# Patient Record
Sex: MALE | Race: BLACK OR AFRICAN AMERICAN | Employment: OTHER | URBAN - METROPOLITAN AREA
[De-identification: names, ages, dates, MRNs, and addresses within clinical notes are randomized per-mention and may not be internally consistent; named-entity substitution may affect disease eponyms.]

---

## 2017-05-30 ENCOUNTER — ANESTHESIA EVENT (OUTPATIENT)
Dept: ENDOSCOPY | Age: 62
End: 2017-05-30
Payer: OTHER GOVERNMENT

## 2017-05-30 RX ORDER — PIMECROLIMUS 10 MG/G
CREAM TOPICAL
COMMUNITY
End: 2018-08-01 | Stop reason: ALTCHOICE

## 2017-05-30 RX ORDER — FENOFIBRATE 145 MG/1
145 TABLET, COATED ORAL DAILY
COMMUNITY
End: 2018-08-01 | Stop reason: ALTCHOICE

## 2017-05-30 NOTE — ANESTHESIA PREPROCEDURE EVALUATION
Anesthetic History   No history of anesthetic complications            Review of Systems / Medical History  Patient summary reviewed, nursing notes reviewed and pertinent labs reviewed    Pulmonary        Sleep apnea: CPAP           Neuro/Psych   Within defined limits           Cardiovascular    Hypertension          Hyperlipidemia    Exercise tolerance: >4 METS  Comments:  EKG: SB   GI/Hepatic/Renal               Comments: Colon polyps Endo/Other    Diabetes: type 2    Obesity     Other Findings   Comments: Gout  Prostatitis         Physical Exam    Airway  Mallampati: II  TM Distance: > 6 cm  Neck ROM: normal range of motion   Mouth opening: Normal     Cardiovascular    Rhythm: regular  Rate: normal         Dental  No notable dental hx       Pulmonary  Breath sounds clear to auscultation               Abdominal  GI exam deferred       Other Findings            Anesthetic Plan    ASA: 3  Anesthesia type: total IV anesthesia          Induction: Intravenous  Anesthetic plan and risks discussed with: Patient      Did NOT take BP meds this am, not due until 9 am

## 2017-05-31 ENCOUNTER — HOSPITAL ENCOUNTER (OUTPATIENT)
Age: 62
Setting detail: OUTPATIENT SURGERY
Discharge: HOME | End: 2017-05-31
Attending: SPECIALIST | Admitting: SPECIALIST
Payer: OTHER GOVERNMENT

## 2017-05-31 ENCOUNTER — ANESTHESIA (OUTPATIENT)
Dept: ENDOSCOPY | Age: 62
End: 2017-05-31
Payer: OTHER GOVERNMENT

## 2017-05-31 VITALS
TEMPERATURE: 97.8 F | RESPIRATION RATE: 13 BRPM | HEIGHT: 73 IN | DIASTOLIC BLOOD PRESSURE: 87 MMHG | BODY MASS INDEX: 36.72 KG/M2 | SYSTOLIC BLOOD PRESSURE: 148 MMHG | OXYGEN SATURATION: 99 % | WEIGHT: 277.06 LBS | HEART RATE: 68 BPM

## 2017-05-31 PROCEDURE — 76060000031 HC ANESTHESIA FIRST 0.5 HR: Performed by: SPECIALIST

## 2017-05-31 PROCEDURE — 74011250636 HC RX REV CODE- 250/636: Performed by: SPECIALIST

## 2017-05-31 PROCEDURE — 74011000250 HC RX REV CODE- 250

## 2017-05-31 PROCEDURE — 88305 TISSUE EXAM BY PATHOLOGIST: CPT | Performed by: SPECIALIST

## 2017-05-31 PROCEDURE — 76040000019: Performed by: SPECIALIST

## 2017-05-31 PROCEDURE — 77030013992 HC SNR POLYP ENDOSC BSC -B: Performed by: SPECIALIST

## 2017-05-31 PROCEDURE — 74011250636 HC RX REV CODE- 250/636

## 2017-05-31 RX ORDER — MIDAZOLAM HYDROCHLORIDE 1 MG/ML
1-2 INJECTION, SOLUTION INTRAMUSCULAR; INTRAVENOUS
Status: DISCONTINUED | OUTPATIENT
Start: 2017-05-31 | End: 2017-05-31 | Stop reason: HOSPADM

## 2017-05-31 RX ORDER — SODIUM CHLORIDE 0.9 % (FLUSH) 0.9 %
5-10 SYRINGE (ML) INJECTION AS NEEDED
Status: DISCONTINUED | OUTPATIENT
Start: 2017-05-31 | End: 2017-05-31 | Stop reason: HOSPADM

## 2017-05-31 RX ORDER — LIDOCAINE HYDROCHLORIDE 20 MG/ML
INJECTION, SOLUTION EPIDURAL; INFILTRATION; INTRACAUDAL; PERINEURAL AS NEEDED
Status: DISCONTINUED | OUTPATIENT
Start: 2017-05-31 | End: 2017-05-31 | Stop reason: HOSPADM

## 2017-05-31 RX ORDER — SODIUM CHLORIDE 9 MG/ML
50 INJECTION, SOLUTION INTRAVENOUS CONTINUOUS
Status: DISCONTINUED | OUTPATIENT
Start: 2017-05-31 | End: 2017-05-31 | Stop reason: HOSPADM

## 2017-05-31 RX ORDER — SODIUM CHLORIDE 0.9 % (FLUSH) 0.9 %
5-10 SYRINGE (ML) INJECTION EVERY 8 HOURS
Status: DISCONTINUED | OUTPATIENT
Start: 2017-05-31 | End: 2017-05-31 | Stop reason: HOSPADM

## 2017-05-31 RX ORDER — DEXTROMETHORPHAN/PSEUDOEPHED 2.5-7.5/.8
1.2 DROPS ORAL
Status: DISCONTINUED | OUTPATIENT
Start: 2017-05-31 | End: 2017-05-31 | Stop reason: HOSPADM

## 2017-05-31 RX ORDER — PROPOFOL 10 MG/ML
INJECTION, EMULSION INTRAVENOUS AS NEEDED
Status: DISCONTINUED | OUTPATIENT
Start: 2017-05-31 | End: 2017-05-31 | Stop reason: HOSPADM

## 2017-05-31 RX ADMIN — SODIUM CHLORIDE: 900 INJECTION, SOLUTION INTRAVENOUS at 07:05

## 2017-05-31 RX ADMIN — PROPOFOL 100 MG: 10 INJECTION, EMULSION INTRAVENOUS at 07:38

## 2017-05-31 RX ADMIN — SODIUM CHLORIDE 50 ML/HR: 900 INJECTION, SOLUTION INTRAVENOUS at 07:27

## 2017-05-31 RX ADMIN — PROPOFOL 100 MG: 10 INJECTION, EMULSION INTRAVENOUS at 07:51

## 2017-05-31 RX ADMIN — LIDOCAINE HYDROCHLORIDE 80 MG: 20 INJECTION, SOLUTION EPIDURAL; INFILTRATION; INTRACAUDAL; PERINEURAL at 07:38

## 2017-05-31 RX ADMIN — PROPOFOL 100 MG: 10 INJECTION, EMULSION INTRAVENOUS at 07:56

## 2017-05-31 RX ADMIN — PROPOFOL 100 MG: 10 INJECTION, EMULSION INTRAVENOUS at 07:45

## 2017-05-31 RX ADMIN — PROPOFOL 100 MG: 10 INJECTION, EMULSION INTRAVENOUS at 07:35

## 2017-05-31 NOTE — DISCHARGE INSTRUCTIONS
Russ Burkitt .  [id number]  1955    COLON DISCHARGE INSTRUCTIONS  Discomfort:  Redness at IV site- apply warm compress to area; if redness or soreness persist- contact your physician  There may be a slight amount of blood passed from the rectum  Gaseous discomfort- walking, belching will help relieve any discomfort  You may not operate a vehicle for 12 hours  You may not engage in an occupation involving machinery or appliances for rest of today  You may not drink alcoholic beverages for at least 12 hours  Avoid making any critical decisions for at least 24 hour  DIET:   Regular diet. - however -  remember your colon is empty and a heavy meal will produce gas. Avoid these foods:  vegetables, fried / greasy foods, carbonated drinks for today. MEDICATIONS:        Regarding Aspirin or Nonsteroidal medications, please see below. ACTIVITY:  You may resume your normal daily activities it is recommended that you spend the remainder of the day resting -  avoid any strenuous activity. CALL M.D. ANY SIGN OF:  Increasing pain, nausea, vomiting  Abdominal distension (swelling)  New increased bleeding (oral or rectal)  Fever (chills)  Tylenol as needed for pain. Follow-up Instructions:   Call Dr. Troy Joel for results of procedure / biopsy in 4-5 days at telephone #  [phone]              Repeat Colonoscopy in 3 years. [employer] Activation    Thank you for requesting access to [employer]. Please follow the instructions below to securely access and download your online medical record. [employer] allows you to send messages to your doctor, view your test results, renew your prescriptions, schedule appointments, and more. How Do I Sign Up? 1. In your internet browser, go to [email or web address]  2. Click on the First Time User? Click Here link in the Sign In box. You will be redirect to the New Member Sign Up page. 3. Enter your [employer] Access Code exactly as it appears below.  You will not need to use this code after youve completed the sign-up process. If you do not sign up before the expiration date, you must request a new code. [employer] Access Code: Activation code not generated  Current Enish Status: Active (This is the date your [employer]ish access code will )    4. Enter the last four digits of your Social Security Number (xxxx) and Date of Birth (mm/dd/yyyy) as indicated and click Submit. You will be taken to the next sign-up page. 5. Create a [employer] ID. This will be your [employer]ish login ID and cannot be changed, so think of one that is secure and easy to remember. 6. Create a Enish password. You can change your password at any time. 7. Enter your Password Reset Question and Answer. This can be used at a later time if you forget your password. 8. Enter your e-mail address. You will receive e-mail notification when new information is available in [address] 9. Click Sign Up. You can now view and download portions of your medical record. 10. Click the Download Summary menu link to download a portable copy of your medical information. Additional Information    If you have questions, please visit the Frequently Asked Questions section of the [employer] website at [email or web address] Remember, [employer] is NOT to be used for urgent needs. For medical emergencies, dial 911.

## 2017-05-31 NOTE — PROCEDURES
Colonoscopy Procedure Note    Indications:   Personal history of colon polyps (screening only)    Referring Physician: iMnoo Upton MD  Anesthesia/Sedation: MAC anesthesia Propofol  Endoscopist:  Dr. Anastacia Willis    Procedure in Detail:  Informed consent was obtained for the procedure, including sedation. Risks of perforation, hemorrhage, adverse drug reaction, and aspiration were discussed. The patient was placed in the left lateral decubitus position. Based on the pre-procedure assessment, including review of the patient's medical history, medications, allergies, and review of systems, he had been deemed to be an appropriate candidate for moderate sedation; he was therefore sedated with the medications listed above. The patient was monitored continuously with ECG tracing, pulse oximetry, blood pressure monitoring, and direct observations. A rectal examination was performed. The [id number] was inserted into the rectum and advanced under direct vision to the cecum, which was identified by the ileocecal valve and appendiceal orifice. The quality of the colonic preparation was adequate. A careful inspection was made as the colonoscope was withdrawn, including a retroflexed view of the rectum; findings and interventions are described below. Appropriate photodocumentation was obtained. Findings:     1. Scope advanced to the cecum. 2.  There were (3) sessile polyps in the transverse colon that were 5 mm in size s/p cold snare removal.  3.  Diffuse moderate severity diverticulosis in the sigmoid, descending and transverse colon. 4.  Mucosa normal throughout. Therapies:  As above    Specimen: Specimens were collected as described above and sent to pathology. Complications: None were encountered during the procedure. EBL: < 10 ml. Recommendations:     - Await pathology. - Repeat colonoscopy in 3 years.       Signed By: Yareli Garvey MD                        May 31, 2017

## 2017-05-31 NOTE — H&P
Gastroenterology Outpatient History and Physical    Patient: Mohamud Zuñiga .    Physician: Nicolas Beaulieu MD    Vital Signs: Blood pressure (!) 148/92, pulse 73, temperature 97.8 °F (36.6 °C), resp. rate 18, height 6' 1\" (1.854 m), weight 125.7 kg (277 lb 1 oz), SpO2 100 %. Allergies: No Known Allergies    Chief Complaint: H/O Polyps    History of Present Illness: 58 yo BM for h/o colon polyps. Justification for Procedure: above    History:  Past Medical History:   Diagnosis Date    Hypercholesterolemia     Hypertension     Ill-defined condition     colon polyp    Prostatism       Past Surgical History:   Procedure Laterality Date    HX COLONOSCOPY      HX OTHER SURGICAL      ingrown toenail repair      Social History     Social History    Marital status:      Spouse name: N/A    Number of children: N/A    Years of education: N/A     Social History Main Topics    Smoking status: Never Smoker    Smokeless tobacco: Never Used    Alcohol use No    Drug use: No    Sexual activity: Yes     Partners: Female     Other Topics Concern    None     Social History Narrative      Family History   Problem Relation Age of Onset    Cancer Mother     Hypertension Mother     Cancer Father        Medications:   Prior to Admission medications    Medication Sig Start Date End Date Taking? Authorizing Provider   fenofibrate nanocrystallized (TRICOR) 145 mg tablet Take 145 mg by mouth daily. Yes Historical Provider   inulin (FIBER CHOICE, INULIN,) 1.5 gram chew Take 2 Tabs by mouth daily. Yes Historical Provider   pimecrolimus (ELIDEL) 1 % topical cream Apply  to affected area two (2) times daily as needed.    Yes Historical Provider   atenolol (TENORMIN) 50 mg tablet TAKE 1 TABLET DAILY 3/28/17  Yes Lenora Vogt MD   amLODIPine (NORVASC) 5 mg tablet TAKE 1 TABLET DAILY 3/28/17  Yes Lenora Vogt MD   tamsulosin (FLOMAX) 0.4 mg capsule TAKE 1 CAPSULE DAILY 7/19/16  Yes Lenora Vogt MD cholecalciferol, vitamin D3, (VITAMIN D3) 2,000 unit tab Take  by mouth. Yes Harvinder Blanchard MD   ascorbic acid (VITAMIN C) 500 mg tablet Take 1,000 mg by mouth daily. Yes Harvinder Blanchard MD   fluocinoLONE (SYNALAR) 0.025 % topical cream Apply  to affected area two (2) times a day. 4/26/15  Yes Kwame Hernandez MD   finasteride (PROSCAR) 5 mg tablet TAKE 1 TABLET BY MOUTH EVERY DAY 2/27/15  Yes Kwame Hernandez MD       Physical Exam:   General: alert, no distress   HEENT: Head: Normocephalic, no lesions, without obvious abnormality.    Heart: regular rate and rhythm, S1, S2 normal, no murmur, click, rub or gallop   Lungs: chest clear, no wheezing, rales, normal symmetric air entry   Abdominal: soft, NT/ND + BS   Neurological: Grossly normal   Extremities: extremities normal, atraumatic, no cyanosis or edema     Findings/Diagnosis: h/o polyps    Plan of Care/Planned Procedure: Colonoscopy with MAC    Signed By: Shae Phan MD     May 31, 2017

## 2017-05-31 NOTE — ANESTHESIA POSTPROCEDURE EVALUATION
Post-Anesthesia Evaluation and Assessment    Patient: Nichole Grover Sr. MRN: [id number]  SSN: [id number]    YOB: 1955  Age: 59 y.o. Sex: male       Cardiovascular Function/Vital Signs  Visit Vitals    /87    Pulse 68    Temp 36.6 °C (97.8 °F)    Resp 13    Ht 6' 1\" (1.854 m)    Wt 125.7 kg (277 lb 1 oz)    SpO2 99%    BMI 36.55 kg/m2       Patient is status post total IV anesthesia, MAC anesthesia for Procedure(s):  COLONOSCOPY  ENDOSCOPIC POLYPECTOMY. Nausea/Vomiting: None    Postoperative hydration reviewed and adequate. Pain:  Pain Scale 1: Numeric (0 - 10) (05/31/17 0834)  Pain Intensity 1: 0 (05/31/17 0089)   Managed    Neurological Status: At baseline    Mental Status and Level of Consciousness: Arousable    Pulmonary Status:   O2 Device: Room air (05/31/17 1334)   Adequate oxygenation and airway patent    Complications related to anesthesia: None    Post-anesthesia assessment completed.  No concerns    Signed By: Mark Sebastian DO     May 31, 2017

## 2017-05-31 NOTE — IP AVS SNAPSHOT
[address] [facility]. 
[phone] Patient: Mariel Kulkarni Sr. MRN: [id number] BRIAN:[birth year] You are allergic to the following No active allergies Recent Documentation Height Weight BMI Smoking Status 1.854 m 125.7 kg 36.55 kg/m2 Never Smoker Emergency Contacts Name Discharge Info Relation Home Work Mobile Marilou Enrique  Spouse [3] [phone] About your hospitalization You were admitted on:  May 31, 2017 You last received care in the:  [facility] ENDOSCOPY You were discharged on:  May 31, 2017 Unit phone number:  [phone] Why you were hospitalized Your primary diagnosis was:  Not on File Providers Seen During Your Hospitalizations Provider Role Specialty Primary office phone Akash Rockwell MD Attending Provider Gastroenterology [phone] Your Primary Care Physician (PCP) Primary Care Physician Office Phone Office Fax Chana Francis [phone] Follow-up Information None Current Discharge Medication List  
  
CONTINUE these medications which have NOT CHANGED Dose & Instructions Dispensing Information Comments Morning Noon Evening Bedtime  
 amLODIPine 5 mg tablet Commonly known as:  Reggie Frida Your last dose was: Your next dose is: TAKE 1 TABLET DAILY Quantity:  90 Tab Refills:  3  
     
   
   
   
  
 atenolol 50 mg tablet Commonly known as:  TENORMIN Your last dose was: Your next dose is: TAKE 1 TABLET DAILY Quantity:  90 Tab Refills:  3 ELIDEL 1 % topical cream  
Generic drug:  pimecrolimus Your last dose was: Your next dose is:    
   
   
 Apply  to affected area two (2) times daily as needed. Refills:  0 fenofibrate nanocrystallized 145 mg tablet Commonly known as:  [employer] Your last dose was: Your next dose is:    
   
   
 Dose:  145 mg Take 145 mg by mouth daily. Refills:  0 FIBER CHOICE (INULIN) 1.5 gram Carlton Ellie Generic drug:  inulin Your last dose was: Your next dose is:    
   
   
 Dose:  2 Tab Take 2 Tabs by mouth daily. Refills:  0  
     
   
   
   
  
 finasteride 5 mg tablet Commonly known as:  PROSCAR Your last dose was: Your next dose is: TAKE 1 TABLET BY MOUTH EVERY DAY Quantity:  90 Tab Refills:  4  
     
   
   
   
  
 fluocinoLONE 0.025 % topical cream  
Commonly known as:  SYNALAR Your last dose was: Your next dose is:    
   
   
 Apply  to affected area two (2) times a day. Quantity:  180 g Refills:  3  
     
   
   
   
  
 tamsulosin 0.4 mg capsule Commonly known as:  FLOMAX Your last dose was: Your next dose is: TAKE 1 CAPSULE DAILY Quantity:  90 Cap Refills:  3 VITAMIN C 500 mg tablet Generic drug:  ascorbic acid (vitamin C) Your last dose was: Your next dose is:    
   
   
 Dose:  1000 mg Take 1,000 mg by mouth daily. Refills:  0  
     
   
   
   
  
 VITAMIN D3 2,000 unit Tab Generic drug:  cholecalciferol (vitamin D3) Your last dose was: Your next dose is: Take  by mouth. Refills:  0 Discharge Instructions Sydnee Sneed Sr. 
[id number] 
1955 COLON DISCHARGE INSTRUCTIONS Discomfort: 
Redness at IV site- apply warm compress to area; if redness or soreness persist- contact your physician There may be a slight amount of blood passed from the rectum Gaseous discomfort- walking, belching will help relieve any discomfort You may not operate a vehicle for 12 hours You may not engage in an occupation involving machinery or appliances for rest of today You may not drink alcoholic beverages for at least 12 hours Avoid making any critical decisions for at least 24 hour DIET: 
 Regular diet.  however -  remember your colon is empty and a heavy meal will produce gas. Avoid these foods:  vegetables, fried / greasy foods, carbonated drinks for today. MEDICATIONS: 
  
 
 Regarding Aspirin or Nonsteroidal medications, please see below. ACTIVITY: 
You may resume your normal daily activities it is recommended that you spend the remainder of the day resting -  avoid any strenuous activity. CALL M.D. ANY SIGN OF: Increasing pain, nausea, vomiting Abdominal distension (swelling) New increased bleeding (oral or rectal) Fever (chills) Tylenol as needed for pain. Follow-up Instructions: 
 Call Dr. Sona Abbasi for results of procedure / biopsy in 4-5 days at telephone #  [phone] Repeat Colonoscopy in 3 years. [employer] Activation Thank you for requesting access to [employer]. Please follow the instructions below to securely access and download your online medical record. [employer] allows you to send messages to your doctor, view your test results, renew your prescriptions, schedule appointments, and more. How Do I Sign Up? 1. In your internet browser, go to [email or web address] 
2. Click on the First Time User? Click Here link in the Sign In box. You will be redirect to the New Member Sign Up page. 3. Enter your [employer] Access Code exactly as it appears below. You will not need to use this code after youve completed the sign-up process. If you do not sign up before the expiration date, you must request a new code. [employer] Access Code: Activation code not generated Current [employer] Status: Active (This is the date your [employer] access code will ) 4.  Enter the last four digits of your Social Security Number (xxxx) and Date of Birth (mm/dd/yyyy) as indicated and click Submit. You will be taken to the next sign-up page. 5. Create a [employer] ID. This will be your [employer] login ID and cannot be changed, so think of one that is secure and easy to remember. 6. Create a [employer] password. You can change your password at any time. 7. Enter your Password Reset Question and Answer. This can be used at a later time if you forget your password. 8. Enter your e-mail address. You will receive e-mail notification when new information is available in [address] 9. Click Sign Up. You can now view and download portions of your medical record. 10. Click the Download Summary menu link to download a portable copy of your medical information. Additional Information If you have questions, please visit the Frequently Asked Questions section of the [employer] website at [email or web address] Remember, [employer] is NOT to be used for urgent needs. For medical emergencies, dial 911. Discharge Orders None [facility]! Dear Albin Mckeon: Thank you for requesting a [employer] account. Our records indicate that you already have an active [employer] account. You can access your account anytime at [email or web address] Did you know that you can access your hospital and ER discharge instructions at any time in [employer]? You can also review all of your test results from your hospital stay or ER visit. Additional Information If you have questions, please visit the Frequently Asked Questions section of the [employer] website at [email or web address] Remember, [employer] is NOT to be used for urgent needs. For medical emergencies, dial 911. Now available from your iPhone and Android! General Information Please provide this summary of care documentation to your next provider.  
  
  
    
    
 Patient Signature: ____________________________________________________________ Date:  ____________________________________________________________  
  
Mimi Iba Provider Signature:  ____________________________________________________________ Date:  ____________________________________________________________

## 2017-05-31 NOTE — PERIOP NOTES
Anesthesia reports 500mg Propofol, 80mg Lidocaine and 350mL NS given during procedure. Received report from anesthesia staff on vital signs and status of patient.

## 2017-07-14 RX ORDER — TAMSULOSIN HYDROCHLORIDE 0.4 MG/1
CAPSULE ORAL
Qty: 90 CAP | Refills: 2 | Status: SHIPPED | OUTPATIENT
Start: 2017-07-14 | End: 2018-08-01 | Stop reason: SDUPTHER

## 2018-03-11 ENCOUNTER — HOSPITAL ENCOUNTER (EMERGENCY)
Age: 63
Discharge: HOME | End: 2018-03-11
Attending: EMERGENCY MEDICINE
Payer: OTHER GOVERNMENT

## 2018-03-11 ENCOUNTER — APPOINTMENT (OUTPATIENT)
Dept: GENERAL RADIOLOGY | Age: 63
End: 2018-03-11
Payer: OTHER GOVERNMENT

## 2018-03-11 VITALS
TEMPERATURE: 98.1 F | HEART RATE: 59 BPM | RESPIRATION RATE: 16 BRPM | BODY MASS INDEX: 36.82 KG/M2 | WEIGHT: 277.78 LBS | HEIGHT: 73 IN | OXYGEN SATURATION: 99 % | DIASTOLIC BLOOD PRESSURE: 91 MMHG | SYSTOLIC BLOOD PRESSURE: 165 MMHG

## 2018-03-11 DIAGNOSIS — S39.012A STRAIN OF LUMBAR REGION, INITIAL ENCOUNTER: Primary | ICD-10-CM

## 2018-03-11 DIAGNOSIS — M54.50 ACUTE BILATERAL LOW BACK PAIN WITHOUT SCIATICA: ICD-10-CM

## 2018-03-11 PROCEDURE — 72100 X-RAY EXAM L-S SPINE 2/3 VWS: CPT

## 2018-03-11 PROCEDURE — 99282 EMERGENCY DEPT VISIT SF MDM: CPT

## 2018-03-11 RX ORDER — NAPROXEN 375 MG/1
375 TABLET ORAL 2 TIMES DAILY WITH MEALS
Qty: 20 TAB | Refills: 0 | Status: SHIPPED | OUTPATIENT
Start: 2018-03-11 | End: 2018-08-01 | Stop reason: ALTCHOICE

## 2018-03-11 RX ORDER — METHOCARBAMOL 500 MG/1
500 TABLET, FILM COATED ORAL 4 TIMES DAILY
Qty: 12 TAB | Refills: 0 | Status: SHIPPED | OUTPATIENT
Start: 2018-03-11 | End: 2018-08-01 | Stop reason: ALTCHOICE

## 2018-03-11 RX ORDER — COLCHICINE 0.6 MG/1
0.6 TABLET ORAL DAILY
COMMUNITY
End: 2018-08-01 | Stop reason: SDUPTHER

## 2018-03-11 NOTE — ED PROVIDER NOTES
EMERGENCY DEPARTMENT HISTORY AND PHYSICAL EXAM      Date: 3/11/2018  Patient Name: Julia Howell.    History of Presenting Illness     Chief Complaint   Patient presents with    Back Pain     pt reports to ed complaining of lower left back and hip pain onset one week ago- pt reports lifting a heavy pot of soup    Hip Pain       History Provided By: Patient    HPI: Julia Humphrey, 57 y.o. male with PMHx significant for hypercholesterolemia, HTN, prostatism, colon polyp, BPH and gout, presents ambulatory to the ED with cc of constant lower back pain which started 4 days ago after lifting a heavy pot of soup. Pt notes his pain was gradual in onset. He describes the pain as a soreness. Pt states his pain radiates into his right hip more than his left. He has had similar pain before. Pt states his pain is worse with bending, lying flat, and standing up. His pain improves with stretching and movement throughout the day. He has tried Tylenol with no relief. Pt denies fall or injury. He is otherwise without complaint. PCP: Tom Corrales MD    There are no other complaints, changes, or physical findings at this time. Current Outpatient Prescriptions   Medication Sig Dispense Refill    methocarbamol (ROBAXIN) 500 mg tablet Take 1 Tab by mouth four (4) times daily. 12 Tab 0    naproxen (NAPROSYN) 375 mg tablet Take 1 Tab by mouth two (2) times daily (with meals). 20 Tab 0    tamsulosin (FLOMAX) 0.4 mg capsule TAKE 1 CAPSULE DAILY 90 Cap 2    fenofibrate nanocrystallized (TRICOR) 145 mg tablet Take 145 mg by mouth daily.  inulin (FIBER CHOICE, INULIN,) 1.5 gram chew Take 2 Tabs by mouth daily.  atenolol (TENORMIN) 50 mg tablet TAKE 1 TABLET DAILY 90 Tab 3    amLODIPine (NORVASC) 5 mg tablet TAKE 1 TABLET DAILY 90 Tab 3    cholecalciferol, vitamin D3, (VITAMIN D3) 2,000 unit tab Take  by mouth.  ascorbic acid (VITAMIN C) 500 mg tablet Take 1,000 mg by mouth daily.       finasteride (PROSCAR) 5 mg tablet TAKE 1 TABLET BY MOUTH EVERY DAY 90 Tab 4    colchicine 0.6 mg tablet Take 0.6 mg by mouth daily.  pimecrolimus (ELIDEL) 1 % topical cream Apply  to affected area two (2) times daily as needed.  fluocinoLONE (SYNALAR) 0.025 % topical cream Apply  to affected area two (2) times a day. 180 g 3       Past History     Past Medical History:  Past Medical History:   Diagnosis Date    BPH (benign prostatic hyperplasia)     Gout     Hypercholesterolemia     Hypertension     Ill-defined condition     colon polyp    Prostatism        Past Surgical History:  Past Surgical History:   Procedure Laterality Date    COLONOSCOPY N/A 5/31/2017    COLONOSCOPY [facility]is [facility]ado [facility] at [facility]    HX COLONOSCOPY      HX OTHER SURGICAL      ingrown toenail repair       Family History:  Family History   Problem Relation Age of Onset    Cancer Mother     Hypertension Mother     Cancer Father        Social History:  Social History   Substance Use Topics    Smoking status: Never Smoker    Smokeless tobacco: Never Used    Alcohol use No       Allergies:  No Known Allergies      Review of Systems   Review of Systems   Constitutional: Negative. Negative for appetite change, chills, fatigue and fever. HENT: Negative. Negative for congestion and sore throat. Eyes: Negative. Negative for visual disturbance. Respiratory: Negative. Negative for cough and shortness of breath. Cardiovascular: Negative. Negative for chest pain, palpitations and leg swelling. Gastrointestinal: Negative. Negative for abdominal pain, constipation, diarrhea, nausea and vomiting. Genitourinary: Negative. Negative for difficulty urinating, dysuria, flank pain and hematuria. Musculoskeletal: Positive for back pain (lower radiating into right hip). Negative for neck pain. Skin: Negative. Negative for rash. Neurological: Negative.   Negative for dizziness, syncope, weakness, numbness and headaches. - loss of bowel and bladder control   Hematological: Negative. Psychiatric/Behavioral: Negative. All other systems reviewed and are negative. Physical Exam   Physical Exam   Constitutional: He is oriented to person, place, and time. He appears well-developed and well-nourished. No distress. HENT:   Head: Normocephalic and atraumatic. Neck: Normal range of motion. Cardiovascular: Normal rate, normal heart sounds, intact distal pulses and normal pulses. Exam reveals no gallop and no friction rub. No murmur heard. Pulmonary/Chest: Effort normal and breath sounds normal. No respiratory distress. He has no wheezes. He has no rales. Abdominal: Soft. Bowel sounds are normal. He exhibits no distension. There is no tenderness. Musculoskeletal: Normal range of motion. He exhibits no edema or tenderness. No reproducible tenderness to bilateral lower back and buttocks. No sciatic notch tenderness. No midline cervical, thoracic, or lumbar spinal tenderness to palpation. FROM spine and all joints/extremities in ER without difficulty. Ambulatory in ER without difficulty. Neurological: He is alert and oriented to person, place, and time. He has normal strength. No sensory deficit. No focal neurologic deficit. Strength 5/5 and equal bilateral upper and lower extremities. NVI all extremities, brisk cap refill all extremities. DP pulse 2+ bilaterally. Radial pulse 2+ bilaterally. Skin: Skin is warm and dry. No rash noted. He is not diaphoretic. No erythema. No pallor. Psychiatric: He has a normal mood and affect. His behavior is normal.   Nursing note and vitals reviewed. Diagnostic Study Results     Radiologic Studies -   XR SPINE LUMB 2 OR 3 V   Final Result   CLINICAL HISTORY: Low back pain     INDICATION: Low back pain  FINDINGS:     Three views of the lumbar spine are obtained. Spondylolisthesis at L4-5. Loss of disc height at L5-S1.   Visualized osseous structures are without evidence of fracture-dislocation. There is no significant soft tissue abnormality identified.     IMPRESSION  IMPRESSION:     No fracture. Medical Decision Making   I am the first provider for this patient. I reviewed the vital signs, available nursing notes, past medical history, past surgical history, family history and social history. Vital Signs-Reviewed the patient's vital signs. Patient Vitals for the past 12 hrs:   Temp Pulse Resp BP SpO2   03/11/18 1458 98.1 °F (36.7 °C) (!) 59 16 (!) 165/91 99 %     Records Reviewed: Nursing Notes and Old Medical Records    Provider Notes (Medical Decision Making):   DDx: sciatica, strain, spasm, radiculopathy     ED Course:   Initial assessment performed. The patients presenting problems have been discussed, and they are in agreement with the care plan formulated and outlined with them. I have encouraged them to ask questions as they arise throughout their visit. 3:43 PM  Patient would not like first dose of the prescribed medications in the ED. Disposition:  DISCHARGE NOTE  3:48 PM  The patient has been re-evaluated and is ready for discharge. Reviewed available results with patient. Counseled patient on diagnosis and care plan. Patient has expressed understanding, and all questions have been answered. Patient agrees with plan and agrees to follow up as recommended, or return to the ED if their symptoms worsen. Discharge instructions have been provided and explained to the patient, along with reasons to return to the ED. PLAN:  1. Current Discharge Medication List      START taking these medications    Details   methocarbamol (ROBAXIN) 500 mg tablet Take 1 Tab by mouth four (4) times daily. Qty: 12 Tab, Refills: 0      naproxen (NAPROSYN) 375 mg tablet Take 1 Tab by mouth two (2) times daily (with meals).   Qty: 20 Tab, Refills: 0         CONTINUE these medications which have NOT CHANGED    Details   tamsulosin (FLOMAX) 0.4 mg capsule TAKE 1 CAPSULE DAILY  Qty: 90 Cap, Refills: 2      fenofibrate nanocrystallized (TRICOR) 145 mg tablet Take 145 mg by mouth daily. inulin (FIBER CHOICE, INULIN,) 1.5 gram chew Take 2 Tabs by mouth daily. atenolol (TENORMIN) 50 mg tablet TAKE 1 TABLET DAILY  Qty: 90 Tab, Refills: 3      amLODIPine (NORVASC) 5 mg tablet TAKE 1 TABLET DAILY  Qty: 90 Tab, Refills: 3      cholecalciferol, vitamin D3, (VITAMIN D3) 2,000 unit tab Take  by mouth. ascorbic acid (VITAMIN C) 500 mg tablet Take 1,000 mg by mouth daily. finasteride (PROSCAR) 5 mg tablet TAKE 1 TABLET BY MOUTH EVERY DAY  Qty: 90 Tab, Refills: 4      colchicine 0.6 mg tablet Take 0.6 mg by mouth daily. pimecrolimus (ELIDEL) 1 % topical cream Apply  to affected area two (2) times daily as needed. fluocinoLONE (SYNALAR) 0.025 % topical cream Apply  to affected area two (2) times a day. Qty: 180 g, Refills: 3    Associated Diagnoses: Dermatitis           2. Follow-up Information     Follow up With Details Comments [address] [place] [address] Joel Terry MD Schedule an appointment as soon as possible for a visit in 3 days  [address]  [phone]      [facility] EMERGENCY DEPT  If symptoms worsen [address]  [address] [facility]  [phone]        Return to ED if worse     Diagnosis     Clinical Impression:   1. Strain of lumbar region, initial encounter    2. Acute bilateral low back pain without sciatica        Attestations:    Attestation Note:  This note is prepared by [facility], acting as Scribe for Wilber Kuhn: The scribe's documentation has been prepared under my direction and personally reviewed by me in its entirety. I confirm that the note above accurately reflects all work, treatment, procedures, and medical decision making performed by me.

## 2018-03-11 NOTE — DISCHARGE INSTRUCTIONS
Back Care and Preventing Injuries: Care Instructions  Your Care Instructions    You can hurt your back doing many everyday activities: lifting a heavy box, bending down to garden, exercising at the gym, and even getting out of bed. But you can keep your back strong and healthy by doing some exercises. You also can follow a few tips for sitting, sleeping, and lifting to avoid hurting your back again. Talk to your doctor before you start an exercise program. Ask for help if you want to learn more about keeping your back healthy. Follow-up care is a key part of your treatment and safety. Be sure to make and go to all appointments, and call your doctor if you are having problems. It's also a good idea to know your test results and keep a list of the medicines you take. How can you care for yourself at home? · Stay at a healthy weight to avoid strain on your lower back. · Do not smoke. Smoking increases the risk of osteoporosis, which weakens the spine. If you need help quitting, talk to your doctor about stop-smoking programs and medicines. These can increase your chances of quitting for good. · Make sure you sleep in a position that maintains your back's normal curves and on a mattress that feels comfortable. Sleep on your side with a pillow between your knees, or sleep on your back with a pillow under your knees. These positions can reduce strain on your back. · When you get out of bed, lie on your side and bend both knees. Drop your feet over the edge of the bed as you push up with both arms. Scoot to the edge of the bed. Make sure your feet are in line with your rear end (buttocks), and then stand up. · If you must stand for a long time, put one foot on a stool, ledge, or box. Exercise to strengthen your back and other muscles  · Get at least 30 minutes of exercise on most days of the week. Walking is a good choice.  You also may want to do other activities, such as running, swimming, cycling, or playing tennis or team sports. · Stretch your back muscles. Here are few exercises to try:  Gailen Kidney on your back with your knees bent and your feet flat on the floor. Gently pull one bent knee to your chest. Put that foot back on the floor, and then pull the other knee to your chest. Hold for 15 to 30 seconds. Repeat 2 to 4 times. ¨ Do pelvic tilts. Lie on your back with your knees bent. Tighten your stomach muscles. Pull your belly button (navel) in and up toward your ribs. You should feel like your back is pressing to the floor and your hips and pelvis are slightly lifting off the floor. Hold for 6 seconds while breathing smoothly. · Keep your core muscles strong. The muscles of your back, belly (abdomen), and buttocks support your spine. ¨ Pull in your belly, and imagine pulling your navel toward your spine. Hold this for 6 seconds, then relax. Remember to keep breathing normally as you tense your muscles. ¨ Do curl-ups. Always do them with your knees bent. Keep your low back on the floor, and curl your shoulders toward your knees using a smooth, slow motion. Keep your arms folded across your chest. If this bothers your neck, try putting your hands behind your neck (not your head), with your elbows spread apart. ¨ Lie on your back with your knees bent and your feet flat on the floor. Tighten your belly muscles, and then push with your feet and raise your buttocks up a few inches. Hold this position 6 seconds as you continue to breathe normally, then lower yourself slowly to the floor. Repeat 8 to 12 times. ¨ If you like group exercise, try Pilates or yoga. These classes have poses that strengthen the core muscles. Protect your back when you sit  · Place a small pillow, a rolled-up towel, or a lumbar roll in the curve of your back if you need extra support. · Sit in a chair that is low enough to let you place both feet flat on the floor with both knees nearly level with your hips.  If your chair or desk is too high, use a foot rest to raise your knees. · When driving, keep your knees nearly level with your hips. Sit straight, and drive with both hands on the steering wheel. Your arms should be in a slightly bent position. · Try a kneeling chair, which helps tilt your hips forward. This takes pressure off your lower back. · Try sitting on an exercise ball. It can rock from side to side, which helps keep your back loose. Lift properly  · Squat down, bending at the hips and knees only. If you need to, put one knee to the floor and extend your other knee in front of you, bent at a right angle (half kneeling). · Press your chest straight forward. This helps keep your upper back straight while keeping a slight arch in your low back. · Hold the load as close to your body as possible, at the level of your navel. · Use your feet to change direction, taking small steps. · Lead with your hips as you change direction. Keep your shoulders in line with your hips as you move. Do not twist your body. · Set down your load carefully, squatting with your knees and hips only. When should you call for help? Watch closely for changes in your health, and be sure to contact your doctor if you have any problems. Where can you learn more? Go to http://stephan-genna.info/. Enter S810 in the search box to learn more about \"Back Care and Preventing Injuries: Care Instructions. \"  Current as of: March 21, 2017  Content Version: 11.4  © 1871-7147 [employer]. Care instructions adapted under license by [employer] (which disclaims liability or warranty for this information). If you have questions about a medical condition or this instruction, always ask your healthcare professional. [place] [address] any warranty or liability for your use of this information.          Low Back Pain: Exercises  Your Care Instructions  Here are some examples of typical rehabilitation exercises for your condition. Start each exercise slowly. Ease off the exercise if you start to have pain. Your doctor or physical therapist will tell you when you can start these exercises and which ones will work best for you. How to do the exercises  Press-up    1. Lie on your stomach, supporting your body with your forearms. 2. Press your elbows down into the floor to raise your upper back. As you do this, relax your stomach muscles and allow your back to arch without using your back muscles. As your press up, do not let your hips or pelvis come off the floor. 3. Hold for 15 to 30 seconds, then relax. 4. Repeat 2 to 4 times. Alternate arm and leg (bird dog) exercise    Do this exercise slowly. Try to keep your body straight at all times, and do not let one hip drop lower than the other. 1. Start on the floor, on your hands and knees. 2. Tighten your belly muscles. 3. Raise one leg off the floor, and hold it straight out behind you. Be careful not to let your hip drop down, because that will twist your trunk. 4. Hold for about 6 seconds, then lower your leg and switch to the other leg. 5. Repeat 8 to 12 times on each leg. 6. Over time, work up to holding for 10 to 30 seconds each time. 7. If you feel stable and secure with your leg raised, try raising the opposite arm straight out in front of you at the same time. Knee-to-chest exercise    1. Lie on your back with your knees bent and your feet flat on the floor. 2. Bring one knee to your chest, keeping the other foot flat on the floor (or keeping the other leg straight, whichever feels better on your lower back). 3. Keep your lower back pressed to the floor. Hold for at least 15 to 30 seconds. 4. Relax, and lower the knee to the starting position. 5. Repeat with the other leg. Repeat 2 to 4 times with each leg. 6. To get more stretch, put your other leg flat on the floor while pulling your knee to your chest.  Curl-ups    1.  Lie on the floor on your back with your knees bent at a 90-degree angle. Your feet should be flat on the floor, about 12 inches from your buttocks. 2. Cross your arms over your chest. If this bothers your neck, try putting your hands behind your neck (not your head), with your elbows spread apart. 3. Slowly tighten your belly muscles and raise your shoulder blades off the floor. 4. Keep your head in line with your body, and do not press your chin to your chest.  5. Hold this position for 1 or 2 seconds, then slowly lower yourself back down to the floor. 6. Repeat 8 to 12 times. Pelvic tilt exercise    1. Lie on your back with your knees bent. 2. \"Brace\" your stomach. This means to tighten your muscles by pulling in and imagining your belly button moving toward your spine. You should feel like your back is pressing to the floor and your hips and pelvis are rocking back. 3. Hold for about 6 seconds while you breathe smoothly. 4. Repeat 8 to 12 times. Heel dig bridging    1. Lie on your back with both knees bent and your ankles bent so that only your heels are digging into the floor. Your knees should be bent about 90 degrees. 2. Then push your heels into the floor, squeeze your buttocks, and lift your hips off the floor until your shoulders, hips, and knees are all in a straight line. 3. Hold for about 6 seconds as you continue to breathe normally, and then slowly lower your hips back down to the floor and rest for up to 10 seconds. 4. Do 8 to 12 repetitions. Hamstring stretch in doorway    1. Lie on your back in a doorway, with one leg through the open door. 2. Slide your leg up the wall to straighten your knee. You should feel a gentle stretch down the back of your leg. 3. Hold the stretch for at least 15 to 30 seconds. Do not arch your back, point your toes, or bend either knee. Keep one heel touching the floor and the other heel touching the wall. 4. Repeat with your other leg. 5. Do 2 to 4 times for each leg.   Hip flexor stretch    1. Kneel on the floor with one knee bent and one leg behind you. Place your forward knee over your foot. Keep your other knee touching the floor. 2. Slowly push your hips forward until you feel a stretch in the upper thigh of your rear leg. 3. Hold the stretch for at least 15 to 30 seconds. Repeat with your other leg. 4. Do 2 to 4 times on each side. Wall sit    1. Stand with your back 10 to 12 inches away from a wall. 2. Lean into the wall until your back is flat against it. 3. Slowly slide down until your knees are slightly bent, pressing your lower back into the wall. 4. Hold for about 6 seconds, then slide back up the wall. 5. Repeat 8 to 12 times. Follow-up care is a key part of your treatment and safety. Be sure to make and go to all appointments, and call your doctor if you are having problems. It's also a good idea to know your test results and keep a list of the medicines you take. Where can you learn more? Go to http://stephan-genna.info/. Enter X180 in the search box to learn more about \"Low Back Pain: Exercises. \"  Current as of: March 21, 2017  Content Version: 11.4  © 3175-1346 [employer]. Care instructions adapted under license by [employer] (which disclaims liability or warranty for this information). If you have questions about a medical condition or this instruction, always ask your healthcare professional. [place] [address] any warranty or liability for your use of this information. Back Pain, Emergency or Urgent Symptoms: Care Instructions  Your Care Instructions    Many people have back pain at one time or another. In most cases, pain gets better with self-care that includes over-the-counter pain medicine, ice, heat, and exercises. Unless you have symptoms of a severe injury or heart attack, you may be able to give yourself a few days before you call a doctor. But some back problems are very serious.  Do not ignore symptoms that need to be checked right away. Follow-up care is a key part of your treatment and safety. Be sure to make and go to all appointments, and call your doctor if you are having problems. It's also a good idea to know your test results and keep a list of the medicines you take. How can you care for yourself at home? · Sit or lie in positions that are most comfortable and that reduce your pain. Try one of these positions when you lie down:  ¨ Lie on your back with your knees bent and supported by large pillows. ¨ Lie on the floor with your legs on the seat of a sofa or chair. Swapnil Harries on your side with your knees and hips bent and a pillow between your legs. ¨ Lie on your stomach if it does not make pain worse. · Do not sit up in bed, and avoid soft couches and twisted positions. Bed rest can help relieve pain at first, but it delays healing. Avoid bed rest after the first day. · Change positions every 30 minutes. If you must sit for long periods of time, take breaks from sitting. Get up and walk around, or lie flat. · Try using a heating pad on a low or medium setting, for 15 to 20 minutes every 2 or 3 hours. Try a warm shower in place of one session with the heating pad. You can also buy single-use heat wraps that last up to 8 hours. You can also try ice or cold packs on your back for 10 to 20 minutes at a time, several times a day. (Put a thin cloth between the ice pack and your skin.) This reduces pain and makes it easier to be active and exercise. · Take pain medicines exactly as directed. ¨ If the doctor gave you a prescription medicine for pain, take it as prescribed. ¨ If you are not taking a prescription pain medicine, ask your doctor if you can take an over-the-counter medicine. When should you call for help? Call 911 anytime you think you may need emergency care. For example, call if:  ? · You are unable to move a leg at all. ? · You have back pain with severe belly pain. ? · You have symptoms of a heart attack. These may include:  ¨ Chest pain or pressure, or a strange feeling in the chest.  ¨ Sweating. ¨ Shortness of breath. ¨ Nausea or vomiting. ¨ Pain, pressure, or a strange feeling in the back, neck, jaw, or upper belly or in one or both shoulders or arms. ¨ Lightheadedness or sudden weakness. ¨ A fast or irregular heartbeat. After you call 911, the  may tell you to chew 1 adult-strength or 2 to 4 low-dose aspirin. Wait for an ambulance. Do not try to drive yourself. ?Call your doctor now or seek immediate medical care if:  ? · You have new or worse symptoms in your arms, legs, chest, belly, or buttocks. Symptoms may include:  ¨ Numbness or tingling. ¨ Weakness. ¨ Pain. ? · You lose bladder or bowel control. ? · You have back pain and:  ¨ You have injured your back while lifting or doing some other activity. Call if the pain is severe, has not gone away after 1 or 2 days, and you cannot do your normal daily activities. ¨ You have had a back injury before that needed treatment. ¨ Your pain has lasted longer than 4 weeks. ¨ You have had weight loss you cannot explain. ¨ You are age 48 or older. ¨ You have cancer now or have had it before. ? Watch closely for changes in your health, and be sure to contact your doctor if you are not getting better as expected. Where can you learn more? Go to http://stephan-genna.info/. Enter Z131 in the search box to learn more about \"Back Pain, Emergency or Urgent Symptoms: Care Instructions. \"  Current as of: March 20, 2017  Content Version: 11.4  © 5815-7898 [employer]. Care instructions adapted under license by [employer] (which disclaims liability or warranty for this information).  If you have questions about a medical condition or this instruction, always ask your healthcare professional. [address] any warranty or liability for your use of this information.

## 2018-03-11 NOTE — ED NOTES
Scott PITTS has reviewed discharge instructions with the patient. The patient verbalized understanding. Pt discharged with written instructions. No further concerns at this time.

## 2018-03-11 NOTE — ED NOTES
Patient presents to ED for right hip that radiates down leg after lifting a heavy pot of soap four days ago. Patient reports bending this morning to put on socks he reports that it aggravated the pain. Patient reports laying flat makes the pain worse. Patient reports HX of back pain. Patient alert and oriented x 4. Pt denies injury or fall.

## 2018-04-11 RX ORDER — ATENOLOL 50 MG/1
TABLET ORAL
Qty: 90 TAB | Refills: 3 | Status: SHIPPED | OUTPATIENT
Start: 2018-04-11 | End: 2018-08-01 | Stop reason: SDUPTHER

## 2018-04-11 RX ORDER — AMLODIPINE BESYLATE 5 MG/1
TABLET ORAL
Qty: 90 TAB | Refills: 3 | Status: SHIPPED | OUTPATIENT
Start: 2018-04-11 | End: 2018-08-01 | Stop reason: SDUPTHER

## 2018-08-01 ENCOUNTER — OFFICE VISIT (OUTPATIENT)
Dept: INTERNAL MEDICINE CLINIC | Age: 63
End: 2018-08-01

## 2018-08-01 VITALS
OXYGEN SATURATION: 96 % | HEART RATE: 64 BPM | SYSTOLIC BLOOD PRESSURE: 134 MMHG | TEMPERATURE: 97.5 F | BODY MASS INDEX: 35.7 KG/M2 | HEIGHT: 73 IN | WEIGHT: 269.4 LBS | RESPIRATION RATE: 16 BRPM | DIASTOLIC BLOOD PRESSURE: 82 MMHG

## 2018-08-01 DIAGNOSIS — G47.33 OBSTRUCTIVE SLEEP APNEA: ICD-10-CM

## 2018-08-01 DIAGNOSIS — R79.9 ABNORMAL FINDING OF BLOOD CHEMISTRY: ICD-10-CM

## 2018-08-01 DIAGNOSIS — I10 ESSENTIAL HYPERTENSION: Primary | ICD-10-CM

## 2018-08-01 DIAGNOSIS — E66.01 SEVERE OBESITY (BMI 35.0-39.9): ICD-10-CM

## 2018-08-01 DIAGNOSIS — R35.1 NOCTURIA: ICD-10-CM

## 2018-08-01 DIAGNOSIS — M10.9 GOUT, UNSPECIFIED CAUSE, UNSPECIFIED CHRONICITY, UNSPECIFIED SITE: ICD-10-CM

## 2018-08-01 DIAGNOSIS — Z71.89 ACP (ADVANCE CARE PLANNING): ICD-10-CM

## 2018-08-01 RX ORDER — TAMSULOSIN HYDROCHLORIDE 0.4 MG/1
0.4 CAPSULE ORAL DAILY
Qty: 90 CAP | Refills: 3 | Status: SHIPPED | OUTPATIENT
Start: 2018-08-01 | End: 2018-11-07 | Stop reason: SDUPTHER

## 2018-08-01 RX ORDER — FINASTERIDE 5 MG/1
TABLET, FILM COATED ORAL
Qty: 90 TAB | Refills: 4 | Status: SHIPPED | OUTPATIENT
Start: 2018-08-01 | End: 2018-11-07 | Stop reason: SDUPTHER

## 2018-08-01 RX ORDER — ASCORBIC ACID 500 MG
1000 TABLET ORAL DAILY
Qty: 90 TAB | Refills: 11 | Status: SHIPPED | OUTPATIENT
Start: 2018-08-01 | End: 2021-01-13

## 2018-08-01 RX ORDER — EMOLLIENT BASE
CREAM (GRAM) TOPICAL AS NEEDED
Qty: 453 G | Refills: 11 | Status: SHIPPED | OUTPATIENT
Start: 2018-08-01 | End: 2021-01-13 | Stop reason: SDUPTHER

## 2018-08-01 RX ORDER — CHOLECALCIFEROL (VITAMIN D3) 125 MCG
1 CAPSULE ORAL DAILY
Qty: 90 TAB | Refills: 11 | Status: SHIPPED | OUTPATIENT
Start: 2018-08-01 | End: 2021-01-13

## 2018-08-01 RX ORDER — ATENOLOL 50 MG/1
50 TABLET ORAL DAILY
Qty: 90 TAB | Refills: 3 | Status: SHIPPED | OUTPATIENT
Start: 2018-08-01 | End: 2018-11-07 | Stop reason: SDUPTHER

## 2018-08-01 RX ORDER — AMLODIPINE BESYLATE 5 MG/1
TABLET ORAL
Qty: 90 TAB | Refills: 3 | Status: SHIPPED | OUTPATIENT
Start: 2018-08-01 | End: 2018-11-07 | Stop reason: SDUPTHER

## 2018-08-01 RX ORDER — COLCHICINE 0.6 MG/1
0.6 TABLET ORAL DAILY
Qty: 90 TAB | Refills: 3 | Status: SHIPPED | OUTPATIENT
Start: 2018-08-01 | End: 2020-08-26

## 2018-08-01 RX ORDER — FENOFIBRATE 145 MG/1
145 TABLET, COATED ORAL DAILY
Status: CANCELLED | OUTPATIENT
Start: 2018-08-01

## 2018-08-01 NOTE — PROGRESS NOTES
[facility] Timoteo Castillo MD, [address] [place] [address] Phone:  [phone]  Fax: [phone] Chief Complaint Patient presents with [address] [place] [address] SUBJECTIVE: 
 
Mariza Molina is a 70 y.o. male  Dictation on: 08/01/2018  4:34 PM by: Reina Pritchard [[id number]] Current Outpatient Prescriptions Medication Sig Dispense Refill  amLODIPine (NORVASC) 5 mg tablet TAKE 1 TABLET DAILY 90 Tab 3  
 atenolol (TENORMIN) 50 mg tablet Take 1 Tab by mouth daily. 90 Tab 3  
 colchicine 0.6 mg tablet Take 1 Tab by mouth daily. 90 Tab 3  
 tamsulosin (FLOMAX) 0.4 mg capsule Take 1 Cap by mouth daily. 90 Cap 3  
 inulin (FIBER CHOICE, INULIN,) 1.5 gram chew Take 2 Tabs by mouth daily. 180 Tab 11  
 cholecalciferol, vitamin D3, (VITAMIN D3) 2,000 unit tab Take 1 Tab by mouth daily. 90 Tab 11  
 ascorbic acid, vitamin C, (VITAMIN C) 500 mg tablet Take 2 Tabs by mouth daily. 90 Tab 11  
 finasteride (PROSCAR) 5 mg tablet TAKE 1 TABLET BY MOUTH EVERY DAY 90 Tab 4  
 emollient topical cream Apply  to affected area as needed. 453 g 11 Past Medical History:  
Diagnosis Date  BPH (benign prostatic hyperplasia)  Gout  Hypercholesterolemia  Hypertension  Prostatism  S/P colonoscopic polypectomy 05/31/2017  
 colon polyp Miguel Vitale MD  
 
Past Surgical History:  
Procedure Laterality Date  COLONOSCOPY N/A 5/31/2017 COLONOSCOPY performed by Miguel Vitale MD at [facility] ENDOSCOPY  
 HX COLONOSCOPY    
 HX OTHER SURGICAL    
 ingrown toenail repair No Known Allergies REVIEW OF SYSTEMS: 
General: negative for - chills or fever ENT: negative for - headaches, nasal congestion or tinnitus Respiratory: negative for - cough, hemoptysis, shortness of breath or wheezing Cardiovascular : negative for - chest pain, edema, palpitations or shortness of breath Gastrointestinal: negative for - abdominal pain, blood in stools, heartburn or nausea/vomiting Genito-Urinary: no dysuria, trouble voiding, or hematuria Musculoskeletal: negative for - gait disturbance, joint pain, joint stiffness or joint swelling Neurological: no TIA or stroke symptoms Hematologic: no bruises, no bleeding, no swollen glands Integument: no lumps, mole changes, nail changes or rash Endocrine:no malaise/lethargy or unexpected weight changes Social History Social History  Marital status:  Spouse name: N/A  
 Number of children: N/A  
 Years of education: N/A Social History Main Topics  Smoking status: Never Smoker  Smokeless tobacco: Never Used  Alcohol use No  
 Drug use: No  
 Sexual activity: Yes  
  Partners: Female Other Topics Concern  None Social History Narrative Family History Problem Relation Age of Onset  Cancer Mother  Hypertension Mother  Cancer Father OBJECTIVE:  
 
Visit Vitals  /82  Pulse 64  Temp 97.5 °F (36.4 °C) (Oral)  Resp 16  
 Ht 6' 1\" (1.854 m)  Wt 269 lb 6.4 oz (122.2 kg)  SpO2 96%  BMI 35.54 kg/m2 CONSTITUTIONAL: well , well nourished, appears age appropriate EYES: perrla, eom intact ENMT:moist mucous membranes, pharynx clear NECK: supple. Thyroid normal 
RESPIRATORY: Chest: clear bilaterally CARDIOVASCULAR: Heart: regular rate and rhythm GASTROINTESTINAL: Abdomen: soft, bowel sounds active HEMATOLOGIC: no pathological lymph nodes palpated MUSCULOSKELETAL: Extremities: no edema, pulse 1+ INTEGUMENT: No unusual rashes or suspicious skin lesions noted. Nails appear normal. 
NEUROLOGIC: non-focal exam  
MENTAL STATUS: alert and oriented, appropriate affect No visits with results within 3 Month(s) from this visit. Latest known visit with results is: 
 
Office Visit on 04/24/2015 Component Date Value Ref Range Status  Glucose 04/24/2015 101* 65 - 99 mg/dL Final  
 BUN 04/24/2015 8  8 - 27 mg/dL Final  
 Creatinine 04/24/2015 1.17  0.76 - 1.27 mg/dL Final  
 GFR est non-AA 04/24/2015 67  >59 mL/min/1.73 Final  
 GFR est AA 04/24/2015 77  >59 mL/min/1.73 Final  
 BUN/Creatinine ratio 04/24/2015 7* 10 - 22 Final  
 Sodium 04/24/2015 141  134 - 144 mmol/L Final  
 Potassium 04/24/2015 4.2  3.5 - 5.2 mmol/L Final  
 Chloride 04/24/2015 102  97 - 108 mmol/L Final  
 CO2 04/24/2015 24  18 - 29 mmol/L Final  
 Calcium 04/24/2015 9.2  8.6 - 10.2 mg/dL Final  
 Apolipoprotein B 04/24/2015 83* 0 - 79 mg/dL Final  
 Cholesterol, total 04/24/2015 152  100 - 199 mg/dL Final  
 Triglyceride 04/24/2015 332* 0 - 149 mg/dL Final  
 HDL Cholesterol 04/24/2015 35* >39 mg/dL Final  
 Comment: According to ATP-III Guidelines, HDL-C >59 mg/dL is considered a 
negative risk factor for CHD.  VLDL, calculated 04/24/2015 66* 5 - 40 mg/dL Final  
 LDL, calculated 04/24/2015 51  0 - 99 mg/dL Final  
 Hemoglobin A1c 04/24/2015 6.6* 4.8 - 5.6 % Final  
 Comment:          Increased risk for diabetes: 5.7 - 6.4 Diabetes: >6.4 Glycemic control for adults with diabetes: <7.0 ASSESSMENT:  
1. Essential hypertension 2. Severe obesity (BMI 35.0-39.9) ([facility] [address] 3. Obstructive sleep apnea 4. Gout, unspecified cause, unspecified chronicity, unspecified site 5. Abnormal finding of blood chemistry 6. Nocturia 7. ACP (advance care planning) Dictation on: 08/01/2018  4:50 PM by: Jacob Cowan [[id number]] I have discussed the diagnosis with the patient and the intended plan as seen in the 
orders above. The patient understands and agees with the plan. The patient has  
received an after visit summary and questions were answered concerning 
future plans Patient labs and/or xrays were reviewed Past records were reviewed. PLAN: 
. Orders Placed This Encounter  HEPATITIS C AB  
 MICROALBUMIN, UR, RAND W/ MICROALB/CREAT RATIO  HEMOGLOBIN A1C WITH EAG  
 URINALYSIS W/ RFLX MICROSCOPIC  CBC WITH AUTOMATED DIFF  
 METABOLIC PANEL, COMPREHENSIVE  LIPID PANEL  
 PROSTATE SPECIFIC AG  
 REFERRAL TO OPHTHALMOLOGY  AMB POC EKG ROUTINE W/ 12 LEADS, INTER & REP  
 amLODIPine (NORVASC) 5 mg tablet  atenolol (TENORMIN) 50 mg tablet  colchicine 0.6 mg tablet  tamsulosin (FLOMAX) 0.4 mg capsule  inulin (FIBER CHOICE, INULIN,) 1.5 gram chew  cholecalciferol, vitamin D3, (VITAMIN D3) 2,000 unit tab  ascorbic acid, vitamin C, (VITAMIN C) 500 mg tablet  finasteride (PROSCAR) 5 mg tablet  emollient topical cream  
 
 
Follow-up Disposition: 
Return in about 3 months (around 11/1/2018). ATTENTION:  
This medical record was transcribed using an electronic medical records system. Although proofread, it may and can contain electronic and spelling errors. Other human spelling and other errors may be present. Corrections may be executed at a later time. Please feel free to contact us for any clarifications as needed. Discussed the patient's BMI with him. The BMI follow up plan is as follows:  
 
dietary management education, guidance, and counseling 
encourage exercise 
monitor weight 
prescribed dietary intake An After Visit Summary was printed and given to the patient.

## 2018-08-01 NOTE — PATIENT INSTRUCTIONS
Body Mass Index: Care Instructions  Your Care Instructions    Body mass index (BMI) can help you see if your weight is raising your risk for health problems. It uses a formula to compare how much you weigh with how tall you are. · A BMI lower than 18.5 is considered underweight. · A BMI between 18.5 and 24.9 is considered healthy. · A BMI between 25 and 29.9 is considered overweight. A BMI of 30 or higher is considered obese. If your BMI is in the normal range, it means that you have a lower risk for weight-related health problems. If your BMI is in the overweight or obese range, you may be at increased risk for weight-related health problems, such as high blood pressure, heart disease, stroke, arthritis or joint pain, and diabetes. If your BMI is in the underweight range, you may be at increased risk for health problems such as fatigue, lower protection (immunity) against illness, muscle loss, bone loss, hair loss, and hormone problems. BMI is just one measure of your risk for weight-related health problems. You may be at higher risk for health problems if you are not active, you eat an unhealthy diet, or you drink too much alcohol or use tobacco products. Follow-up care is a key part of your treatment and safety. Be sure to make and go to all appointments, and call your doctor if you are having problems. It's also a good idea to know your test results and keep a list of the medicines you take. How can you care for yourself at home? · Practice healthy eating habits. This includes eating plenty of fruits, vegetables, whole grains, lean protein, and low-fat dairy. · If your doctor recommends it, get more exercise. Walking is a good choice. Bit by bit, increase the amount you walk every day. Try for at least 30 minutes on most days of the week. · Do not smoke. Smoking can increase your risk for health problems. If you need help quitting, talk to your doctor about stop-smoking programs and medicines. These can increase your chances of quitting for good. · Limit alcohol to 2 drinks a day for men and 1 drink a day for women. Too much alcohol can cause health problems. If you have a BMI higher than 25  · Your doctor may do other tests to check your risk for weight-related health problems. This may include measuring the distance around your waist. A waist measurement of more than 40 inches in men or 35 inches in women can increase the risk of weight-related health problems. · Talk with your doctor about steps you can take to stay healthy or improve your health. You may need to make lifestyle changes to lose weight and stay healthy, such as changing your diet and getting regular exercise. If you have a BMI lower than 18.5  · Your doctor may do other tests to check your risk for health problems. · Talk with your doctor about steps you can take to stay healthy or improve your health. You may need to make lifestyle changes to gain or maintain weight and stay healthy, such as getting more healthy foods in your diet and doing exercises to build muscle. Where can you learn more? Go to http://stephan-genna.info/. Enter S176 in the search box to learn more about \"Body Mass Index: Care Instructions. \"  Current as of: October 13, 2016  Content Version: 11.4  © 5167-2838 Healthwise, Incorporated. Care instructions adapted under license by [employer] (which disclaims liability or warranty for this information). If you have questions about a medical condition or this instruction, always ask your healthcare professional. [address] any warranty or liability for your use of this information.

## 2018-08-01 NOTE — MR AVS SNAPSHOT
[address] [facility] 
 
 
 [address] [address] 
[phone] Patient: Moon Antonio Sr. MRN: [id number] ZYA:[birth year] Visit Information Date & Time Provider Department Dept. Phone Encounter #  
 8/1/2018  3:00 PM MD Miranda French [facility] [phone] [id number] Follow-up Instructions Return in about 3 months (around 11/1/2018). Follow-up and Disposition History Your Appointments 11/14/2018  2:45 PM  
Any with Naif Bernard MD  
[address] [place] [address] and Primary Care [address] [place] [address]) Appt Note: 3 Month Follow up  
 [address] [facility]  
  
   
 [address] [id number] Upcoming Health Maintenance Date Due Hepatitis C Screening 1955 FOOT EXAM Q1 6/21/1963 MICROALBUMIN Q1 6/21/1963 EYE EXAM RETINAL OR DILATED Q1 6/21/1963 HEMOGLOBIN A1C Q6M 10/24/2015 LIPID PANEL Q1 4/24/2016 GLAUCOMA SCREENING Q2Y 6/21/2018 Influenza Age 5 to Adult 8/1/2018 Pneumococcal 65+ Low/Medium Risk (1 of 2 - PCV13) 8/1/2019* DTaP/Tdap/Td series (1 - Tdap) 8/1/2019* COLONOSCOPY 5/31/2020 *Topic was postponed. The date shown is not the original due date. Allergies as of 8/1/2018  Review Complete On: 8/1/2018 By: Naif Bernard MD  
 No Known Allergies Current Immunizations  Never Reviewed No immunizations on file. Not reviewed this visit You Were Diagnosed With   
  
 Codes Comments Essential hypertension    -  Primary ICD-10-CM: I10 
ICD-9-CM: 401.9 Severe obesity (BMI 35.0-39.9) (HCC)     ICD-10-CM: E66.01 
ICD-9-CM: 278.01 Obstructive sleep apnea     ICD-10-CM: G47.33 
ICD-9-CM: 327.23 Gout, unspecified cause, unspecified chronicity, unspecified site     ICD-10-CM: M10.9 ICD-9-CM: 274.9 Abnormal finding of blood chemistry     ICD-10-CM: R79.9 ICD-9-CM: 790.6 Nocturia     ICD-10-CM: R35.1 ICD-9-CM: 788.43   
 ACP (advance care planning)     ICD-10-CM: Z71.89 ICD-9-CM: V65.49 Vitals BP Pulse Temp Resp Height(growth percentile) Weight(growth percentile) 134/82 64 97.5 °F (36.4 °C) (Oral) 16 6' 1\" (1.854 m) 269 lb 6.4 oz (122.2 kg) SpO2 BMI Smoking Status 96% 35.54 kg/m2 Never Smoker Vitals History BMI and BSA Data Body Mass Index Body Surface Area 35.54 kg/m 2 2.51 m 2 Preferred Pharmacy Pharmacy Name Phone [facility] [phone] Your Updated Medication List  
  
   
This list is accurate as of 8/1/18  5:14 PM.  Always use your most recent med list. amLODIPine 5 mg tablet Commonly known as:  [place] Alfredo TAKE 1 TABLET DAILY  
  
 ascorbic acid (vitamin C) 500 mg tablet Commonly known as:  VITAMIN C Take 2 Tabs by mouth daily. atenolol 50 mg tablet Commonly known as:  TENORMIN Take 1 Tab by mouth daily. cholecalciferol (vitamin D3) 2,000 unit Tab Commonly known as:  VITAMIN D3 Take 1 Tab by mouth daily. colchicine 0.6 mg tablet Take 1 Tab by mouth daily. emollient topical cream  
Apply  to affected area as needed. finasteride 5 mg tablet Commonly known as:  PROSCAR  
TAKE 1 TABLET BY MOUTH EVERY DAY  
  
 inulin 1.5 gram Chew Commonly known as:  FIBER CHOICE (INULIN) Take 2 Tabs by mouth daily. tamsulosin 0.4 mg capsule Commonly known as:  FLOMAX Take 1 Cap by mouth daily. Prescriptions Printed Refills  
 amLODIPine (NORVASC) 5 mg tablet 3 Sig: TAKE 1 TABLET DAILY Class: Print  
 atenolol (TENORMIN) 50 mg tablet 3 Sig: Take 1 Tab by mouth daily. Class: Print Route: Oral  
 colchicine 0.6 mg tablet 3 Sig: Take 1 Tab by mouth daily. Class: Print Route: Oral  
 tamsulosin (FLOMAX) 0.4 mg capsule 3 Sig: Take 1 Cap by mouth daily. Class: Print Route: Oral  
 inulin (FIBER CHOICE, INULIN,) 1.5 gram chew 11 Sig: Take 2 Tabs by mouth daily. Class: Print Route: Oral  
 cholecalciferol, vitamin D3, (VITAMIN D3) 2,000 unit tab 11 Sig: Take 1 Tab by mouth daily. Class: Print Route: Oral  
 ascorbic acid, vitamin C, (VITAMIN C) 500 mg tablet 11 Sig: Take 2 Tabs by mouth daily. Class: Print Route: Oral  
 finasteride (PROSCAR) 5 mg tablet 4 Sig: TAKE 1 TABLET BY MOUTH EVERY DAY Class: Print  
 emollient topical cream 11 Sig: Apply  to affected area as needed. Class: Print Route: Topical  
  
We Performed the Following AMB POC EKG ROUTINE W/ 12 LEADS, INTER & REP [26876 CPT(R)] CBC WITH AUTOMATED DIFF [06441 CPT(R)] COLLECTION VENOUS BLOOD,VENIPUNCTURE [id number] CPT(R)] FULL CODE [COD2 Custom] HEMOGLOBIN A1C WITH EAG [70744 CPT(R)] HEPATITIS C AB [16827 CPT(R)] LIPID PANEL [85116 CPT(R)] METABOLIC PANEL, COMPREHENSIVE [39099 CPT(R)] MICROALBUMIN, UR, RAND W/ MICROALB/CREAT RATIO [id number] CPT(R)] PSA, DIAGNOSTIC (PROSTATE SPECIFIC AG) [id number] CPT(R)] REFERRAL TO OPHTHALMOLOGY [REF57 Custom] URINALYSIS W/ RFLX MICROSCOPIC [69550 CPT(R)] Follow-up Instructions Return in about 3 months (around 11/1/2018). Referral Information Referral ID Referred By Referred To  
  
 [id number] Lino MORA Not Available Visits Status Start Date End Date 1 New Request 8/1/18 8/1/19 If your referral has a status of pending review or denied, additional information will be sent to support the outcome of this decision. Patient Instructions Body Mass Index: Care Instructions Your Care Instructions Body mass index (BMI) can help you see if your weight is raising your risk for health problems. It uses a formula to compare how much you weigh with how tall you are. · A BMI lower than 18.5 is considered underweight. · A BMI between 18.5 and 24.9 is considered healthy. · A BMI between 25 and 29.9 is considered overweight. A BMI of 30 or higher is considered obese. If your BMI is in the normal range, it means that you have a lower risk for weight-related health problems. If your BMI is in the overweight or obese range, you may be at increased risk for weight-related health problems, such as high blood pressure, heart disease, stroke, arthritis or joint pain, and diabetes. If your BMI is in the underweight range, you may be at increased risk for health problems such as fatigue, lower protection (immunity) against illness, muscle loss, bone loss, hair loss, and hormone problems. BMI is just one measure of your risk for weight-related health problems. You may be at higher risk for health problems if you are not active, you eat an unhealthy diet, or you drink too much alcohol or use tobacco products. Follow-up care is a key part of your treatment and safety. Be sure to make and go to all appointments, and call your doctor if you are having problems. It's also a good idea to know your test results and keep a list of the medicines you take. How can you care for yourself at home? · Practice healthy eating habits. This includes eating plenty of fruits, vegetables, whole grains, lean protein, and low-fat dairy. · If your doctor recommends it, get more exercise. Walking is a good choice. Bit by bit, increase the amount you walk every day. Try for at least 30 minutes on most days of the week. · Do not smoke. Smoking can increase your risk for health problems. If you need help quitting, talk to your doctor about stop-smoking programs and medicines. These can increase your chances of quitting for good. · Limit alcohol to 2 drinks a day for men and 1 drink a day for women. Too much alcohol can cause health problems. If you have a BMI higher than 25 · Your doctor may do other tests to check your risk for weight-related health problems. This may include measuring the distance around your waist. A waist measurement of more than 40 inches in men or 35 inches in women can increase the risk of weight-related health problems. · Talk with your doctor about steps you can take to stay healthy or improve your health. You may need to make lifestyle changes to lose weight and stay healthy, such as changing your diet and getting regular exercise. If you have a BMI lower than 18.5 · Your doctor may do other tests to check your risk for health problems. · Talk with your doctor about steps you can take to stay healthy or improve your health. You may need to make lifestyle changes to gain or maintain weight and stay healthy, such as getting more healthy foods in your diet and doing exercises to build muscle. Where can you learn more? Go to http://stephan-genna.info/. Enter S176 in the search box to learn more about \"Body Mass Index: Care Instructions. \" Current as of: October 13, 2016 Content Version: 11.4 © 6820-8557 [employer] Inc.. Care instructions adapted under license by [employer] (which disclaims liability or warranty for this information). If you have questions about a medical condition or this instruction, always ask your healthcare professional. [address] any warranty or liability for your use of this information. Introducing [facility]! Dear Matt Alejandre: Thank you for requesting a Toodalu account. Our records indicate that you already have an active Toodalu account. You can access your account anytime at [email or web address] Did you know that you can access your hospital and ER discharge instructions at any time in Toodalu? You can also review all of your test results from your hospital stay or ER visit. Additional Information If you have questions, please visit the Frequently Asked Questions section of the [facility] website at [email or web address] Remember, [facility] is NOT to be used for urgent needs. For medical emergencies, dial 911. Now available from your iPhone and Android! Please provide this summary of care documentation to your next provider. Your primary care clinician is listed as Keith Carroll. If you have any questions after today's visit, please call [phone].

## 2018-08-01 NOTE — ACP (ADVANCE CARE PLANNING)
Advance Care Planning (ACP) Provider Conversation Snapshot    Date of ACP Conversation: 08/01/18  Persons included in Conversation:  patient  Length of ACP Conversation in minutes:  <16 minutes (Non-Billable)    Authorized Decision Maker (if patient is incapable of making informed decisions):    This person is:   Healthcare Agent/Medical Power of  under Advance Directive  wife        For Patients with Decision Making Capacity:   Values/Goals: Exploration of values, goals, and preferences if recovery is not expected, even with continued medical treatment in the event of:  Imminent death    Conversation Outcomes / Follow-Up Plan:   Recommended completion of Advance Directive form after review of ACP materials and conversation with prospective healthcare agent

## 2018-08-02 LAB
ALBUMIN SERPL-MCNC: 4.2 G/DL (ref 3.6–4.8)
ALBUMIN/CREAT UR: 3.2 MG/G CREAT (ref 0–30)
ALBUMIN/GLOB SERPL: 1.4 {RATIO} (ref 1.2–2.2)
ALP SERPL-CCNC: 75 IU/L (ref 39–117)
ALT SERPL-CCNC: 21 IU/L (ref 0–44)
APPEARANCE UR: CLEAR
AST SERPL-CCNC: 26 IU/L (ref 0–40)
BACTERIA #/AREA URNS HPF: ABNORMAL /[HPF]
BASOPHILS # BLD AUTO: 0.1 X10E3/UL (ref 0–0.2)
BASOPHILS NFR BLD AUTO: 1 %
BILIRUB SERPL-MCNC: 0.4 MG/DL (ref 0–1.2)
BILIRUB UR QL STRIP: NEGATIVE
BUN SERPL-MCNC: 10 MG/DL (ref 8–27)
BUN/CREAT SERPL: 7 (ref 10–24)
CALCIUM SERPL-MCNC: 9.3 MG/DL (ref 8.6–10.2)
CASTS URNS QL MICRO: ABNORMAL /LPF
CHLORIDE SERPL-SCNC: 104 MMOL/L (ref 96–106)
CHOLEST SERPL-MCNC: 163 MG/DL (ref 100–199)
CO2 SERPL-SCNC: 21 MMOL/L (ref 20–29)
COLOR UR: YELLOW
CREAT SERPL-MCNC: 1.39 MG/DL (ref 0.76–1.27)
CREAT UR-MCNC: 127 MG/DL
CRYSTALS URNS MICRO: ABNORMAL
EOSINOPHIL # BLD AUTO: 0.2 X10E3/UL (ref 0–0.4)
EOSINOPHIL NFR BLD AUTO: 4 %
EPI CELLS #/AREA URNS HPF: ABNORMAL /HPF
ERYTHROCYTE [DISTWIDTH] IN BLOOD BY AUTOMATED COUNT: 14.2 % (ref 12.3–15.4)
EST. AVERAGE GLUCOSE BLD GHB EST-MCNC: 131 MG/DL
GLOBULIN SER CALC-MCNC: 2.9 G/DL (ref 1.5–4.5)
GLUCOSE SERPL-MCNC: 64 MG/DL (ref 65–99)
GLUCOSE UR QL: NEGATIVE
HBA1C MFR BLD: 6.2 % (ref 4.8–5.6)
HCT VFR BLD AUTO: 42.2 % (ref 37.5–51)
HCV AB S/CO SERPL IA: <0.1 S/CO RATIO (ref 0–0.9)
HDLC SERPL-MCNC: 38 MG/DL
HGB BLD-MCNC: 13.6 G/DL (ref 13–17.7)
HGB UR QL STRIP: ABNORMAL
IMM GRANULOCYTES # BLD: 0 X10E3/UL (ref 0–0.1)
IMM GRANULOCYTES NFR BLD: 0 %
KETONES UR QL STRIP: NEGATIVE
LDLC SERPL CALC-MCNC: 80 MG/DL (ref 0–99)
LEUKOCYTE ESTERASE UR QL STRIP: NEGATIVE
LYMPHOCYTES # BLD AUTO: 2.4 X10E3/UL (ref 0.7–3.1)
LYMPHOCYTES NFR BLD AUTO: 45 %
MCH RBC QN AUTO: 27.8 PG (ref 26.6–33)
MCHC RBC AUTO-ENTMCNC: 32.2 G/DL (ref 31.5–35.7)
MCV RBC AUTO: 86 FL (ref 79–97)
MICRO URNS: ABNORMAL
MICROALBUMIN UR-MCNC: 4.1 UG/ML
MONOCYTES # BLD AUTO: 0.5 X10E3/UL (ref 0.1–0.9)
MONOCYTES NFR BLD AUTO: 10 %
MUCOUS THREADS URNS QL MICRO: PRESENT
NEUTROPHILS # BLD AUTO: 2.1 X10E3/UL (ref 1.4–7)
NEUTROPHILS NFR BLD AUTO: 40 %
NITRITE UR QL STRIP: NEGATIVE
PH UR STRIP: 8 [PH] (ref 5–7.5)
PLATELET # BLD AUTO: 227 X10E3/UL (ref 150–379)
POTASSIUM SERPL-SCNC: 4.1 MMOL/L (ref 3.5–5.2)
PROT SERPL-MCNC: 7.1 G/DL (ref 6–8.5)
PROT UR QL STRIP: NEGATIVE
PSA SERPL-MCNC: 0.2 NG/ML (ref 0–4)
RBC # BLD AUTO: 4.9 X10E6/UL (ref 4.14–5.8)
RBC #/AREA URNS HPF: ABNORMAL /HPF
SODIUM SERPL-SCNC: 144 MMOL/L (ref 134–144)
SP GR UR: 1.02 (ref 1–1.03)
TRIGL SERPL-MCNC: 227 MG/DL (ref 0–149)
UNIDENT CRYS URNS QL MICRO: PRESENT
UROBILINOGEN UR STRIP-MCNC: 1 MG/DL (ref 0.2–1)
VLDLC SERPL CALC-MCNC: 45 MG/DL (ref 5–40)
WBC # BLD AUTO: 5.3 X10E3/UL (ref 3.4–10.8)
WBC #/AREA URNS HPF: ABNORMAL /HPF

## 2018-08-03 NOTE — PROGRESS NOTES
The patient comes in today for evaluation for ongoing care. He 
was seeing Dr. Karli Santamaria and subsequently was at [facility] and now that he has turned 70, they have 
released him except that he can get labs and medications as 
formerly. The patient denies specific complaints and comes in 
primarily for a physical and to reestablish a relationship with 
the practice. He denies specific complaints.

## 2018-08-07 NOTE — PROGRESS NOTES
very poor audio quality - low volume - static - please read  carefully for any potential errors and/or omissions    The patient is requesting renewal of all of her medications which  were written for him which has been accomplished and attached. We did not renew the Tricor until we get the lab results. He  confirms  triglyceride medication will we consider; however,  we suggest he come back on . His BMI reflects increased physical activity exercising 30  minutes 5 days and a heart healthy weight-reducing diet. Blood pressure control is approaching goal.    He will return to the office in 3 to 4 months, sooner if he needs  to, and he would like to see Dr. Galo Dietrich. He should make an  appointment with for his next visit.

## 2018-11-07 RX ORDER — ATENOLOL 50 MG/1
50 TABLET ORAL DAILY
Qty: 90 TAB | Refills: 3 | Status: SHIPPED | OUTPATIENT
Start: 2018-11-07 | End: 2020-01-15 | Stop reason: SDUPTHER

## 2018-11-07 RX ORDER — TAMSULOSIN HYDROCHLORIDE 0.4 MG/1
0.4 CAPSULE ORAL DAILY
Qty: 90 CAP | Refills: 3 | Status: SHIPPED | OUTPATIENT
Start: 2018-11-07 | End: 2020-01-15 | Stop reason: SDUPTHER

## 2018-11-07 RX ORDER — FINASTERIDE 5 MG/1
TABLET, FILM COATED ORAL
Qty: 90 TAB | Refills: 4 | Status: SHIPPED | OUTPATIENT
Start: 2018-11-07 | End: 2020-01-15 | Stop reason: SDUPTHER

## 2018-11-07 RX ORDER — AMLODIPINE BESYLATE 5 MG/1
TABLET ORAL
Qty: 90 TAB | Refills: 3 | Status: SHIPPED | OUTPATIENT
Start: 2018-11-07 | End: 2020-01-15 | Stop reason: SDUPTHER

## 2018-11-28 ENCOUNTER — OFFICE VISIT (OUTPATIENT)
Dept: INTERNAL MEDICINE CLINIC | Age: 63
End: 2018-11-28

## 2018-11-28 VITALS
SYSTOLIC BLOOD PRESSURE: 142 MMHG | WEIGHT: 270 LBS | HEIGHT: 73 IN | BODY MASS INDEX: 35.78 KG/M2 | HEART RATE: 73 BPM | DIASTOLIC BLOOD PRESSURE: 82 MMHG | OXYGEN SATURATION: 97 % | TEMPERATURE: 97.8 F | RESPIRATION RATE: 18 BRPM

## 2018-11-28 DIAGNOSIS — M10.9 GOUT, UNSPECIFIED CAUSE, UNSPECIFIED CHRONICITY, UNSPECIFIED SITE: ICD-10-CM

## 2018-11-28 DIAGNOSIS — I10 ESSENTIAL HYPERTENSION: Primary | ICD-10-CM

## 2018-11-28 DIAGNOSIS — E11.21 TYPE 2 DIABETES WITH NEPHROPATHY (HCC): ICD-10-CM

## 2018-11-28 DIAGNOSIS — G47.33 OBSTRUCTIVE SLEEP APNEA: ICD-10-CM

## 2018-11-28 DIAGNOSIS — E66.01 SEVERE OBESITY WITH BODY MASS INDEX (BMI) OF 35.0 TO 39.9 WITH SERIOUS COMORBIDITY (HCC): ICD-10-CM

## 2018-11-28 NOTE — PROGRESS NOTES
SPORTS MEDICINE AND PRIMARY CARE  Cortney Gordon MD, [address] [address]  Phone:  [phone]  Fax: [phone]       Chief Complaint   Patient presents with    Annual Wellness Visit   .    +++  SUBJECTIVE:    Lucia Fofana is a 70 y.o. male Patient returns today with known history of primary hypertension, hypertriglyceridemia, obstructive sleep apnea, obesity, gout, type 2 diabetes, diabetic nephropathy, BPH with intermittent symptoms and is seen for evaluation. He has no new complaints except that of his knee where he gets tight episodically. Current Outpatient Medications   Medication Sig Dispense Refill    tamsulosin (FLOMAX) 0.4 mg capsule Take 1 Cap by mouth daily. 90 Cap 3    finasteride (PROSCAR) 5 mg tablet TAKE 1 TABLET BY MOUTH EVERY DAY 90 Tab 4    atenolol (TENORMIN) 50 mg tablet Take 1 Tab by mouth daily. 90 Tab 3    amLODIPine (NORVASC) 5 mg tablet TAKE 1 TABLET DAILY 90 Tab 3    colchicine 0.6 mg tablet Take 1 Tab by mouth daily. 90 Tab 3    inulin (FIBER CHOICE, INULIN,) 1.5 gram chew Take 2 Tabs by mouth daily. 180 Tab 11    cholecalciferol, vitamin D3, (VITAMIN D3) 2,000 unit tab Take 1 Tab by mouth daily. 90 Tab 11    ascorbic acid, vitamin C, (VITAMIN C) 500 mg tablet Take 2 Tabs by mouth daily. 90 Tab 11    emollient topical cream Apply  to affected area as needed.  453 g 11     Past Medical History:   Diagnosis Date    BPH (benign prostatic hyperplasia)     Gout     Hypercholesterolemia     Hypertension     Prostatism     S/P colonoscopic polypectomy 05/31/2017    colon polyp Ian Mehta MD     Past Surgical History:   Procedure Laterality Date    HX COLONOSCOPY      HX OTHER SURGICAL      ingrown toenail repair     No Known Allergies      REVIEW OF SYSTEMS:  General: negative for - chills or fever  ENT: negative for - headaches, nasal congestion or tinnitus  Respiratory: negative for - cough, hemoptysis, shortness of breath or wheezing  Cardiovascular : negative for - chest pain, edema, palpitations or shortness of breath  Gastrointestinal: negative for - abdominal pain, blood in stools, heartburn or nausea/vomiting  Genito-Urinary: no dysuria, trouble voiding, or hematuria  Musculoskeletal: negative for - gait disturbance, joint pain, joint stiffness or joint swelling  Neurological: no TIA or stroke symptoms  Hematologic: no bruises, no bleeding, no swollen glands  Integument: no lumps, mole changes, nail changes or rash  Endocrine: no malaise/lethargy or unexpected weight changes      Social History     Socioeconomic History    Marital status:      Spouse name: Not on file    Number of children: Not on file    Years of education: Not on file    Highest education level: Not on file   Tobacco Use    Smoking status: Never Smoker    Smokeless tobacco: Never Used   Substance and Sexual Activity    Alcohol use: No    Drug use: No    Sexual activity: Yes     Partners: Female     Family History   Problem Relation Age of Onset    Cancer Mother     Hypertension Mother     Cancer Father    Habits:  He is a lifetime nonsmoker, non drinker, non drug abuser. Social History:  The patient is , lives with his wife. 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 No grandchildren yet. He is a member of [employer]. Family History:  Father  76 of lung cancer. He was a cigarette smoker. Mother  68 of cancer. He has ten siblings. One brother , which he thinks he was murdered, but the official cause was he jumped off a bridge and they think he was also doing drugs.         OBJECTIVE:    Visit Vitals  /82   Pulse 73   Temp 97.8 °F (36.6 °C) (Oral)   Resp 18   Ht 6' 1\" (1.854 m)   Wt 270 lb (122.5 kg)   SpO2 97%   BMI 35.62 kg/m²     CONSTITUTIONAL: well , well nourished, appears age appropriate  EYES: perrla, eom intact  ENMT:moist mucous membranes, pharynx clear  NECK: supple. Thyroid normal  RESPIRATORY: Chest: clear bilaterally   CARDIOVASCULAR: Heart: regular rate and rhythm  GASTROINTESTINAL: Abdomen: soft, bowel sounds active  HEMATOLOGIC: no pathological lymph nodes palpated  MUSCULOSKELETAL: Extremities: no edema, pulse 1+   INTEGUMENT: No unusual rashes or suspicious skin lesions noted. Nails appear normal.  NEUROLOGIC: non-focal exam   MENTAL STATUS: alert and oriented, appropriate affect           ASSESSMENT:  1. Essential hypertension    2. Obstructive sleep apnea    3. Gout, unspecified cause, unspecified chronicity, unspecified site    4. Severe obesity with body mass index (BMI) of 35.0 to 39.9 with serious comorbidity ([facility] [address]    5. Type 2 diabetes with nephropathy [facility])      Patient has signs of prostatism, not quite symptomatic enough to be referred. His blood pressure is at the upper limits of normal, but acceptable, and no adjustments will be made. I suspect at home his blood pressure is in the normotensive range. He wondered about his colonoscopy. When we review the colonoscopy we note the last two visits he had polyps removed. Dr. Medley Friend has requested a repeat colonoscopy three years from the last one. He complains of tightness in the knee. We suggest Aleve whenever it is tight and when not tight suggest no medication. We further suggest he avoid steps and stop jogging for exercise. Walking, a stationary bicycle or even water aerobics would be better. We note his BMI. Encouraged physical activity and a heart healthy, diabetic, weight reducing diet beginning in January. He will be back to see me in three to four months, sooner if he has any problems. I have discussed the diagnosis with the patient and the intended plan as seen in the  orders above. The patient understands and agees with the plan.   The patient has   received an after visit summary and questions were answered concerning  future plans  Patient labs and/or xrays were reviewed  Past records were reviewed. PLAN:  .  Orders Placed This Encounter    METABOLIC PANEL, BASIC    HEMOGLOBIN A1C WITH EAG       Follow-up Disposition:  Return in about 3 months (around 2/28/2019). ATTENTION:   This medical record was transcribed using an electronic medical records system. Although proofread, it may and can contain electronic and spelling errors. Other human spelling and other errors may be present. Corrections may be executed at a later time. Please feel free to contact us for any clarifications as needed.

## 2018-11-29 LAB
BUN SERPL-MCNC: 12 MG/DL (ref 8–27)
BUN/CREAT SERPL: 12 (ref 10–24)
CALCIUM SERPL-MCNC: 9.3 MG/DL (ref 8.6–10.2)
CHLORIDE SERPL-SCNC: 102 MMOL/L (ref 96–106)
CO2 SERPL-SCNC: 24 MMOL/L (ref 20–29)
CREAT SERPL-MCNC: 1.01 MG/DL (ref 0.76–1.27)
EST. AVERAGE GLUCOSE BLD GHB EST-MCNC: 134 MG/DL
GLUCOSE SERPL-MCNC: 104 MG/DL (ref 65–99)
HBA1C MFR BLD: 6.3 % (ref 4.8–5.6)
POTASSIUM SERPL-SCNC: 4.8 MMOL/L (ref 3.5–5.2)
SODIUM SERPL-SCNC: 141 MMOL/L (ref 134–144)

## 2019-03-06 ENCOUNTER — OFFICE VISIT (OUTPATIENT)
Dept: INTERNAL MEDICINE CLINIC | Age: 64
End: 2019-03-06

## 2019-03-06 VITALS
DIASTOLIC BLOOD PRESSURE: 73 MMHG | SYSTOLIC BLOOD PRESSURE: 131 MMHG | WEIGHT: 268 LBS | HEIGHT: 73 IN | TEMPERATURE: 97.8 F | BODY MASS INDEX: 35.52 KG/M2 | HEART RATE: 67 BPM | RESPIRATION RATE: 16 BRPM | OXYGEN SATURATION: 98 %

## 2019-03-06 DIAGNOSIS — E88.81 INSULIN RESISTANCE: ICD-10-CM

## 2019-03-06 DIAGNOSIS — E11.21 TYPE 2 DIABETES WITH NEPHROPATHY (HCC): ICD-10-CM

## 2019-03-06 DIAGNOSIS — M10.9 GOUT, UNSPECIFIED CAUSE, UNSPECIFIED CHRONICITY, UNSPECIFIED SITE: ICD-10-CM

## 2019-03-06 DIAGNOSIS — G47.33 OBSTRUCTIVE SLEEP APNEA: ICD-10-CM

## 2019-03-06 DIAGNOSIS — E66.9 OBESITY (BMI 30-39.9): ICD-10-CM

## 2019-03-06 DIAGNOSIS — E78.1 HYPERTRIGLYCERIDEMIA: ICD-10-CM

## 2019-03-06 DIAGNOSIS — I10 ESSENTIAL HYPERTENSION: Primary | ICD-10-CM

## 2019-03-06 DIAGNOSIS — E66.01 SEVERE OBESITY WITH BODY MASS INDEX (BMI) OF 35.0 TO 39.9 WITH SERIOUS COMORBIDITY (HCC): ICD-10-CM

## 2019-03-06 PROBLEM — S76.319A HAMSTRING MUSCLE STRAIN: Status: ACTIVE | Noted: 2019-03-06

## 2019-03-06 RX ORDER — TRIAMCINOLONE ACETONIDE 1 MG/G
CREAM TOPICAL 2 TIMES DAILY
Qty: 45 G | Refills: 11 | Status: SHIPPED | OUTPATIENT
Start: 2019-03-06 | End: 2021-01-13

## 2019-03-06 NOTE — PATIENT INSTRUCTIONS
Hamstring Strain: Care Instructions  Your Care Instructions    A hamstring strain happens when you overstretch, or pull, the muscles that run down the back of your thigh. It can happen when you exercise or lift something or if you're injured in an accident. You may feel pain and tenderness that's worse when you move your injured leg. The back of your thigh may be swollen and bruised. If you have a bad strain, you may not be able to move your leg normally. While a minor strain often heals well with rest and other treatment, a severe strain may require medical treatment. If a severe strain isn't treated, you may have long-lasting problems. Follow-up care is a key part of your treatment and safety. Be sure to make and go to all appointments, and call your doctor if you are having problems. It's also a good idea to know your test results and keep a list of the medicines you take. How can you care for yourself at home? · Rest your injured leg. Don't put weight on it for a day or two. If your doctor advises you to, use crutches to rest the leg. · Put ice or a cold pack on the back of your thigh for 10 to 20 minutes at a time to stop swelling. Put a thin cloth between the ice and your skin. · Wrapping your thigh with an elastic bandage (such as an Ace wrap), will help decrease swelling. Don't wrap it too tightly, since this can cause more swelling below the affected area. · Elevate your thigh on pillows while applying ice and anytime you are sitting or lying down. · Ask your doctor if you can take an over-the-counter pain medicine, such as acetaminophen (Tylenol), ibuprofen (Advil, Motrin), or naproxen (Aleve). Be safe with medicines. Read and follow all instructions on the label. · Don't do anything that makes the pain worse. Return to your usual level of activity slowly. When should you call for help?   Call your doctor now or seek immediate medical care if:    · You have severe or increasing pain.     · You have tingling, weakness, or numbness in your injured leg.     · You cannot move your injured leg.    Watch closely for changes in your health, and be sure to contact your doctor if:    · You do not get better as expected. Where can you learn more? Go to http://stephan-genna.info/. Enter N652 in the search box to learn more about \"Hamstring Strain: Care Instructions. \"  Current as of: September 20, 2018  Content Version: 11.9  © 7554-5078 [employer]. Care instructions adapted under license by [employer] (which disclaims liability or warranty for this information). If you have questions about a medical condition or this instruction, always ask your healthcare professional. [address] any warranty or liability for your use of this information.

## 2019-03-06 NOTE — PROGRESS NOTES
1. Have you been to the ER, urgent care clinic since your last visit? Hospitalized since your last visit? No    2. Have you seen or consulted any other health care providers outside of the [address] [place] [address] since your last visit? Include any pap smears or colon screening.  No     Wants to discuss knees, bump in the back of his head,stiffness when walking  Puffiness under eyes

## 2019-03-08 LAB
EST. AVERAGE GLUCOSE BLD GHB EST-MCNC: 131 MG/DL
HBA1C MFR BLD: 6.2 % (ref 4.8–5.6)

## 2019-07-17 ENCOUNTER — OFFICE VISIT (OUTPATIENT)
Dept: INTERNAL MEDICINE CLINIC | Age: 64
End: 2019-07-17

## 2019-07-17 VITALS
BODY MASS INDEX: 36.27 KG/M2 | OXYGEN SATURATION: 94 % | DIASTOLIC BLOOD PRESSURE: 80 MMHG | HEART RATE: 66 BPM | WEIGHT: 273.7 LBS | RESPIRATION RATE: 16 BRPM | HEIGHT: 73 IN | TEMPERATURE: 98 F | SYSTOLIC BLOOD PRESSURE: 139 MMHG

## 2019-07-17 DIAGNOSIS — S76.319D HAMSTRING MUSCLE STRAIN, UNSPECIFIED LATERALITY, SUBSEQUENT ENCOUNTER: ICD-10-CM

## 2019-07-17 DIAGNOSIS — G47.33 OBSTRUCTIVE SLEEP APNEA: ICD-10-CM

## 2019-07-17 DIAGNOSIS — E78.1 HYPERTRIGLYCERIDEMIA: ICD-10-CM

## 2019-07-17 DIAGNOSIS — E11.21 TYPE 2 DIABETES WITH NEPHROPATHY (HCC): ICD-10-CM

## 2019-07-17 DIAGNOSIS — I10 ESSENTIAL HYPERTENSION: Primary | ICD-10-CM

## 2019-07-17 DIAGNOSIS — E66.9 OBESITY (BMI 30-39.9): ICD-10-CM

## 2019-07-17 PROBLEM — M65.311 TRIGGER THUMB OF RIGHT HAND: Status: ACTIVE | Noted: 2019-07-17

## 2019-07-17 NOTE — PROGRESS NOTES
SPORTS MEDICINE AND PRIMARY CARE  Booker Waddell MD, Issac Marshall[address] [address]  Phone:  [phone]  Fax: [phone]       Chief Complaint   Patient presents with    Hypertension   . SUBJECTIVE:    Alhaji Hoskins Sr. is a 75 y.o. male Patient returns today with known history of primary hypertension, DM with nephropathy, obstructive sleep apnea, obesity, hypertriglyceridemia, hamstring muscle strain, and is seen for evaluation. We note that his hemoglobin A1c on his last visit in March was 6.2, indicating excellent control with diet alone. Patient comes in complaining of right thumb stiffness in the morning when he first gets up. He complains that his left leg at times when he first gets up feels like it is going to give way, but has not actually done that. Patient is seen for evaluation. Current Outpatient Medications   Medication Sig Dispense Refill    triamcinolone acetonide (KENALOG) 0.1 % topical cream Apply  to affected area two (2) times a day. 45 g 11    tamsulosin (FLOMAX) 0.4 mg capsule Take 1 Cap by mouth daily. 90 Cap 3    finasteride (PROSCAR) 5 mg tablet TAKE 1 TABLET BY MOUTH EVERY DAY 90 Tab 4    atenolol (TENORMIN) 50 mg tablet Take 1 Tab by mouth daily. 90 Tab 3    amLODIPine (NORVASC) 5 mg tablet TAKE 1 TABLET DAILY 90 Tab 3    colchicine 0.6 mg tablet Take 1 Tab by mouth daily. 90 Tab 3    inulin (FIBER CHOICE, INULIN,) 1.5 gram chew Take 2 Tabs by mouth daily. 180 Tab 11    cholecalciferol, vitamin D3, (VITAMIN D3) 2,000 unit tab Take 1 Tab by mouth daily. 90 Tab 11    ascorbic acid, vitamin C, (VITAMIN C) 500 mg tablet Take 2 Tabs by mouth daily. 90 Tab 11    emollient topical cream Apply  to affected area as needed.  453 g 11     Past Medical History:   Diagnosis Date    BPH (benign prostatic hyperplasia)     Gout     Hamstring muscle strain 03/06/2019    Hypercholesterolemia     Hypertension     IGT (impaired glucose tolerance) 08/01/2018    Prostatism     S/P colonoscopic polypectomy 05/31/2017    colon polyp Lily Coe MD    Trigger thumb of right hand 07/17/2019     Past Surgical History:   Procedure Laterality Date    COLONOSCOPY N/A 5/31/2017    [facility]    HX COLONOSCOPY      HX OTHER SURGICAL      ingrown toenail repair     No Known Allergies      REVIEW OF SYSTEMS:  General: negative for - chills or fever  ENT: negative for - headaches, nasal congestion or tinnitus  Respiratory: negative for - cough, hemoptysis, shortness of breath or wheezing  Cardiovascular : negative for - chest pain, edema, palpitations or shortness of breath  Gastrointestinal: negative for - abdominal pain, blood in stools, heartburn or nausea/vomiting  Genito-Urinary: no dysuria, trouble voiding, or hematuria  Musculoskeletal: negative for - gait disturbance, joint pain, joint stiffness or joint swelling  Neurological: no TIA or stroke symptoms  Hematologic: no bruises, no bleeding, no swollen glands  Integument: no lumps, mole changes, nail changes or rash  Endocrine: no malaise/lethargy or unexpected weight changes      Social History     Socioeconomic History    Marital status:      Spouse name: Not on file    Number of children: Not on file    Years of education: Not on file    Highest education level: Not on file   Tobacco Use    Smoking status: Never Smoker    Smokeless tobacco: Never Used   Substance and Sexual Activity    Alcohol use: No    Drug use: No    Sexual activity: Yes     Partners: Female   Social History Narrative    Habits:  He is a lifetime nonsmoker, non drinker, non drug abuser.         Social History:  The patient is , lives with his wife. 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 No grandchildren yet.   He is a member of [employer].         Family History:  Father  76 of lung cancer. He was a cigarette smoker. Mother  68 of cancer. He has ten siblings. One brother , which he thinks he was murdered, but the official cause was he jumped off a bridge and they think he was also doing drugs     Family History   Problem Relation Age of Onset    Cancer Mother     Hypertension Mother     Cancer Father        OBJECTIVE:    Visit Vitals  /80   Pulse 66   Temp 98 °F (36.7 °C) (Oral)   Resp 16   Ht 6' 1\" (1.854 m)   Wt 273 lb 11.2 oz (124.1 kg)   SpO2 94%   BMI 36.11 kg/m²     CONSTITUTIONAL: well , well nourished, appears age appropriate  EYES: perrla, eom intact  ENMT:moist mucous membranes, pharynx clear  NECK: supple. Thyroid normal  RESPIRATORY: Chest: clear bilaterally   CARDIOVASCULAR: Heart: regular rate and rhythm  GASTROINTESTINAL: Abdomen: soft, bowel sounds active  HEMATOLOGIC: no pathological lymph nodes palpated  MUSCULOSKELETAL: Extremities: no edema, pulse 1+   INTEGUMENT: No unusual rashes or suspicious skin lesions noted. Nails appear normal.  NEUROLOGIC: non-focal exam   MENTAL STATUS: alert and oriented, appropriate affect           ASSESSMENT:  1. Essential hypertension    2. Type 2 diabetes with nephropathy ([address]    3. Obstructive sleep apnea    4. Obesity (BMI 30-39.9)    5. Hypertriglyceridemia    6. Hamstring muscle strain, unspecified laterality, subsequent encounter      Patient is concerned for the left leg giving way, it is founded, but we suggest exercises and strengthening exercises with quads and hamstrings as we discussed on our last visit. The triggering of the thumb I am not sure there is an exercise he can do to prevent that. Will give him information on triggering. His BP control is excellent. As discussed above, the blood sugar control is excellent on diet alone.     His BMI continues to reflect obesity, but also reflects a 5 pound weight gain since we last saw him and we cautioned him about his diet and exercise program.  He will be back to see us in four to six months, sooner if he has any problems. Will check a hemoglobin A1c today. I have discussed the diagnosis with the patient and the intended plan as seen in the  orders above. The patient understands and agees with the plan. The patient has   received an after visit summary and questions were answered concerning  future plans  Patient labs and/or xrays were reviewed  Past records were reviewed. PLAN:  .  Orders Placed This Encounter    HEMOGLOBIN A1C WITH EAG       Follow-up and Dispositions    · Return in about 4 months (around 11/17/2019). ATTENTION:   This medical record was transcribed using an electronic medical records system. Although proofread, it may and can contain electronic and spelling errors. Other human spelling and other errors may be present. Corrections may be executed at a later time. Please feel free to contact us for any clarifications as needed.

## 2019-07-17 NOTE — PROGRESS NOTES
Chief Complaint   Patient presents with    Hypertension     1. Have you been to the ER, urgent care clinic since your last visit? Hospitalized since your last visit? No    2. Have you seen or consulted any other health care providers outside of the [address] [place] [address] since your last visit? Include any pap smears or colon screening.  No

## 2019-07-18 LAB
EST. AVERAGE GLUCOSE BLD GHB EST-MCNC: 131 MG/DL
HBA1C MFR BLD: 6.2 % (ref 4.8–5.6)

## 2019-08-13 NOTE — PROGRESS NOTES
Progress Note      Patient: Kaylie Kaba               Sex: female          DOA: 8/9/2019       YOB: 1944      Age:  66 y.o.        LOS:  LOS: 4 days             CHIEF COMPLAINT:  Weakness and encephalopathy improving    Subjective:     Patient denies complaints  She declined her potassium  She wants to go home     Objective:      Visit Vitals  /46   Pulse 73   Temp 97.9 °F (36.6 °C)   Resp 18   Ht 4' 10\" (1.473 m)   Wt 34.7 kg (76 lb 9.6 oz)   SpO2 91%   BMI 16.01 kg/m²       Physical Exam:  Gen:  No distress, no complaint  Lungs:  Clear bilaterally, no wheeze or rhonchi  Heart:  Regular rate and rhythm, no murmurs or gallops  Abdomen:  Soft, non-tender, normal bowel sounds  Neuro: alert, oriented to name, not to date       Lab/Data Reviewed:  BMP:   Lab Results   Component Value Date/Time     08/13/2019 04:45 AM    K 3.0 (L) 08/13/2019 04:45 AM     (H) 08/13/2019 04:45 AM    CO2 24 08/13/2019 04:45 AM    AGAP 8 08/13/2019 04:45 AM     (H) 08/13/2019 04:45 AM    BUN 11 08/13/2019 04:45 AM    CREA 0.83 08/13/2019 04:45 AM    GFRAA >60 08/13/2019 04:45 AM    GFRNA >60 08/13/2019 04:45 AM         Assessment/Plan     Principal Problem:    CVA (cerebral vascular accident) ([address] (8/9/2019)    Active Problems:    Underweight (8/12/2019)      UTI (urinary tract infection) (8/13/2019)    UTI    Plan:  MRI neg for stroke, neuro recommending to d/c on plavix and statin. Finished atbx for UTI  Continue with PT  The patient does not ambulate almost at all at baseline. PT/OT rec SNF. Auth underway.    DVT ppx SPORTS MEDICINE AND [facility]  Renetta Miller MD, [address] [address]  Phone:  [phone]  Fax: [phone]       Chief Complaint   Patient presents with    Hypertension   . SUBJECTIVE:    Telma Kathleen Sr. is a 70 y.o. male Patient returns today with known history of BPH, primary hypertension, gout, dyslipidemia, and is seen for evaluation. Patient wants to start walking, but has some discomfort in his left leg posteriorly in the popliteal area, hamstring area. Patient also noted a knot in his right ear and pinna. He also notes tightness in his hip area, particularly with ambulation, and is seen for evaluation. Current Outpatient Medications   Medication Sig Dispense Refill    triamcinolone acetonide (KENALOG) 0.1 % topical cream Apply  to affected area two (2) times a day. 45 g 11    tamsulosin (FLOMAX) 0.4 mg capsule Take 1 Cap by mouth daily. 90 Cap 3    finasteride (PROSCAR) 5 mg tablet TAKE 1 TABLET BY MOUTH EVERY DAY 90 Tab 4    atenolol (TENORMIN) 50 mg tablet Take 1 Tab by mouth daily. 90 Tab 3    amLODIPine (NORVASC) 5 mg tablet TAKE 1 TABLET DAILY 90 Tab 3    colchicine 0.6 mg tablet Take 1 Tab by mouth daily. 90 Tab 3    inulin (FIBER CHOICE, INULIN,) 1.5 gram chew Take 2 Tabs by mouth daily. 180 Tab 11    cholecalciferol, vitamin D3, (VITAMIN D3) 2,000 unit tab Take 1 Tab by mouth daily. 90 Tab 11    ascorbic acid, vitamin C, (VITAMIN C) 500 mg tablet Take 2 Tabs by mouth daily. 90 Tab 11    emollient topical cream Apply  to affected area as needed.  453 g 11     Past Medical History:   Diagnosis Date    BPH (benign prostatic hyperplasia)     Gout     Hamstring muscle strain 03/06/2019    Hypercholesterolemia     Hypertension     IGT (impaired glucose tolerance) 08/01/2018    Prostatism     S/P colonoscopic polypectomy 05/31/2017    colon polyp Tosha Gibbs MD     Past Surgical History:   Procedure Laterality Date    COLONOSCOPY N/A 2017    [facility] ENDOSCOPY    HX COLONOSCOPY      HX OTHER SURGICAL      ingrown toenail repair     No Known Allergies      REVIEW OF SYSTEMS:  General: negative for - chills or fever  ENT: negative for - headaches, nasal congestion or tinnitus  Respiratory: negative for - cough, hemoptysis, shortness of breath or wheezing  Cardiovascular : negative for - chest pain, edema, palpitations or shortness of breath  Gastrointestinal: negative for - abdominal pain, blood in stools, heartburn or nausea/vomiting  Genito-Urinary: no dysuria, trouble voiding, or hematuria  Musculoskeletal: negative for - gait disturbance, joint pain, joint stiffness or joint swelling  Neurological: no TIA or stroke symptoms  Hematologic: no bruises, no bleeding, no swollen glands  Integument: no lumps, mole changes, nail changes or rash  Endocrine: no malaise/lethargy or unexpected weight changes      Social History     Socioeconomic History    Marital status:      Spouse name: Not on file    Number of children: Not on file    Years of education: Not on file    Highest education level: Not on file   Tobacco Use    Smoking status: Never Smoker    Smokeless tobacco: Never Used   Substance and Sexual Activity    Alcohol use: No    Drug use: No    Sexual activity: Yes     Partners: Female   Social History Narrative    Habits:  He is a lifetime nonsmoker, non drinker, non drug abuser.         Social History:  The patient is , lives with his wife. 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 No grandchildren yet. He is a member of [employer].         Family History:  Father  76 of lung cancer. He was a cigarette smoker. Mother  68 of cancer. He has ten siblings.  One brother , which he thinks he was murdered, but the official cause was he jumped off a bridge and they think he was also doing drugs     Family History   Problem Relation Age of Onset   Kiana Cancer Mother     Hypertension Mother     Cancer Father        OBJECTIVE:    Visit Vitals  /73   Pulse 67   Temp 97.8 °F (36.6 °C) (Oral)   Resp 16   Ht 6' 1\" (1.854 m)   Wt 268 lb (121.6 kg)   SpO2 98%   BMI 35.36 kg/m²     CONSTITUTIONAL: well , well nourished, appears age appropriate  EYES: perrla, eom intact  ENMT:moist mucous membranes, pharynx clear  NECK: supple. Thyroid normal  RESPIRATORY: Chest: clear bilaterally   CARDIOVASCULAR: Heart: regular rate and rhythm  GASTROINTESTINAL: Abdomen: soft, bowel sounds active  HEMATOLOGIC: no pathological lymph nodes palpated  MUSCULOSKELETAL: Extremities: no edema, pulse 1+   INTEGUMENT: No unusual rashes or suspicious skin lesions noted. Nails appear normal.  NEUROLOGIC: non-focal exam   MENTAL STATUS: alert and oriented, appropriate affect           ASSESSMENT:  1. Essential hypertension    2. Hypertriglyceridemia    3. Obstructive sleep apnea    4. Obesity (BMI 30-39.9)    5. Gout, unspecified cause, unspecified chronicity, unspecified site    6. Insulin resistance    7. Severe obesity with body mass index (BMI) of 35.0 to 39.9 with serious comorbidity ([address]    8. Type 2 diabetes with nephropathy (HCC)      Blood pressure control is adequate. His BMI is reflective of a 2 lb weight loss and we encouraged him to continue a heart healthy, weight reducing diet. He has tightness of his hamstrings bilaterally, more so on the left. I do not feel a Baker's cyst in the popliteal space where he also notes some tightness. This could be a strain of the muscle there. Will give him some stretches to do for his hamstrings and over the course of the next several months we expect some improvement. He is going to start with the bicycle and graduate to walking as his hamstrings become less tight.     His blood sugar control has been excellent with a Hgb A1c in November of 6.3. We congratulate him as he is doing a good job in that area with diabetic diet alone. Will check his metabolic status, specifically Hgb A1c. He will be back to see us in about four months, sooner if he has any problems. I have discussed the diagnosis with the patient and the intended plan as seen in the  orders above. The patient understands and agees with the plan. The patient has   received an after visit summary and questions were answered concerning  future plans  Patient labs and/or xrays were reviewed  Past records were reviewed. PLAN:  .  Orders Placed This Encounter    HEMOGLOBIN A1C WITH EAG    triamcinolone acetonide (KENALOG) 0.1 % topical cream       Follow-up Disposition:  Return in about 4 months (around 7/6/2019). ATTENTION:   This medical record was transcribed using an electronic medical records system. Although proofread, it may and can contain electronic and spelling errors. Other human spelling and other errors may be present. Corrections may be executed at a later time. Please feel free to contact us for any clarifications as needed.

## 2019-08-24 ENCOUNTER — APPOINTMENT (OUTPATIENT)
Dept: GENERAL RADIOLOGY | Age: 64
End: 2019-08-24
Attending: EMERGENCY MEDICINE
Payer: MEDICARE

## 2019-08-24 ENCOUNTER — HOSPITAL ENCOUNTER (EMERGENCY)
Age: 64
Discharge: HOME | End: 2019-08-25
Attending: EMERGENCY MEDICINE
Payer: MEDICARE

## 2019-08-24 DIAGNOSIS — R10.13 DYSPEPSIA: ICD-10-CM

## 2019-08-24 DIAGNOSIS — I45.10 RIGHT BUNDLE BRANCH BLOCK: ICD-10-CM

## 2019-08-24 DIAGNOSIS — R07.9 CHEST PAIN, UNSPECIFIED TYPE: Primary | ICD-10-CM

## 2019-08-24 PROCEDURE — 80053 COMPREHEN METABOLIC PANEL: CPT

## 2019-08-24 PROCEDURE — 84484 ASSAY OF TROPONIN QUANT: CPT

## 2019-08-24 PROCEDURE — 71046 X-RAY EXAM CHEST 2 VIEWS: CPT

## 2019-08-24 PROCEDURE — 36415 COLL VENOUS BLD VENIPUNCTURE: CPT

## 2019-08-24 PROCEDURE — 93005 ELECTROCARDIOGRAM TRACING: CPT

## 2019-08-24 PROCEDURE — 99284 EMERGENCY DEPT VISIT MOD MDM: CPT

## 2019-08-24 PROCEDURE — 85025 COMPLETE CBC W/AUTO DIFF WBC: CPT

## 2019-08-25 VITALS
HEART RATE: 71 BPM | TEMPERATURE: 97.1 F | RESPIRATION RATE: 18 BRPM | OXYGEN SATURATION: 95 % | DIASTOLIC BLOOD PRESSURE: 95 MMHG | WEIGHT: 270.28 LBS | SYSTOLIC BLOOD PRESSURE: 144 MMHG | HEIGHT: 73 IN | BODY MASS INDEX: 35.82 KG/M2

## 2019-08-25 LAB
ALBUMIN SERPL-MCNC: 4.1 G/DL (ref 3.5–5)
ALBUMIN/GLOB SERPL: 1 {RATIO} (ref 1.1–2.2)
ALP SERPL-CCNC: 98 U/L (ref 45–117)
ALT SERPL-CCNC: 36 U/L (ref 12–78)
ANION GAP SERPL CALC-SCNC: 10 MMOL/L (ref 5–15)
AST SERPL-CCNC: 23 U/L (ref 15–37)
ATRIAL RATE: 96 BPM
BASOPHILS # BLD: 0.1 K/UL (ref 0–0.1)
BASOPHILS NFR BLD: 1 % (ref 0–1)
BILIRUB SERPL-MCNC: 0.7 MG/DL (ref 0.2–1)
BUN SERPL-MCNC: 9 MG/DL (ref 6–20)
BUN/CREAT SERPL: 7 (ref 12–20)
CALCIUM SERPL-MCNC: 9.1 MG/DL (ref 8.5–10.1)
CALCULATED P AXIS, ECG09: 54 DEGREES
CALCULATED R AXIS, ECG10: -51 DEGREES
CALCULATED T AXIS, ECG11: 11 DEGREES
CHLORIDE SERPL-SCNC: 105 MMOL/L (ref 97–108)
CO2 SERPL-SCNC: 25 MMOL/L (ref 21–32)
CREAT SERPL-MCNC: 1.33 MG/DL (ref 0.7–1.3)
DIAGNOSIS, 93000: NORMAL
DIFFERENTIAL METHOD BLD: NORMAL
EOSINOPHIL # BLD: 0.2 K/UL (ref 0–0.4)
EOSINOPHIL NFR BLD: 3 % (ref 0–7)
ERYTHROCYTE [DISTWIDTH] IN BLOOD BY AUTOMATED COUNT: 14.4 % (ref 11.5–14.5)
GLOBULIN SER CALC-MCNC: 4.2 G/DL (ref 2–4)
GLUCOSE SERPL-MCNC: 144 MG/DL (ref 65–100)
HCT VFR BLD AUTO: 47.2 % (ref 36.6–50.3)
HGB BLD-MCNC: 15.7 G/DL (ref 12.1–17)
IMM GRANULOCYTES # BLD AUTO: 0 K/UL (ref 0–0.04)
IMM GRANULOCYTES NFR BLD AUTO: 0 % (ref 0–0.5)
LYMPHOCYTES # BLD: 1.9 K/UL (ref 0.8–3.5)
LYMPHOCYTES NFR BLD: 29 % (ref 12–49)
MCH RBC QN AUTO: 28.9 PG (ref 26–34)
MCHC RBC AUTO-ENTMCNC: 33.3 G/DL (ref 30–36.5)
MCV RBC AUTO: 86.8 FL (ref 80–99)
MONOCYTES # BLD: 0.8 K/UL (ref 0–1)
MONOCYTES NFR BLD: 12 % (ref 5–13)
NEUTS SEG # BLD: 3.7 K/UL (ref 1.8–8)
NEUTS SEG NFR BLD: 55 % (ref 32–75)
NRBC # BLD: 0 K/UL (ref 0–0.01)
NRBC BLD-RTO: 0 PER 100 WBC
P-R INTERVAL, ECG05: 204 MS
PLATELET # BLD AUTO: 173 K/UL (ref 150–400)
PMV BLD AUTO: 10.6 FL (ref 8.9–12.9)
POTASSIUM SERPL-SCNC: 3.5 MMOL/L (ref 3.5–5.1)
PROT SERPL-MCNC: 8.3 G/DL (ref 6.4–8.2)
Q-T INTERVAL, ECG07: 426 MS
QRS DURATION, ECG06: 162 MS
QTC CALCULATION (BEZET), ECG08: 538 MS
RBC # BLD AUTO: 5.44 M/UL (ref 4.1–5.7)
SODIUM SERPL-SCNC: 140 MMOL/L (ref 136–145)
TROPONIN I SERPL-MCNC: <0.05 NG/ML
TROPONIN I SERPL-MCNC: <0.05 NG/ML
VENTRICULAR RATE, ECG03: 96 BPM
WBC # BLD AUTO: 6.7 K/UL (ref 4.1–11.1)

## 2019-08-25 PROCEDURE — 36415 COLL VENOUS BLD VENIPUNCTURE: CPT

## 2019-08-25 PROCEDURE — 84484 ASSAY OF TROPONIN QUANT: CPT

## 2019-08-25 PROCEDURE — 96374 THER/PROPH/DIAG INJ IV PUSH: CPT

## 2019-08-25 PROCEDURE — 74011250636 HC RX REV CODE- 250/636: Performed by: EMERGENCY MEDICINE

## 2019-08-25 RX ORDER — FAMOTIDINE 10 MG/ML
INJECTION INTRAVENOUS
Status: DISCONTINUED
Start: 2019-08-25 | End: 2019-08-25 | Stop reason: HOSPADM

## 2019-08-25 RX ORDER — FAMOTIDINE 10 MG/ML
20 INJECTION INTRAVENOUS
Status: COMPLETED | OUTPATIENT
Start: 2019-08-25 | End: 2019-08-25

## 2019-08-25 RX ORDER — SODIUM CHLORIDE 9 MG/ML
INJECTION INTRAMUSCULAR; INTRAVENOUS; SUBCUTANEOUS
Status: DISCONTINUED
Start: 2019-08-25 | End: 2019-08-25 | Stop reason: HOSPADM

## 2019-08-25 RX ADMIN — FAMOTIDINE 20 MG: 10 INJECTION, SOLUTION INTRAVENOUS at 01:10

## 2019-08-25 NOTE — DISCHARGE INSTRUCTIONS

## 2019-08-25 NOTE — ED PROVIDER NOTES
EMERGENCY DEPARTMENT HISTORY AND PHYSICAL EXAM      Date: 8/24/2019  Patient Name: Cristobal Melendez.    History of Presenting Illness     Chief Complaint   Patient presents with    Chest Pain     \"indigestion pain\" since this AM. Pain is tightness, pain also in shoulder       History Provided By: Patient    HPI: Dulce Granados Sr., 75 y.o. male with PMHx significant for hypertension, hyperlipidemia, prediabetes, BPH, who presents with a chief complaint of chest pain. Patient says that around 10:30 AM he developed some slight chest and left shoulder tightness. This occurred right after he had eaten. He states that the pain has been intermittent but does seem to get worse when he eats any food. Notes that he ate a [employer] chicken sandwich around 2 PM and his pain worsened. He has taken Pepto twice with only mild relief of his symptoms. He denies any associated shortness of breath, nausea, vomiting, diarrhea, urinary symptoms. Of note, at the time of my evaluation patient is pain-free. Denies any history of DVT or PE, recent travel, surgery, hospital stay. PCP: Jimmy Bush MD    There are no other complaints, changes, or physical findings at this time. Current Outpatient Medications   Medication Sig Dispense Refill    triamcinolone acetonide (KENALOG) 0.1 % topical cream Apply  to affected area two (2) times a day. 45 g 11    tamsulosin (FLOMAX) 0.4 mg capsule Take 1 Cap by mouth daily. 90 Cap 3    finasteride (PROSCAR) 5 mg tablet TAKE 1 TABLET BY MOUTH EVERY DAY 90 Tab 4    atenolol (TENORMIN) 50 mg tablet Take 1 Tab by mouth daily. 90 Tab 3    amLODIPine (NORVASC) 5 mg tablet TAKE 1 TABLET DAILY 90 Tab 3    colchicine 0.6 mg tablet Take 1 Tab by mouth daily. 90 Tab 3    inulin (FIBER CHOICE, INULIN,) 1.5 gram chew Take 2 Tabs by mouth daily. 180 Tab 11    cholecalciferol, vitamin D3, (VITAMIN D3) 2,000 unit tab Take 1 Tab by mouth daily.  90 Tab 11    ascorbic acid, vitamin C, (VITAMIN C) 500 mg tablet Take 2 Tabs by mouth daily. 90 Tab 11    emollient topical cream Apply  to affected area as needed. 453 g 11     Past History     Past Medical History:  Past Medical History:   Diagnosis Date    BPH (benign prostatic hyperplasia)     Gout     Hamstring muscle strain 03/06/2019    Hypercholesterolemia     Hypertension     IGT (impaired glucose tolerance) 08/01/2018    Prostatism     S/P colonoscopic polypectomy 05/31/2017    colon polyp Goldie Koyanagi, MD    Trigger thumb of right hand 07/17/2019     Past Surgical History:  Past Surgical History:   Procedure Laterality Date    COLONOSCOPY N/A 5/31/2017    COL[facility]OSCOPY [facility]ie [facility]agi[facility] END[facility]COPY    HX COLONOSCOPY      HX OTHER SURGICAL      ingrown toenail repair     Family History:  Family History   Problem Relation Age of Onset    Cancer Mother     Hypertension Mother     Cancer Father      Social History:  Social History     Tobacco Use    Smoking status: Never Smoker    Smokeless tobacco: Never Used   Substance Use Topics    Alcohol use: No    Drug use: No     Allergies:  No Known Allergies  Review of Systems   Review of Systems   Constitutional: Negative for chills and fever. HENT: Negative for congestion, rhinorrhea and sore throat. Respiratory: Negative for cough and shortness of breath. Cardiovascular: Positive for chest pain. Gastrointestinal: Negative for abdominal pain, nausea and vomiting. Genitourinary: Negative for dysuria and urgency. Skin: Negative for rash. Neurological: Negative for dizziness, light-headedness and headaches. All other systems reviewed and are negative. Physical Exam   Physical Exam   Constitutional: He is oriented to person, place, and time. He appears well-developed and well-nourished. No distress. HENT:   Head: Normocephalic and atraumatic. Eyes: Pupils are equal, round, and reactive to light.  Conjunctivae and EOM are normal. Neck: Normal range of motion. Cardiovascular: Normal rate, regular rhythm and intact distal pulses. Pulmonary/Chest: Effort normal and breath sounds normal. No stridor. No respiratory distress. Abdominal: Soft. He exhibits no distension. There is no tenderness. Musculoskeletal: Normal range of motion. Neurological: He is alert and oriented to person, place, and time. Skin: Skin is warm and dry. Psychiatric: He has a normal mood and affect. Nursing note and vitals reviewed. Diagnostic Study Results   Labs -     Recent Results (from the past 12 hour(s))   EKG, 12 LEAD, INITIAL    Collection Time: 08/24/19 10:38 PM   Result Value Ref Range    Ventricular Rate 96 BPM    Atrial Rate 96 BPM    P-R Interval 204 ms    QRS Duration 162 ms    Q-T Interval 426 ms    QTC Calculation (Bezet) 538 ms    Calculated P Axis 54 degrees    Calculated R Axis -51 degrees    Calculated T Axis 11 degrees    Diagnosis       Normal sinus rhythm  Right bundle branch block  Left anterior fascicular block  ** Bifascicular block **  Cannot rule out Inferior infarct (masked by fascicular block?) , age   undetermined  When compared with ECG of 14-DEC-2011 10:34,  Vent. rate has increased BY  39 BPM  (RBBB and left anterior fascicular block) is now present  Minimal criteria for Inferior infarct are now present     CBC WITH AUTOMATED DIFF    Collection Time: 08/24/19 11:53 PM   Result Value Ref Range    WBC 6.7 4.1 - 11.1 K/uL    RBC 5.44 4.10 - 5.70 M/uL    HGB 15.7 12.1 - 17.0 g/dL    HCT 47.2 36.6 - 50.3 %    MCV 86.8 80.0 - 99.0 FL    MCH 28.9 26.0 - 34.0 PG    MCHC 33.3 30.0 - 36.5 g/dL    RDW 14.4 11.5 - 14.5 %    PLATELET 733 786 - 616 K/uL    MPV 10.6 8.9 - 12.9 FL    NRBC 0.0 0  WBC    ABSOLUTE NRBC 0.00 0.00 - 0.01 K/uL    NEUTROPHILS 55 32 - 75 %    LYMPHOCYTES 29 12 - 49 %    MONOCYTES 12 5 - 13 %    EOSINOPHILS 3 0 - 7 %    BASOPHILS 1 0 - 1 %    IMMATURE GRANULOCYTES 0 0.0 - 0.5 %    ABS.  NEUTROPHILS 3.7 1.8 - 8.0 K/UL    ABS. LYMPHOCYTES 1.9 0.8 - 3.5 K/UL    ABS. MONOCYTES 0.8 0.0 - 1.0 K/UL    ABS. EOSINOPHILS 0.2 0.0 - 0.4 K/UL    ABS. BASOPHILS 0.1 0.0 - 0.1 K/UL    ABS. IMM. GRANS. 0.0 0.00 - 0.04 K/UL    DF AUTOMATED     METABOLIC PANEL, COMPREHENSIVE    Collection Time: 08/24/19 11:53 PM   Result Value Ref Range    Sodium 140 136 - 145 mmol/L    Potassium 3.5 3.5 - 5.1 mmol/L    Chloride 105 97 - 108 mmol/L    CO2 25 21 - 32 mmol/L    Anion gap 10 5 - 15 mmol/L    Glucose 144 (H) 65 - 100 mg/dL    BUN 9 6 - 20 MG/DL    Creatinine 1.33 (H) 0.70 - 1.30 MG/DL    BUN/Creatinine ratio 7 (L) 12 - 20      GFR est AA >60 >60 ml/min/1.73m2    GFR est non-AA 54 (L) >60 ml/min/1.73m2    Calcium 9.1 8.5 - 10.1 MG/DL    Bilirubin, total 0.7 0.2 - 1.0 MG/DL    ALT (SGPT) 36 12 - 78 U/L    AST (SGOT) 23 15 - 37 U/L    Alk. phosphatase 98 45 - 117 U/L    Protein, total 8.3 (H) 6.4 - 8.2 g/dL    Albumin 4.1 3.5 - 5.0 g/dL    Globulin 4.2 (H) 2.0 - 4.0 g/dL    A-G Ratio 1.0 (L) 1.1 - 2.2     TROPONIN I    Collection Time: 08/24/19 11:53 PM   Result Value Ref Range    Troponin-I, Qt. <0.05 <0.05 ng/mL   TROPONIN I    Collection Time: 08/25/19  3:40 AM   Result Value Ref Range    Troponin-I, Qt. <0.05 <0.05 ng/mL       Radiologic Studies -   XR CHEST PA LAT   Final Result   IMPRESSION: No acute findings. Xr Chest Pa Lat    Result Date: 8/24/2019  IMPRESSION: No acute findings. Medical Decision Making   I am the first provider for this patient. I reviewed the vital signs, available nursing notes, past medical history, past surgical history, family history and social history. Vital Signs-Reviewed the patient's vital signs.   Patient Vitals for the past 12 hrs:   Temp Pulse Resp BP SpO2   08/25/19 0400  71 18 (!) 144/95 95 %   08/25/19 0300  70 25 (!) 141/91 97 %   08/25/19 0100  81 21 (!) 152/91 95 %   08/25/19 0030  85 20 147/90 98 %   08/24/19 2230 97.1 °F (36.2 °C) 94 18 177/89 100 %       Pulse Oximetry Analysis - 95% on ra    Cardiac Monitor:   Rate: 71bpm  Rhythm: Normal Sinus Rhythm      ED EKG interpretation:  Rhythm: normal sinus rhythm and RBBB; and regular . Rate (approx.): 96; Axis: normal; P wave: normal; QRS interval: prolonged; ST/T wave: normal; Other findings: abnormal ekg. This EKG was interpreted by DRISS Guerra MD,ED Provider. Records Reviewed: Nursing Notes, Old Medical Records and Previous electrocardiograms    Provider Notes (Medical Decision Making):   Ddx: GERD, peptic ulcer disease, ACS, musculoskeletal pain    Less likely pneumonia as patient has no fever or infectious symptoms. Given that the pain worsens with eating, suspect more likely a GI cause of his symptoms, however will rule out ACS with basic labs, troponin x2, chest x-ray. Of note, EKG was performed and shows a bifascicular block which is a change from prior EKG in our system which was performed about a year ago. If patient remains symptom-free and has negative enzymes, will recommend follow-up with cardiology. ED Course:   Initial assessment performed. The patients presenting problems have been discussed, and they are in agreement with the care plan formulated and outlined with them. I have encouraged them to ask questions as they arise throughout their visit. Troponin negative x2. Patient is remained symptom-free. Advised outpatient follow-up with cardiology. Strict return precautions discussed. Critical Care:  none    Disposition:  Discharge Note:  4:52 AM  The patient has been re-evaluated and is ready for discharge. Reviewed available results with patient. Counseled patient on diagnosis and care plan. Patient has expressed understanding, and all questions have been answered. Patient agrees with plan and agrees to follow up as recommended, or to return to the ED if their symptoms worsen. Discharge instructions have been provided and explained to the patient, along with reasons to return to the ED. PLAN:  1. Discharge Medication List as of 8/25/2019  4:52 AM        2. Follow-up Information     Follow up With Specialties Details Why Contact Info    Hernan Brothers MD Internal Medicine Schedule an appointment as soon as possible for a visit  [facility] [address]  [facility] 7 [phone]      [facility] EMERGENCY DEPT Emergency Medicine  As needed, If symptoms worsen [facility] 31    Shelbie Chris MD Cardiology, [address] [facility] Vascular Surgery, Internal Medicine   [address] [place] [address]  [phone]          Return to ED if worse     Diagnosis     Clinical Impression:   1. Chest pain, unspecified type    2. Dyspepsia    3. Right bundle branch block        This note will not be viewable in MyChart. Please note that this dictation was completed with [employer], the computer voice recognition software. Quite often unanticipated grammatical, syntax, homophones, and other interpretive errors are inadvertently transcribed by the computer software. Please disregard these errors.   Please excuse any errors that have escaped final proofreading

## 2019-08-25 NOTE — ED NOTES
Pt from home with 5/10 chest pain. Pt staes he has had pain since eating the \"now famous chicken sandwich from [email or web address]\" He states the pain has worsened over the course of the day.

## 2019-08-25 NOTE — ROUTINE PROCESS
MD has  reviewed discharge instructions with the patient and spouse. The patient and spouse verbalized understanding.]\  Pt ambulatory on discharge. RN answered questions regarding referral to cardio. Pt and wife verb understanding.

## 2020-01-15 ENCOUNTER — OFFICE VISIT (OUTPATIENT)
Dept: INTERNAL MEDICINE CLINIC | Age: 65
End: 2020-01-15

## 2020-01-15 VITALS
HEIGHT: 73 IN | WEIGHT: 276 LBS | HEART RATE: 68 BPM | SYSTOLIC BLOOD PRESSURE: 134 MMHG | TEMPERATURE: 97.7 F | DIASTOLIC BLOOD PRESSURE: 77 MMHG | BODY MASS INDEX: 36.58 KG/M2 | OXYGEN SATURATION: 96 % | RESPIRATION RATE: 18 BRPM

## 2020-01-15 DIAGNOSIS — I10 ESSENTIAL HYPERTENSION: ICD-10-CM

## 2020-01-15 DIAGNOSIS — E88.81 INSULIN RESISTANCE: ICD-10-CM

## 2020-01-15 DIAGNOSIS — Z13.39 SCREENING FOR ALCOHOLISM: ICD-10-CM

## 2020-01-15 DIAGNOSIS — N40.0 PROSTATISM: ICD-10-CM

## 2020-01-15 DIAGNOSIS — E78.1 HYPERTRIGLYCERIDEMIA: ICD-10-CM

## 2020-01-15 DIAGNOSIS — Z13.31 SCREENING FOR DEPRESSION: ICD-10-CM

## 2020-01-15 DIAGNOSIS — E66.01 SEVERE OBESITY WITH BODY MASS INDEX (BMI) OF 35.0 TO 39.9 WITH SERIOUS COMORBIDITY (HCC): ICD-10-CM

## 2020-01-15 DIAGNOSIS — E66.9 OBESITY (BMI 30-39.9): ICD-10-CM

## 2020-01-15 DIAGNOSIS — Z00.00 MEDICARE ANNUAL WELLNESS VISIT, SUBSEQUENT: Primary | ICD-10-CM

## 2020-01-15 DIAGNOSIS — E11.21 TYPE 2 DIABETES WITH NEPHROPATHY (HCC): ICD-10-CM

## 2020-01-15 RX ORDER — AMLODIPINE BESYLATE 5 MG/1
TABLET ORAL
Qty: 90 TAB | Refills: 3 | Status: SHIPPED | OUTPATIENT
Start: 2020-01-15 | End: 2021-01-13 | Stop reason: SDUPTHER

## 2020-01-15 RX ORDER — FINASTERIDE 5 MG/1
TABLET, FILM COATED ORAL
Qty: 90 TAB | Refills: 4 | Status: SHIPPED | OUTPATIENT
Start: 2020-01-15 | End: 2021-01-13 | Stop reason: SDUPTHER

## 2020-01-15 RX ORDER — EMOLLIENT BASE
CREAM (GRAM) TOPICAL AS NEEDED
Qty: 120 G | Refills: 11 | Status: SHIPPED | OUTPATIENT
Start: 2020-01-15 | End: 2021-01-13

## 2020-01-15 RX ORDER — TAMSULOSIN HYDROCHLORIDE 0.4 MG/1
0.4 CAPSULE ORAL DAILY
Qty: 90 CAP | Refills: 3 | Status: SHIPPED | OUTPATIENT
Start: 2020-01-15 | End: 2021-01-13 | Stop reason: SDUPTHER

## 2020-01-15 RX ORDER — ATENOLOL 50 MG/1
50 TABLET ORAL
Qty: 90 TAB | Refills: 3 | Status: SHIPPED | OUTPATIENT
Start: 2020-01-15 | End: 2021-01-13 | Stop reason: SDUPTHER

## 2020-01-15 NOTE — PATIENT INSTRUCTIONS
Medicare Wellness Visit, Male    The best way to live healthy is to have a lifestyle where you eat a well-balanced diet, exercise regularly, limit alcohol use, and quit all forms of tobacco/nicotine, if applicable. Regular preventive services are another way to keep healthy. Preventive services (vaccines, screening tests, monitoring & exams) can help personalize your care plan, which helps you manage your own care. Screening tests can find health problems at the earliest stages, when they are easiest to treat. [facility] follows the current, evidence-based guidelines published by the [facility] ([facility]) when recommending preventive services for our patients. Because we follow these guidelines, sometimes recommendations change over time as research supports it. (For example, a prostate screening blood test is no longer routinely recommended for men with no symptoms). Of course, you and your doctor may decide to screen more often for some diseases, based on your risk and co-morbidities (chronic disease you are already diagnosed with). Preventive services for you include:  - Medicare offers their members a free annual wellness visit, which is time for you and your primary care provider to discuss and plan for your preventive service needs. Take advantage of this benefit every year!  -All adults over age 72 should receive the recommended pneumonia vaccines. Current USPSTF guidelines recommend a series of two vaccines for the best pneumonia protection.   -All adults should have a flu vaccine yearly and tetanus vaccine every 10 years.  -All adults age 48 and older should receive the shingles vaccines (series of two vaccines).        -All adults age 38-68 who are overweight should have a diabetes screening test once every three years.   -Other screening tests & preventive services for persons with diabetes include: an eye exam to screen for diabetic retinopathy, a kidney function test, a foot exam, and stricter control over your cholesterol.   -Cardiovascular screening for adults with routine risk involves an electrocardiogram (ECG) at intervals determined by the provider.   -Colorectal cancer screening should be done for adults age 54-65 with no increased risk factors for colorectal cancer. There are a number of acceptable methods of screening for this type of cancer. Each test has its own benefits and drawbacks. Discuss with your provider what is most appropriate for you during your annual wellness visit. The different tests include: colonoscopy (considered the best screening method), a fecal occult blood test, a fecal DNA test, and sigmoidoscopy.  -All adults born between [facility] should be screened once for Hepatitis C.  -An Abdominal Aortic Aneurysm (AAA) Screening is recommended for men age 73-68 who has ever smoked in their lifetime.      Here is a list of your current Health Maintenance items (your personalized list of preventive services) with a due date:  Health Maintenance Due   Topic Date Due    Diabetic Foot Care  06/21/1965    Eye Exam  06[birth year]21/1965    Glaucoma Screening   06/21/2018    Annual Well Visit  08/01/2018    Albumin Urine Test  08/01/2019    Cholesterol Test   08/01/2019    Hemoglobin A1C    01/17/2020    Colonoscopy  05/31/2020

## 2020-01-15 NOTE — PROGRESS NOTES
1. Have you been to the ER, urgent care clinic since your last visit? Hospitalized since your last visit? No    2. Have you seen or consulted any other health care providers outside of the [address] [place] [address] since your last visit? Include any pap smears or colon screening. No     Wants to discuss night sweats  This is the Subsequent Medicare Annual Wellness Exam, performed 12 months or more after the Initial AWV or the last Subsequent AWV    I have reviewed the patient's medical history in detail and updated the computerized patient record. History     Patient Active Problem List   Diagnosis Code    HTN (hypertension) I10    Hypertriglyceridemia E78.1    Obstructive sleep apnea G47.33    Obesity (BMI 30-39. 9) E66.9    Prostatism N40.0    Gout M10.9    Insulin resistance E88.81    Severe obesity with body mass index (BMI) of 35.0 to 39.9 with serious comorbidity (HCC) E66.01    Type 2 diabetes with nephropathy (HCC) E11.21    Hamstring muscle strain S76.319A    Trigger thumb of right hand M65.311     Past Medical History:   Diagnosis Date    BPH (benign prostatic hyperplasia)     Gout     Hamstring muscle strain 03/06/2019    Hypercholesterolemia     Hypertension     IGT (impaired glucose tolerance) 08/01/2018    Prostatism     S/P colonoscopic polypectomy 05/31/2017    colon polyp Corie Garcia MD    Trigger thumb of right hand 07/17/2019      Past Surgical History:   Procedure Laterality Date    COLONOSCOPY N/A 5/31/2017    COLONOSCOPY performed [facility]    HX COLONOSCOPY      HX OTHER SURGICAL      ingrown toenail repair     Current Outpatient Medications   Medication Sig Dispense Refill    triamcinolone acetonide (KENALOG) 0.1 % topical cream Apply  to affected area two (2) times a day. 45 g 11    tamsulosin (FLOMAX) 0.4 mg capsule Take 1 Cap by mouth daily.  90 Cap 3    finasteride (PROSCAR) 5 mg tablet TAKE 1 TABLET BY MOUTH EVERY DAY 90 Tab 4    atenolol (TENORMIN) 50 mg tablet Take 1 Tab by mouth daily. 90 Tab 3    amLODIPine (NORVASC) 5 mg tablet TAKE 1 TABLET DAILY 90 Tab 3    colchicine 0.6 mg tablet Take 1 Tab by mouth daily. 90 Tab 3    inulin (FIBER CHOICE, INULIN,) 1.5 gram chew Take 2 Tabs by mouth daily. 180 Tab 11    cholecalciferol, vitamin D3, (VITAMIN D3) 2,000 unit tab Take 1 Tab by mouth daily. 90 Tab 11    ascorbic acid, vitamin C, (VITAMIN C) 500 mg tablet Take 2 Tabs by mouth daily. 90 Tab 11    emollient topical cream Apply  to affected area as needed. 453 g 11     No Known Allergies    Family History   Problem Relation Age of Onset   [address] [facility] Cancer Mother     Hypertension Mother     Cancer Father      Social History     Tobacco Use    Smoking status: Never Smoker    Smokeless tobacco: Never Used   Substance Use Topics    Alcohol use: No       Depression Risk Factor Screening:     3 most recent PHQ Screens 1/15/2020   Little interest or pleasure in doing things Not at all   Feeling down, depressed, irritable, or hopeless Not at all   Total Score PHQ 2 0       Alcohol Risk Factor Screening (MALE > 65): Do you average more 1 drink per night or more than 7 drinks a week: No    In the past three months have you have had more than 4 drinks containing alcohol on one occasion: No      Functional Ability and Level of Safety:   Hearing: Hearing is good. Activities of Daily Living: The home contains: no safety equipment. Patient does total self care    Ambulation: with no difficulty    Fall Risk:  Fall Risk Assessment, last 12 mths 1/15/2020   Able to walk? Yes   Fall in past 12 months?  No       Abuse Screen:  Patient is not abused    Cognitive Screening   Has your family/caregiver stated any concerns about your memory: no  Cognitive Screening: Normal - MMSE (Mini Mental Status Exam)    Patient Care Team   Patient Care Team:  Casey Del Rosario MD as PCP - General (Internal Medicine)  Casey Del Rosario MD as PCP - [facility] Empaneled Provider    Assessment/Plan   Education and counseling provided:  Are appropriate based on today's review and evaluation        Health Maintenance Due   Topic Date Due    FOOT EXAM Q1  06[birth year]21/1965    EYE EXAM RETINAL OR DILATED  06/21/1963    GLAUCOMA SCREENING Q2Y  06/21/2018    MEDICARE YEARLY EXAM  08/01/2018    MICROALBUMIN Q1  08/01/2019    LIPID PANEL Q1  08/01/2019    HEMOGLOBIN A1C Q6M  01/17/2020    COLONOSCOPY  05/31/2020

## 2020-01-15 NOTE — PROGRESS NOTES
SPORTS MEDICINE AND PRIMARY CARE  Marge Rios MD, [address] [address]  Phone:  [phone]  Fax: [phone]      Chief Complaint   Patient presents with    Annual Wellness Visit         SUBECTIVE:    Edwin Avelar is a 75 y.o. male Patient returns today after a visit to the ER on 08/24/19 for indigestion, chest discomfort. He has a known history of type 2 diabetes with nephropathy, obesity, prostatism, insulin resistance, primary hypertension, hypertriglyceridemia, and is seen for evaluation. Current Outpatient Medications   Medication Sig Dispense Refill    emollient topical cream Apply  to affected area as needed for Dry Skin. 120 g 11    tamsulosin (FLOMAX) 0.4 mg capsule Take 1 Cap by mouth daily. 90 Cap 3    finasteride (PROSCAR) 5 mg tablet TAKE 1 TABLET BY MOUTH EVERY DAY 90 Tab 4    atenoloL (TENORMIN) 50 mg tablet Take 1 Tab by mouth nightly. 90 Tab 3    amLODIPine (NORVASC) 5 mg tablet TAKE 1 TABLET DAILY 90 Tab 3    triamcinolone acetonide (KENALOG) 0.1 % topical cream Apply  to affected area two (2) times a day. 45 g 11    colchicine 0.6 mg tablet Take 1 Tab by mouth daily. 90 Tab 3    inulin (FIBER CHOICE, INULIN,) 1.5 gram chew Take 2 Tabs by mouth daily. 180 Tab 11    cholecalciferol, vitamin D3, (VITAMIN D3) 2,000 unit tab Take 1 Tab by mouth daily. 90 Tab 11    ascorbic acid, vitamin C, (VITAMIN C) 500 mg tablet Take 2 Tabs by mouth daily. 90 Tab 11    emollient topical cream Apply  to affected area as needed.  453 g 11     Past Medical History:   Diagnosis Date    BPH (benign prostatic hyperplasia)     Gout     Hamstring muscle strain 03/06/2019    Hypercholesterolemia     Hypertension     IGT (impaired glucose tolerance) 08/01/2018    Prostatism     S/P colonoscopic polypectomy 05/31/2017    colon polyp Mario MD [place]    Trigger thumb of right hand 07/17/2019     Past Surgical History:   Procedure Laterality Date    COLONOSCOPY N/A 2017    COLONOSCOPY performed by Florie Lanes, [facility] at [facility]    HX COLONOSCOPY      HX OTHER SURGICAL      ingrown toenail repair     No Known Allergies    REVIEW OF SYSTEMS:   No chest pain, no shortness of breath. Social History     Socioeconomic History    Marital status:      Spouse name: Not on file    Number of children: Not on file    Years of education: Not on file    Highest education level: Not on file   Tobacco Use    Smoking status: Never Smoker    Smokeless tobacco: Never Used   Substance and Sexual Activity    Alcohol use: No    Drug use: No    Sexual activity: Yes     Partners: Female   Social History Narrative    Habits:  He is a lifetime nonsmoker, non drinker, non drug abuser.         Social History:  The patient is , lives with his wife. 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 No grandchildren yet. He is a member of [employer].         Family History:  Father  76 of lung cancer. He was a cigarette smoker. Mother  68 of cancer. He has ten siblings. One brother , which he thinks he was murdered, but the official cause was he jumped off a bridge and they think he was also doing drugs   r  Family History   Problem Relation Age of Onset    Cancer Mother     Hypertension Mother     Cancer Father        OBJECTIVE:  Visit Vitals  /77   Pulse 68   Temp 97.7 °F (36.5 °C) (Oral)   Resp 18   Ht 6' 1\" (1.854 m)   Wt 276 lb (125.2 kg)   SpO2 96%   BMI 36.41 kg/m²     ENT: perrla,  eom intact  NECK: supple. Thyroid normal  CHEST: clear to ascultation and percussion   HEART: regular rate and rhythm  ABD: soft, bowel sounds active  EXTREMITIES: no edema, pulse 1+     No visits with results within 3 Month(s) from this visit.    Latest known visit with results is:   Admission on 2019, Discharged on 2019   Component Date Value Ref Range Status    Ventricular Rate 08/24/2019 96  BPM Final    Atrial Rate 08/24/2019 96  BPM Final    P-R Interval 08/24/2019 204  ms Final    QRS Duration 08/24/2019 162  ms Final    Q-T Interval 08/24/2019 426  ms Final    QTC Calculation (Bezet) 08/24/2019 538  ms Final    Calculated P Axis 08/24/2019 54  degrees Final    Calculated R Axis 08/24/2019 -51  degrees Final    Calculated T Axis 08/24/2019 11  degrees Final    Diagnosis 08/24/2019    Final                    Value:Normal sinus rhythm  Right bundle branch block  Left anterior fascicular block  ** Bifascicular block **  Cannot rule out Inferior infarct (masked by fascicular block?) , age   undetermined  When compared with ECG of 14-DEC-2011 10:34,  Vent. rate has increased BY  39 BPM  (RBBB and left anterior fascicular block) is now present  Minimal criteria for Inferior infarct are now present  Confirmed by Chris Kingston ([id number]) on 8/25/2019 11:19:11 PM      WBC 08/24/2019 6.7  4.1 - 11.1 K/uL Final    RBC 08/24/2019 5.44  4.10 - 5.70 M/uL Final    HGB 08/24/2019 15.7  12.1 - 17.0 g/dL Final    HCT 08/24/2019 47.2  36.6 - 50.3 % Final    MCV 08/24/2019 86.8  80.0 - 99.0 FL Final    MCH 08/24/2019 28.9  26.0 - 34.0 PG Final    MCHC 08/24/2019 33.3  30.0 - 36.5 g/dL Final    RDW 08/24/2019 14.4  11.5 - 14.5 % Final    PLATELET 86/95/4970 981  150 - 400 K/uL Final    MPV 08/24/2019 10.6  8.9 - 12.9 FL Final    NRBC 08/24/2019 0.0  0  WBC Final    ABSOLUTE NRBC 08/24/2019 0.00  0.00 - 0.01 K/uL Final    NEUTROPHILS 08/24/2019 55  32 - 75 % Final    LYMPHOCYTES 08/24/2019 29  12 - 49 % Final    MONOCYTES 08/24/2019 12  5 - 13 % Final    EOSINOPHILS 08/24/2019 3  0 - 7 % Final    BASOPHILS 08/24/2019 1  0 - 1 % Final    IMMATURE GRANULOCYTES 08/24/2019 0  0.0 - 0.5 % Final    ABS. NEUTROPHILS 08/24/2019 3.7  1.8 - 8.0 K/UL Final    ABS. LYMPHOCYTES 08/24/2019 1.9  0.8 - 3.5 K/UL Final    ABS.  MONOCYTES 08/24/2019 0.8  0.0 - 1.0 K/UL Final    ABS. EOSINOPHILS 08/24/2019 0.2  0.0 - 0.4 K/UL Final    ABS. BASOPHILS 08/24/2019 0.1  0.0 - 0.1 K/UL Final    ABS. IMM. GRANS. 08/24/2019 0.0  0.00 - 0.04 K/UL Final    DF 08/24/2019 AUTOMATED    Final    Sodium 08/24/2019 140  136 - 145 mmol/L Final    Potassium 08/24/2019 3.5  3.5 - 5.1 mmol/L Final    Chloride 08/24/2019 105  97 - 108 mmol/L Final    CO2 08/24/2019 25  21 - 32 mmol/L Final    Anion gap 08/24/2019 10  5 - 15 mmol/L Final    Glucose 08/24/2019 144* 65 - 100 mg/dL Final    BUN 08/24/2019 9  6 - 20 MG/DL Final    Creatinine 08/24/2019 1.33* 0.70 - 1.30 MG/DL Final    BUN/Creatinine ratio 08/24/2019 7* 12 - 20   Final    GFR est AA 08/24/2019 >60  >60 ml/min/1.73m2 Final    GFR est non-AA 08/24/2019 54* >60 ml/min/1.73m2 Final    Comment: Estimated GFR is calculated using the IDMS-traceable Modification of Diet in Renal Disease (MDRD) Study equation, reported for both  Americans (GFRAA) and non- Americans (GFRNA), and normalized to 1.73m2 body surface area. The physician must decide which value applies to the patient. The MDRD study equation should only be used in individuals age 25 or older. It has not been validated for the following: pregnant women, patients with serious comorbid conditions, or on certain medications, or persons with extremes of body size, muscle mass, or nutritional status.  Calcium 08/24/2019 9.1  8.5 - 10.1 MG/DL Final    Bilirubin, total 08/24/2019 0.7  0.2 - 1.0 MG/DL Final    ALT (SGPT) 08/24/2019 36  12 - 78 U/L Final    AST (SGOT) 08/24/2019 23  15 - 37 U/L Final    Alk.  phosphatase 08/24/2019 98  45 - 117 U/L Final    Protein, total 08/24/2019 8.3* 6.4 - 8.2 g/dL Final    Albumin 08/24/2019 4.1  3.5 - 5.0 g/dL Final    Globulin 08/24/2019 4.2* 2.0 - 4.0 g/dL Final    A-G Ratio 08/24/2019 1.0* 1.1 - 2.2   Final    Troponin-I, Qt. 08/24/2019 <0.05  <0.05 ng/mL Final    Comment: The presence of detectable troponin above the reference range indicates myocardial injury which may be due to ischemia, myocarditis, trauma, etc.  Clinical correlation is necessary to establish the significance of this finding. Sequential testing is recommended to determine if the typical rise and fall of cTnI is demonstrated. Note:  Cardiac troponin I has a relatively long half life and may be present well after the CK MB has returned to baseline. The reference range is based on the 99th percentile of the referent population.  Troponin-I, Qt. 08/25/2019 <0.05  <0.05 ng/mL Final   Patient's medical status is stable. He has known history of diabetes with nephropathy, for which we will check hemoglobin A1c today. Obesity remains a concern and since we last saw him, he is still in the obese category. We encourage a heart healthy, weight reducing diet. For his prostatism he continues to take Finasteride, as well as Tamsulosin. We will renew the medications for him. BP control is at goal.    He will be back to see me in three to four months, sooner if needed. ASSESSMENT:  1. Medicare annual wellness visit, subsequent    2. Screening for alcoholism    3. Screening for depression    4. Type 2 diabetes with nephropathy ([facility] [address]    5. Severe obesity with body mass index (BMI) of 35.0 to 39.9 with serious comorbidity ([facility] [address]    6. Prostatism    7. Obesity (BMI 30-39.9)    8. Insulin resistance    9. Essential hypertension    10. Hypertriglyceridemia      **  I have discussed the diagnosis with the patient and the intended plan as seen in the  orders above. The patient understands and agees with the plan. The patient has   received an after visit summary and questions were answered concerning  future plans  Patient labs and/or xrays were reviewed  Past records were reviewed.     PLAN:  .  Orders Placed This Encounter    Depression Screen Annual    HEMOGLOBIN A1C WITH EAG    MICROALBUMIN, UR, RAND W/ MICROALB/CREAT RATIO    LIPID PANEL    RENAL FUNCTION PANEL    emollient topical cream    tamsulosin (FLOMAX) 0.4 mg capsule    finasteride (PROSCAR) 5 mg tablet    atenoloL (TENORMIN) 50 mg tablet    amLODIPine (NORVASC) 5 mg tablet                     ATTENTION:   This medical record was transcribed using an electronic medical records system. Although proofread, it may and can contain electronic and spelling errors. Other human spelling and other errors may be present. Corrections may be executed at a later time. Please feel free to contact us for any clarifications as needed.

## 2020-01-17 LAB
ALBUMIN SERPL-MCNC: 4.2 G/DL (ref 3.6–4.8)
BUN SERPL-MCNC: 9 MG/DL (ref 8–27)
BUN/CREAT SERPL: 7 (ref 10–24)
CALCIUM SERPL-MCNC: 9.1 MG/DL (ref 8.6–10.2)
CHLORIDE SERPL-SCNC: 102 MMOL/L (ref 96–106)
CHOLEST SERPL-MCNC: 148 MG/DL (ref 100–199)
CO2 SERPL-SCNC: 21 MMOL/L (ref 20–29)
CREAT SERPL-MCNC: 1.32 MG/DL (ref 0.76–1.27)
CREAT UR-MCNC: NORMAL MG/DL
EST. AVERAGE GLUCOSE BLD GHB EST-MCNC: 140 MG/DL
GLUCOSE SERPL-MCNC: 96 MG/DL (ref 65–99)
HBA1C MFR BLD: 6.5 % (ref 4.8–5.6)
HDLC SERPL-MCNC: 32 MG/DL
LDLC SERPL CALC-MCNC: ABNORMAL MG/DL (ref 0–99)
MICROALBUMIN UR-MCNC: NORMAL
PHOSPHATE SERPL-MCNC: 2.6 MG/DL (ref 2.8–4.1)
POTASSIUM SERPL-SCNC: 4.2 MMOL/L (ref 3.5–5.2)
SODIUM SERPL-SCNC: 140 MMOL/L (ref 134–144)
TRIGL SERPL-MCNC: 536 MG/DL (ref 0–149)
VLDLC SERPL CALC-MCNC: ABNORMAL MG/DL (ref 5–40)

## 2020-01-20 RX ORDER — ATORVASTATIN CALCIUM 20 MG/1
20 TABLET, FILM COATED ORAL DAILY
Qty: 30 TAB | Refills: 11 | Status: SHIPPED | OUTPATIENT
Start: 2020-01-20 | End: 2020-09-29 | Stop reason: CLARIF

## 2020-01-20 RX ORDER — METFORMIN HYDROCHLORIDE 500 MG/1
500 TABLET, EXTENDED RELEASE ORAL
Qty: 30 TAB | Refills: 11 | Status: SHIPPED | OUTPATIENT
Start: 2020-01-20 | End: 2021-01-13

## 2020-05-06 ENCOUNTER — VIRTUAL VISIT (OUTPATIENT)
Dept: INTERNAL MEDICINE CLINIC | Age: 65
End: 2020-05-06

## 2020-05-06 DIAGNOSIS — N40.0 PROSTATISM: ICD-10-CM

## 2020-05-06 DIAGNOSIS — Z98.890 S/P COLONOSCOPIC POLYPECTOMY: ICD-10-CM

## 2020-05-06 DIAGNOSIS — E66.01 SEVERE OBESITY WITH BODY MASS INDEX (BMI) OF 35.0 TO 39.9 WITH SERIOUS COMORBIDITY (HCC): ICD-10-CM

## 2020-05-06 DIAGNOSIS — E78.1 HYPERTRIGLYCERIDEMIA: ICD-10-CM

## 2020-05-06 DIAGNOSIS — I10 ESSENTIAL HYPERTENSION: ICD-10-CM

## 2020-05-06 DIAGNOSIS — G47.33 OBSTRUCTIVE SLEEP APNEA: ICD-10-CM

## 2020-05-06 DIAGNOSIS — E11.21 TYPE 2 DIABETES WITH NEPHROPATHY (HCC): Primary | ICD-10-CM

## 2020-05-06 NOTE — ASSESSMENT & PLAN NOTE
Stable, based on history, physical exam and review of pertinent labs, studies and medications; meds reconciled; continue current treatment plan. Key Antihyperglycemic Medications             metFORMIN ER (GLUCOPHAGE XR) 500 mg tablet (Taking) Take 1 Tab by mouth daily (with dinner). Other Key Diabetic Medications             atorvastatin (LIPITOR) 20 mg tablet (Taking) Take 1 Tab by mouth daily.         Lab Results   Component Value Date/Time    Hemoglobin A1c 6.5 01/15/2020 05:18 PM    Glucose 96 01/15/2020 05:18 PM    Creatinine 1.32 01/15/2020 05:18 PM    Cholesterol, total 148 01/15/2020 05:18 PM    HDL Cholesterol 32 01/15/2020 05:18 PM    LDL, calculated Comment 01/15/2020 05:18 PM    Triglyceride 536 01/15/2020 05:18 PM     Diabetic Foot and Eye Exam HM Status   Topic Date Due    Eye Exam  06/21/1965    Diabetic Foot Care  01/15/2021

## 2020-05-06 NOTE — ASSESSMENT & PLAN NOTE
Stable, based on history, physical exam and review of pertinent labs, studies and medications; meds reconciled; continue current treatment plan. Key Obesity Meds             metFORMIN ER (GLUCOPHAGE XR) 500 mg tablet (Taking) Take 1 Tab by mouth daily (with dinner).         Lab Results   Component Value Date/Time    Hemoglobin A1c 6.5 01/15/2020 05:18 PM    Glucose 96 01/15/2020 05:18 PM    Cholesterol, total 148 01/15/2020 05:18 PM    HDL Cholesterol 32 01/15/2020 05:18 PM    LDL, calculated Comment 01/15/2020 05:18 PM    Triglyceride 536 01/15/2020 05:18 PM    Sodium 140 01/15/2020 05:18 PM    Potassium 4.2 01/15/2020 05:18 PM    ALT (SGPT) 36 08/24/2019 11:53 PM    AST (SGOT) 23 08/24/2019 11:53 PM

## 2020-05-06 NOTE — PROGRESS NOTES
1. Have you been to the ER, urgent care clinic since your last visit? Hospitalized since your last visit? No    2. Have you seen or consulted any other health care providers outside of the [address] [place] [address] since your last visit? Include any pap smears or colon screening.  No     Wants to discuss colonoscopy

## 2020-05-06 NOTE — PROGRESS NOTES
Sheryl Marinelli is a 75 y.o. male who was seen by synchronous (real-time) audio-video technology on 5/6/2020. Consent: Sheryl Shaw, who was seen by synchronous (real-time) audio-video technology, and/or his healthcare decision maker, is aware that this patient-initiated, Telehealth encounter on 5/6/2020 is a billable service, with coverage as determined by his insurance carrier. He is aware that he may receive a bill and has provided verbal consent to proceed: Yes. Assessment & Plan:   Diagnoses and all orders for this visit:    1. Obstructive sleep apnea    2. Type 2 diabetes with nephropathy (HCC)  His blood sugar control is excellent with a hemoglobin A1c of 6.5 on January 15, 2020 on metformin 500 mg daily. In 2 to 3 months when the coronavirus via crisis is resolved we will repeat the metabolic status and hemoglobin A1c  Assessment & Plan:  Stable, based on history, physical exam and review of pertinent labs, studies and medications; meds reconciled; continue current treatment plan. Key Antihyperglycemic Medications             metFORMIN ER (GLUCOPHAGE XR) 500 mg tablet (Taking) Take 1 Tab by mouth daily (with dinner). Other Key Diabetic Medications             atorvastatin (LIPITOR) 20 mg tablet (Taking) Take 1 Tab by mouth daily. Lab Results   Component Value Date/Time    Hemoglobin A1c 6.5 01/15/2020 05:18 PM    Glucose 96 01/15/2020 05:18 PM    Creatinine 1.32 01/15/2020 05:18 PM    Cholesterol, total 148 01/15/2020 05:18 PM    HDL Cholesterol 32 01/15/2020 05:18 PM    LDL, calculated Comment 01/15/2020 05:18 PM    Triglyceride 536 01/15/2020 05:18 PM     Diabetic Foot and Eye Exam HM Status   Topic Date Due    Eye Exam  06/21/1965    Diabetic Foot Care  01/15/2021         3.  Severe obesity with body mass index (BMI) of 35.0 to 39.9 with serious comorbidity ([address]  We will continue to encourage a heart healthy diabetic weight reducing diet and physical activity for 30 minutes 5 days a week. He did some bicycling the other day we cautioned him because the 's that do not pay attention. Assessment & Plan:  Stable, based on history, physical exam and review of pertinent labs, studies and medications; meds reconciled; continue current treatment plan. Key Obesity Meds             metFORMIN ER (GLUCOPHAGE XR) 500 mg tablet (Taking) Take 1 Tab by mouth daily (with dinner). Lab Results   Component Value Date/Time    Hemoglobin A1c 6.5 01/15/2020 05:18 PM    Glucose 96 01/15/2020 05:18 PM    Cholesterol, total 148 01/15/2020 05:18 PM    HDL Cholesterol 32 01/15/2020 05:18 PM    LDL, calculated Comment 01/15/2020 05:18 PM    Triglyceride 536 01/15/2020 05:18 PM    Sodium 140 01/15/2020 05:18 PM    Potassium 4.2 01/15/2020 05:18 PM    ALT (SGPT) 36 08/24/2019 11:53 PM    AST (SGOT) 23 08/24/2019 11:53 PM             4. Essential hypertension  His blood pressure is managed with atenolol 50 mg nightly, amlodipine 5 mg every morning and a heart healthy diet. 5. Hypertriglyceridemia  His lipids are managed with atorvastatin 20 mg daily with his last triglycerides of 536, cholesterol 148, HDL 32. We will repeat the lipid panel if the triglycerides remain a concern will place him on omega-3 fatty acids. 6. Prostatism currently asymptomatic. We will see him in about 2 to 3 months at that time we will repeat his metabolic status. In the interim we will make an appointment for him to see his gastroenterologist.            Subjective:   Antoni Mtz. is a 75 y.o. male who was seen for Hypertension  Patient seen today as a virtual, video, telehealth visit. He has a known history of diabetes mellitus type 2, obesity, obstructive sleep apnea, primary hypertension, triglyceridemia, prostatism. He voices no new complaints but thinks is about time for him to have another colonoscopy. His last colonoscopy was performed on May 31, 2017 with removal of a tubular adenoma. Endoscopist recommended a repeat in 3 years. Patient seen for evaluation. Prior to Admission medications    Medication Sig Start Date End Date Taking? Authorizing Provider   metFORMIN ER (GLUCOPHAGE XR) 500 mg tablet Take 1 Tab by mouth daily (with dinner). 1/20/20  Yes Puma Rucker MD   atorvastatin (LIPITOR) 20 mg tablet Take 1 Tab by mouth daily. 1/20/20  Yes Puma Rucker MD   emollient topical cream Apply  to affected area as needed for Dry Skin. 1/15/20  Yes Puma Rucker MD   tamsulosin (FLOMAX) 0.4 mg capsule Take 1 Cap by mouth daily. 1/15/20  Yes Puma Rucker MD   finasteride (PROSCAR) 5 mg tablet TAKE 1 TABLET BY MOUTH EVERY DAY 1/15/20  Yes Puma Rucker MD   atenoloL (TENORMIN) 50 mg tablet Take 1 Tab by mouth nightly. 1/15/20  Yes Puma Rucker MD   amLODIPine (NORVASC) 5 mg tablet TAKE 1 TABLET DAILY 1/15/20  Yes Puma Rucker MD   triamcinolone acetonide (KENALOG) 0.1 % topical cream Apply  to affected area two (2) times a day. 3/6/19  Yes Puma Rucker MD   colchicine 0.6 mg tablet Take 1 Tab by mouth daily. 8/1/18  Yes Puma Rucker MD   inulin (FIBER CHOICE, INULIN,) 1.5 gram chew Take 2 Tabs by mouth daily. 8/1/18  Yes Puma Rucker MD   cholecalciferol, vitamin D3, (VITAMIN D3) 2,000 unit tab Take 1 Tab by mouth daily. 8/1/18  Yes Puma Rucker MD   ascorbic acid, vitamin C, (VITAMIN C) 500 mg tablet Take 2 Tabs by mouth daily. 8/1/18  Yes Puma Rucker MD   emollient topical cream Apply  to affected area as needed.  8/1/18  Yes Puma Rucker MD     No Known Allergies    REVIEW OF SYSTEMS as noted below except that noted in subjective:  General: negative for - chills or fever  ENT: negative for - headaches, nasal congestion or tinnitus  Respiratory: negative for - cough, hemoptysis, shortness of breath or wheezing  Cardiovascular : negative for - chest pain, edema, palpitations or shortness of breath  Gastrointestinal: negative for - abdominal pain, blood in stools, heartburn or nausea/vomiting  Genito-Urinary: no dysuria, trouble voiding, or hematuria  Musculoskeletal: negative for - gait disturbance, joint pain, joint stiffness or joint swelling  Neurological: no TIA or stroke symptoms  Hematologic: no bruises, no bleeding, no swollen glands  Integument: no lumps, mole changes, nail changes or rash  Endocrine:no malaise/lethargy or unexpected weight changes      Patient Active Problem List   Diagnosis Code    HTN (hypertension) I10    Hypertriglyceridemia E78.1    Obstructive sleep apnea G47.33    Prostatism N40.0    Gout M10.9    Insulin resistance E88.81    Severe obesity with body mass index (BMI) of 35.0 to 39.9 with serious comorbidity (HCC) E66.01    Type 2 diabetes with nephropathy ([facility]) E11.21    Hamstring muscle strain S76.319A    Trigger thumb of right hand M65.311    S/P colonoscopic polypectomy Z98.890     Patient Active Problem List    Diagnosis Date Noted    Trigger thumb of right hand 07/17/2019    Hamstring muscle strain 03/06/2019    Type 2 diabetes with nephropathy ([facility] 11/28/2018    Severe obesity with body mass index (BMI) of 35.0 to 39.9 with serious comorbidity ([facility] 08/01/2018    S/P colonoscopic polypectomy 05/31/2017    Obstructive sleep apnea 04/05/2015    Prostatism 04/05/2015    Gout 04/05/2015    Insulin resistance 04/05/2015    HTN (hypertension) 12/13/2011    Hypertriglyceridemia 12/13/2011     Current Outpatient Medications   Medication Sig Dispense Refill    metFORMIN ER (GLUCOPHAGE XR) 500 mg tablet Take 1 Tab by mouth daily (with dinner). 30 Tab 11    atorvastatin (LIPITOR) 20 mg tablet Take 1 Tab by mouth daily. 30 Tab 11    emollient topical cream Apply  to affected area as needed for Dry Skin. 120 g 11    tamsulosin (FLOMAX) 0.4 mg capsule Take 1 Cap by mouth daily.  90 Cap 3    finasteride (PROSCAR) 5 mg tablet TAKE 1 TABLET BY MOUTH EVERY DAY 90 Tab 4    atenoloL (TENORMIN) 50 mg tablet Take 1 Tab by mouth nightly. 90 Tab 3    amLODIPine (NORVASC) 5 mg tablet TAKE 1 TABLET DAILY 90 Tab 3    triamcinolone acetonide (KENALOG) 0.1 % topical cream Apply  to affected area two (2) times a day. 45 g 11    colchicine 0.6 mg tablet Take 1 Tab by mouth daily. 90 Tab 3    inulin (FIBER CHOICE, INULIN,) 1.5 gram chew Take 2 Tabs by mouth daily. 180 Tab 11    cholecalciferol, vitamin D3, (VITAMIN D3) 2,000 unit tab Take 1 Tab by mouth daily. 90 Tab 11    ascorbic acid, vitamin C, (VITAMIN C) 500 mg tablet Take 2 Tabs by mouth daily. 90 Tab 11    emollient topical cream Apply  to affected area as needed. 453 g 11     No Known Allergies  Past Medical History:   Diagnosis Date    BPH (benign prostatic hyperplasia)     Gout     Hamstring muscle strain 03/06/2019    Hypercholesterolemia     Hypertension     IGT (impaired glucose tolerance) 08/01/2018    Prostatism     S/P colonoscopic polypectomy 05/31/2017    colon polyp Nelson Paulino MD    Trigger thumb of right hand 07/17/2019     Past Surgical History:   Procedure Laterality Date    COLONOSCOPY N/A 5/31/2017    COL[facility] at [facility]COPY    HX COLONOSCOPY      HX OTHER SURGICAL      ingrown toenail repair     Family History   Problem Relation Age of Onset   Zane Self Cancer Mother     Hypertension Mother     Cancer Father      Social History     Tobacco Use    Smoking status: Never Smoker    Smokeless tobacco: Never Used   Substance Use Topics    Alcohol use: No       ROS    Objective:   Vital Signs: (As obtained by patient/caregiver at home)  There were no vitals taken for this visit.      [INSTRUCTIONS:  \"[x]\" Indicates a positive item  \"[]\" Indicates a negative item  -- DELETE ALL ITEMS NOT EXAMINED]    Constitutional: [x] Appears well-developed and well-nourished [x] No apparent distress      [] Abnormal -     Mental status: [x] Alert and awake  [x] Oriented to person/place/time [x] Able to follow commands    [] Abnormal -     Eyes:   EOM    [x]  Normal    [] Abnormal -   Sclera  [x]  Normal    [] Abnormal -          Discharge [x]  None visible   [] Abnormal -     HENT: [x] Normocephalic, atraumatic  [] Abnormal -   [x] Mouth/Throat: Mucous membranes are moist    External Ears [x] Normal  [] Abnormal -    Neck: [x] No visualized mass [] Abnormal -     Pulmonary/Chest: [x] Respiratory effort normal   [x] No visualized signs of difficulty breathing or respiratory distress        [] Abnormal -      Musculoskeletal:   [x] Normal gait with no signs of ataxia         [x] Normal range of motion of neck        [] Abnormal -     Neurological:        [x] No Facial Asymmetry (Cranial nerve 7 motor function) (limited exam due to video visit)          [x] No gaze palsy        [] Abnormal -          Skin:        [x] No significant exanthematous lesions or discoloration noted on facial skin         [] Abnormal -            Psychiatric:       [x] Normal Affect [] Abnormal -        [x] No Hallucinations    Other pertinent observable physical exam findings:-        We discussed the expected course, resolution and complications of the diagnosis(es) in detail. Medication risks, benefits, costs, interactions, and alternatives were discussed as indicated. I advised him to contact the office if his condition worsens, changes or fails to improve as anticipated. He expressed understanding with the diagnosis(es) and plan. Juventino Morin is a 75 y.o. male who was evaluated by a video visit encounter for concerns as above. Patient identification was verified prior to start of the visit. A caregiver was present when appropriate.  Due to this being a TeleHealth encounter (During [place] [address] public health emergency), evaluation of the following organ systems was limited: Vitals/Constitutional/EENT/Resp/CV/GI//MS/Neuro/Skin/Heme-Lymph-Imm. Pursuant to the emergency declaration under the [address] [place] [address] waiver authority and the [employer] Act, this Virtual  Visit was conducted, with patient's (and/or legal guardian's) consent, to reduce the patient's risk of exposure to COVID-19 and provide necessary medical care. Services were provided through a video synchronous discussion virtually to substitute for in-person clinic visit. Patient and provider were located at their individual homes. Reginald Luevano MD    Please note that portions of this dictation may have been recorded with voice recognition software. Some unanticipated grammatical, syntax, homophones, and other interpretive errors are inadvertently transcribed by the computer software. An attempt at proof reading has been made to minimize errors and omissions. Please disregard these errors. Thank you.

## 2020-08-26 ENCOUNTER — HOSPITAL ENCOUNTER (EMERGENCY)
Age: 65
Discharge: HOME | End: 2020-08-26
Attending: EMERGENCY MEDICINE
Payer: MEDICARE

## 2020-08-26 VITALS
TEMPERATURE: 99.4 F | SYSTOLIC BLOOD PRESSURE: 168 MMHG | RESPIRATION RATE: 16 BRPM | HEART RATE: 83 BPM | DIASTOLIC BLOOD PRESSURE: 100 MMHG | BODY MASS INDEX: 34.77 KG/M2 | OXYGEN SATURATION: 98 % | WEIGHT: 262.35 LBS | HEIGHT: 73 IN

## 2020-08-26 DIAGNOSIS — M10.9 ACUTE GOUT OF LEFT ANKLE, UNSPECIFIED CAUSE: Primary | ICD-10-CM

## 2020-08-26 PROCEDURE — 99282 EMERGENCY DEPT VISIT SF MDM: CPT

## 2020-08-26 RX ORDER — COLCHICINE 0.6 MG/1
0.6 TABLET ORAL DAILY
Qty: 20 TAB | Refills: 0 | Status: SHIPPED | OUTPATIENT
Start: 2020-08-26 | End: 2020-12-23

## 2020-08-26 RX ORDER — PREDNISONE 10 MG/1
TABLET ORAL
Qty: 21 TAB | Refills: 0 | Status: SHIPPED | OUTPATIENT
Start: 2020-08-26 | End: 2020-09-01

## 2020-08-26 NOTE — ED PROVIDER NOTES
EMERGENCY DEPARTMENT HISTORY AND PHYSICAL EXAM      Date: 8/26/2020  Patient Name: Cristobal Ward.    History of Presenting Illness     Chief Complaint   Patient presents with    Foot Pain     L foot pain that started Sunday; patient reports a history of gout; ambulatory into triage       History Provided By: Patient    HPI: Mayelin Weaver Sr., 77 y.o. male with significant PMHx for HTN and gout, presents by POV to the ED with cc of acute onset of right ankle pain 4 days ago. He notes that this pain is similar to his prior gout attacks but that he is never had gout in his ankle. It is prominently been in his toes. He took ibuprofen yesterday without relief of complaint. He denies any injury. The pain is a constant pain that worsens with movement. The pain is nonradiating. There are no other complaints, changes, or physical findings at this time. Social Hx: Tobacco (denies), EtOH (denies), Illicit drug use (denies), works as a  for a [employer] community     PCP: Simone Mcbride MD    No current facility-administered medications on file prior to encounter. Current Outpatient Medications on File Prior to Encounter   Medication Sig Dispense Refill    metFORMIN ER (GLUCOPHAGE XR) 500 mg tablet Take 1 Tab by mouth daily (with dinner). 30 Tab 11    atorvastatin (LIPITOR) 20 mg tablet Take 1 Tab by mouth daily. 30 Tab 11    emollient topical cream Apply  to affected area as needed for Dry Skin. 120 g 11    tamsulosin (FLOMAX) 0.4 mg capsule Take 1 Cap by mouth daily. 90 Cap 3    finasteride (PROSCAR) 5 mg tablet TAKE 1 TABLET BY MOUTH EVERY DAY 90 Tab 4    atenoloL (TENORMIN) 50 mg tablet Take 1 Tab by mouth nightly. 90 Tab 3    amLODIPine (NORVASC) 5 mg tablet TAKE 1 TABLET DAILY 90 Tab 3    triamcinolone acetonide (KENALOG) 0.1 % topical cream Apply  to affected area two (2) times a day. 45 g 11    inulin (FIBER CHOICE, INULIN,) 1.5 gram chew Take 2 Tabs by mouth daily.  [address] [address] Tab 11    cholecalciferol, vitamin D3, (VITAMIN D3) 2,000 unit tab Take 1 Tab by mouth daily. 90 Tab 11    ascorbic acid, vitamin C, (VITAMIN C) 500 mg tablet Take 2 Tabs by mouth daily. 90 Tab 11    emollient topical cream Apply  to affected area as needed. 453 g 11    [DISCONTINUED] colchicine 0.6 mg tablet Take 1 Tab by mouth daily. 80 Tab 3       Past History     Past Medical History:  Past Medical History:   Diagnosis Date    BPH (benign prostatic hyperplasia)     Gout     Hamstring muscle strain 03/06/2019    Hypercholesterolemia     Hypertension     IGT (impaired glucose tolerance) 08/01/2018    Prostatism     S/P colonoscopic polypectomy 05/31/2017    colon polyp Isha Saunders MD    Trigger thumb of right hand 07/17/2019       Past Surgical History:  Past Surgical History:   Procedure Laterality Date    COLONOSCOPY N/A 5/31/2017    COLONOSCOPY performed by Isha Saunders MD at [facility]"" ENDOSCOPY    HX COLONOSCOPY      HX OTHER SURGICAL      ingrown toenail repair       Family History:  Family History   Problem Relation Age of Onset    Cancer Mother     Hypertension Mother     Cancer Father        Social History:  Social History     Tobacco Use    Smoking status: Never Smoker    Smokeless tobacco: Never Used   Substance Use Topics    Alcohol use: No    Drug use: No       Allergies:  No Known Allergies      Review of Systems   Review of Systems   Constitutional: Negative for chills, diaphoresis and fever. HENT: Negative for congestion, ear pain, rhinorrhea and sore throat. Respiratory: Negative for cough and shortness of breath. Cardiovascular: Negative for chest pain. Gastrointestinal: Negative for abdominal pain, constipation, diarrhea, nausea and vomiting. Genitourinary: Negative for difficulty urinating, dysuria, frequency and hematuria. Musculoskeletal: Positive for arthralgias and gait problem. Negative for myalgias. Neurological: Negative for headaches.    All other systems reviewed and are negative. Physical Exam   Physical Exam  Vitals signs and nursing note reviewed. Constitutional:       General: He is not in acute distress. Appearance: He is well-developed. He is not diaphoretic. Comments: 77 y.o. -American male    HENT:      Head: Normocephalic and atraumatic. Eyes:      General:         Right eye: No discharge. Left eye: No discharge. Conjunctiva/sclera: Conjunctivae normal.   Neck:      Musculoskeletal: Normal range of motion and neck supple. Cardiovascular:      Rate and Rhythm: Normal rate. Pulses: Normal pulses. Pulmonary:      Effort: Pulmonary effort is normal.   Musculoskeletal:      Comments: RIGHT ANKLE: + swelling. No ecchymosis or deformity. Diffuse TTP without point tenderness. FROM of the ankle and toes. Distal NV intact. Cap refill < 2 sec. Ambulatory with limp. Skin:     General: Skin is warm and dry. Neurological:      Mental Status: He is alert and oriented to person, place, and time. Psychiatric:         Behavior: Behavior normal.         Diagnostic Study Results     Labs - none    Radiologic Studies - none     Medical Decision Making   I am the first provider for this patient. I reviewed the vital signs, available nursing notes, past medical history, past surgical history, family history and social history. Vital Signs-Reviewed the patient's vital signs. Patient Vitals for the past 12 hrs:   Temp Pulse Resp BP SpO2   08/26/20 1154 99.4 °F (37.4 °C) 83 16 (!) 168/100 98 %       Records Reviewed: Nursing Notes    Provider Notes (Medical Decision Making):   Patient presents the ED with nontraumatic right ankle pain. Differential diagnosis include gout, osteoarthritis, degenerative joint disease. Will treat for gout due to history and presentation. ED Course:   Initial assessment performed.  The patients presenting problems have been discussed, and they are in agreement with the care plan formulated and outlined with them. I have encouraged them to ask questions as they arise throughout their visit. Critical Care Time: None    Disposition:  DISCHARGE NOTE:  1:20 PM  The pt is ready for discharge. The pt's signs, symptoms, diagnosis, and discharge instructions have been discussed and pt has conveyed their understanding. The pt is to follow up as recommended or return to ER should their symptoms worsen. Plan has been discussed and pt is in agreement. PLAN:  1. Current Discharge Medication List      START taking these medications    Details   predniSONE (STERAPRED DS) 10 mg dose pack Standard 6 day taper  Qty: 21 Tab, Refills: 0         CONTINUE these medications which have CHANGED    Details   colchicine 0.6 mg tablet Take 1 Tab by mouth daily. Qty: 20 Tab, Refills: 0         CONTINUE these medications which have NOT CHANGED    Details   metFORMIN ER (GLUCOPHAGE XR) 500 mg tablet Take 1 Tab by mouth daily (with dinner). Qty: 30 Tab, Refills: 11      atorvastatin (LIPITOR) 20 mg tablet Take 1 Tab by mouth daily. Qty: 30 Tab, Refills: 11      !! emollient topical cream Apply  to affected area as needed for Dry Skin. Qty: 120 g, Refills: 11      tamsulosin (FLOMAX) 0.4 mg capsule Take 1 Cap by mouth daily. Qty: 90 Cap, Refills: 3      finasteride (PROSCAR) 5 mg tablet TAKE 1 TABLET BY MOUTH EVERY DAY  Qty: 90 Tab, Refills: 4      atenoloL (TENORMIN) 50 mg tablet Take 1 Tab by mouth nightly. Qty: 90 Tab, Refills: 3      amLODIPine (NORVASC) 5 mg tablet TAKE 1 TABLET DAILY  Qty: 90 Tab, Refills: 3      triamcinolone acetonide (KENALOG) 0.1 % topical cream Apply  to affected area two (2) times a day. Qty: 45 g, Refills: 11      inulin (FIBER CHOICE, INULIN,) 1.5 gram chew Take 2 Tabs by mouth daily. Qty: 180 Tab, Refills: 11      cholecalciferol, vitamin D3, (VITAMIN D3) 2,000 unit tab Take 1 Tab by mouth daily.   Qty: 90 Tab, Refills: 11      ascorbic acid, vitamin C, (VITAMIN C) 500 mg tablet Take 2 Tabs by mouth daily. Qty: 90 Tab, Refills: 11      !! emollient topical cream Apply  to affected area as needed. Qty: 453 g, Refills: 11       !! - Potential duplicate medications found. Please discuss with provider. 2.   Follow-up Information     Follow up With Specialties Details Why Contact Info    Rick Sexton MD Internal Medicine In 1 week As needed [place] [address] [phone]          Return to ED if worse     Diagnosis     Clinical Impression:   1. Acute gout of left ankle, unspecified cause          Please note that this dictation was completed with [employer], the computer voice recognition software. Quite often unanticipated grammatical, syntax, homophones, and other interpretive errors are inadvertently transcribed by the computer software. Please disregards these errors. Please excuse any errors that have escaped final proofreading. This note will not be viewable in [address] I was personally available for consultation in the emergency department. I have reviewed the chart and agree with the documentation recorded by the mid level provider, including the assessment, treatment plan, and disposition.   Abel Flores MD

## 2020-08-26 NOTE — DISCHARGE INSTRUCTIONS
Patient Education        Gout: Care Instructions  Your Care Instructions     Gout is a form of arthritis caused by a buildup of uric acid crystals in a joint. It causes sudden attacks of pain, swelling, redness, and stiffness, usually in one joint, especially the big toe. Gout usually comes on without a cause. But it can be brought on by drinking alcohol (especially beer) or eating seafood and red meat. Taking certain medicines, such as diuretics or aspirin, also can bring on an attack of gout. Taking your medicines as prescribed and following up with your doctor regularly can help you avoid gout attacks in the future. Follow-up care is a key part of your treatment and safety. Be sure to make and go to all appointments, and call your doctor if you are having problems. It's also a good idea to know your test results and keep a list of the medicines you take. How can you care for yourself at home? · If the joint is swollen, put ice or a cold pack on the area for 10 to 20 minutes at a time. Put a thin cloth between the ice and your skin. · Prop up the sore limb on a pillow when you ice it or anytime you sit or lie down during the next 3 days. Try to keep it above the level of your heart. This will help reduce swelling. · Rest sore joints. Avoid activities that put weight or strain on the joints for a few days. Take short rest breaks from your regular activities during the day. · Take your medicines exactly as prescribed. Call your doctor if you think you are having a problem with your medicine. · Take pain medicines exactly as directed. ? If the doctor gave you a prescription medicine for pain, take it as prescribed. ? If you are not taking a prescription pain medicine, ask your doctor if you can take an over-the-counter medicine. · Eat less seafood and red meat. · Check with your doctor before drinking alcohol. · Losing weight, if you are overweight, may help reduce attacks of gout.  But do not go on a \"crash diet. \" Losing a lot of weight in a short amount of time can cause a gout attack. When should you call for help? Call your doctor now or seek immediate medical care if:  · You have a fever. · The joint is so painful you cannot use it. · You have sudden, unexplained swelling, redness, warmth, or severe pain in one or more joints. Watch closely for changes in your health, and be sure to contact your doctor if:  · You have joint pain. · Your symptoms get worse or are not improving after 2 or 3 days. Where can you learn more? Go to http://stephan-genna.info/  Enter E531 in the search box to learn more about \"Gout: Care Instructions. \"  Current as of: December 9, 2019               Content Version: 12.5  © 7321-0015 [employer]. Care instructions adapted under license by [employer] (which disclaims liability or warranty for this information). If you have questions about a medical condition or this instruction, always ask your healthcare professional. [place] [address] any warranty or liability for your use of this information. Patient Education        Purine-Restricted Diet: Care Instructions  Your Care Instructions     Purines are substances that are found in some foods. Your body turns purines into uric acid. High levels of uric acid can cause gout, which is a form of arthritis that causes pain and inflammation in joints. You may be able to help control the amount of uric acid in your body by limiting high-purine foods in your diet. Follow-up care is a key part of your treatment and safety. Be sure to make and go to all appointments, and call your doctor if you are having problems. It's also a good idea to know your test results and keep a list of the medicines you take. How can you care for yourself at home? · Plan your meals and snacks around foods that are low in purines and are safe for you to eat.  These foods include:  ? Watson Rojas vegetables and tomatoes. ? Fruits. ? Whole-grain breads, rice, and cereals. ? Eggs, peanut butter, and nuts. ? Low-fat milk, cheese, and other milk products. ? Popcorn. ? Gelatin desserts, chocolate, cocoa, and cakes and sweets, in small amounts. · You can eat certain foods that are medium-high in purines, but eat them only once in a while. These foods include:  ? Legumes, such as dried beans and dried peas. You can have 1 cup cooked legumes each day. ? Asparagus, cauliflower, spinach, mushrooms, and green peas. ? Fish and seafood (other than very high-purine seafood). ? Oatmeal, wheat bran, and wheat germ. · Limit very high-purine foods, including:  ? Organ meats, such as liver, kidneys, sweetbreads, and brains. ? Meats, including العلي, beef, pork, and lamb. ? Game meats and any other meats in large amounts. ? Anchovies, sardines, herring, mackerel, and scallops. ? Gravy. ? Beer. Where can you learn more? Go to http://www.gray.com/  Enter F448 in the search box to learn more about \"Purine-Restricted Diet: Care Instructions. \"  Current as of: August 22, 2019               Content Version: 12.5  © 3826-7650 [employer]. Care instructions adapted under license by [employer] (which disclaims liability or warranty for this information). If you have questions about a medical condition or this instruction, always ask your healthcare professional. [place] [address] any warranty or liability for your use of this information.

## 2020-08-26 NOTE — ED NOTES
Pt discharged by HONG Chaduhry. Pt provided with discharge instructions Rx and instructions on follow up care. Pt out of ED ambulatory.

## 2020-09-02 PROCEDURE — 96360 HYDRATION IV INFUSION INIT: CPT

## 2020-09-02 PROCEDURE — 93005 ELECTROCARDIOGRAM TRACING: CPT

## 2020-09-02 PROCEDURE — 99285 EMERGENCY DEPT VISIT HI MDM: CPT

## 2020-09-03 ENCOUNTER — HOSPITAL ENCOUNTER (EMERGENCY)
Age: 65
Discharge: HOME | End: 2020-09-03
Attending: EMERGENCY MEDICINE
Payer: MEDICARE

## 2020-09-03 ENCOUNTER — APPOINTMENT (OUTPATIENT)
Dept: CT IMAGING | Age: 65
End: 2020-09-03
Attending: EMERGENCY MEDICINE
Payer: MEDICARE

## 2020-09-03 VITALS
RESPIRATION RATE: 23 BRPM | BODY MASS INDEX: 34.16 KG/M2 | HEIGHT: 73 IN | TEMPERATURE: 98 F | HEART RATE: 72 BPM | OXYGEN SATURATION: 98 % | SYSTOLIC BLOOD PRESSURE: 144 MMHG | WEIGHT: 257.72 LBS | DIASTOLIC BLOOD PRESSURE: 97 MMHG

## 2020-09-03 DIAGNOSIS — R42 DIZZINESS: ICD-10-CM

## 2020-09-03 DIAGNOSIS — E86.0 DEHYDRATION: Primary | ICD-10-CM

## 2020-09-03 LAB
ALBUMIN SERPL-MCNC: 3.7 G/DL (ref 3.5–5)
ALBUMIN/GLOB SERPL: 0.9 {RATIO} (ref 1.1–2.2)
ALP SERPL-CCNC: 80 U/L (ref 45–117)
ALT SERPL-CCNC: 37 U/L (ref 12–78)
ANION GAP SERPL CALC-SCNC: 4 MMOL/L (ref 5–15)
APPEARANCE UR: CLEAR
AST SERPL-CCNC: 31 U/L (ref 15–37)
BACTERIA URNS QL MICRO: NEGATIVE /HPF
BASOPHILS # BLD: 0.1 K/UL (ref 0–0.1)
BASOPHILS NFR BLD: 1 % (ref 0–1)
BILIRUB SERPL-MCNC: 0.6 MG/DL (ref 0.2–1)
BILIRUB UR QL: NEGATIVE
BUN SERPL-MCNC: 13 MG/DL (ref 6–20)
BUN/CREAT SERPL: 10 (ref 12–20)
CALCIUM SERPL-MCNC: 9.2 MG/DL (ref 8.5–10.1)
CHLORIDE SERPL-SCNC: 105 MMOL/L (ref 97–108)
CO2 SERPL-SCNC: 29 MMOL/L (ref 21–32)
COLOR UR: NORMAL
CREAT SERPL-MCNC: 1.31 MG/DL (ref 0.7–1.3)
DIFFERENTIAL METHOD BLD: ABNORMAL
EOSINOPHIL # BLD: 0.1 K/UL (ref 0–0.4)
EOSINOPHIL NFR BLD: 2 % (ref 0–7)
EPITH CASTS URNS QL MICRO: NORMAL /LPF
ERYTHROCYTE [DISTWIDTH] IN BLOOD BY AUTOMATED COUNT: 14.6 % (ref 11.5–14.5)
GLOBULIN SER CALC-MCNC: 4.1 G/DL (ref 2–4)
GLUCOSE SERPL-MCNC: 125 MG/DL (ref 65–100)
GLUCOSE UR STRIP.AUTO-MCNC: NEGATIVE MG/DL
HCT VFR BLD AUTO: 47.8 % (ref 36.6–50.3)
HGB BLD-MCNC: 15.7 G/DL (ref 12.1–17)
HGB UR QL STRIP: NEGATIVE
HYALINE CASTS URNS QL MICRO: NORMAL /LPF (ref 0–5)
IMM GRANULOCYTES # BLD AUTO: 0.1 K/UL (ref 0–0.04)
IMM GRANULOCYTES NFR BLD AUTO: 1 % (ref 0–0.5)
KETONES UR QL STRIP.AUTO: NEGATIVE MG/DL
LEUKOCYTE ESTERASE UR QL STRIP.AUTO: NEGATIVE
LYMPHOCYTES # BLD: 2.5 K/UL (ref 0.8–3.5)
LYMPHOCYTES NFR BLD: 37 % (ref 12–49)
MCH RBC QN AUTO: 28.6 PG (ref 26–34)
MCHC RBC AUTO-ENTMCNC: 32.8 G/DL (ref 30–36.5)
MCV RBC AUTO: 87.2 FL (ref 80–99)
MONOCYTES # BLD: 0.7 K/UL (ref 0–1)
MONOCYTES NFR BLD: 10 % (ref 5–13)
NEUTS SEG # BLD: 3.3 K/UL (ref 1.8–8)
NEUTS SEG NFR BLD: 49 % (ref 32–75)
NITRITE UR QL STRIP.AUTO: NEGATIVE
NRBC # BLD: 0 K/UL (ref 0–0.01)
NRBC BLD-RTO: 0 PER 100 WBC
PH UR STRIP: 6 [PH] (ref 5–8)
PLATELET # BLD AUTO: 210 K/UL (ref 150–400)
PMV BLD AUTO: 9.2 FL (ref 8.9–12.9)
POTASSIUM SERPL-SCNC: 3.8 MMOL/L (ref 3.5–5.1)
PROT SERPL-MCNC: 7.8 G/DL (ref 6.4–8.2)
PROT UR STRIP-MCNC: NEGATIVE MG/DL
RBC # BLD AUTO: 5.48 M/UL (ref 4.1–5.7)
RBC #/AREA URNS HPF: NORMAL /HPF (ref 0–5)
SODIUM SERPL-SCNC: 138 MMOL/L (ref 136–145)
SP GR UR REFRACTOMETRY: 1.02 (ref 1–1.03)
TROPONIN I SERPL-MCNC: <0.05 NG/ML
UA: UC IF INDICATED,UAUC: NORMAL
UROBILINOGEN UR QL STRIP.AUTO: 1 EU/DL (ref 0.2–1)
WBC # BLD AUTO: 6.7 K/UL (ref 4.1–11.1)
WBC URNS QL MICRO: NORMAL /HPF (ref 0–4)

## 2020-09-03 PROCEDURE — 81001 URINALYSIS AUTO W/SCOPE: CPT

## 2020-09-03 PROCEDURE — 85025 COMPLETE CBC W/AUTO DIFF WBC: CPT

## 2020-09-03 PROCEDURE — 84484 ASSAY OF TROPONIN QUANT: CPT

## 2020-09-03 PROCEDURE — 36415 COLL VENOUS BLD VENIPUNCTURE: CPT

## 2020-09-03 PROCEDURE — 74011250636 HC RX REV CODE- 250/636: Performed by: EMERGENCY MEDICINE

## 2020-09-03 PROCEDURE — 70450 CT HEAD/BRAIN W/O DYE: CPT

## 2020-09-03 PROCEDURE — 80053 COMPREHEN METABOLIC PANEL: CPT

## 2020-09-03 RX ADMIN — SODIUM CHLORIDE 500 ML: 900 INJECTION, SOLUTION INTRAVENOUS at 03:01

## 2020-09-03 NOTE — ED NOTES
Pt presents ambulatory to the ED c/o \"pressure behind my right ear\". Pt states he was working in his yard yesterday for about 3 hours and then went to help put together furniture. Upon arrival home Pt states his sx began. Pt reports he had some hydration prior to and after working in the yard but not during. Pt describes the sensation as pressure but denies pain at this time. Pt states that he did not feel as though he was spinning or the room was spinning. Pt notes he takes bp medication and has been taking his medication as prescribed. Pt denies blurry vision, HA,  n,v,d, cp, sob, abdominal pain, change in gait/balance, recent falls, hx of head injury, DM, CV hx, respiratory hx.     [id number] - pt sent and returned from radiology, CT head      2:35 AM: pt to restroom to attempt to provide a urine sample. Pt ambulated with no difficulty, no apparent lack of balance. [address] MD at bedside for reeval    Pt's spouse remains at bedside for comfort, care and for a ride home       3:51 AM: I have reviewed discharge instructions with the patient. The patient verbalized understanding. IV removed and pt ambulatory to exit.

## 2020-09-03 NOTE — ED PROVIDER NOTES
EMERGENCY DEPARTMENT HISTORY AND PHYSICAL EXAM      Date: 9/3/2020  Patient Name: Sonia Torres.    History of Presenting Illness     Chief Complaint   Patient presents with    Dizziness     ambulatory into triage; pt reports he was \"out working in sun all day, and when he went to eat, he felt dizzy on right side on my head, pressure behind my right ear\": pt reports pressure has resolved, pt denies HA, denies vision changes, pt intermittent dizziness while ambulaTing, denies n/v, denies speech abnormalities       History Provided By: Patient and Patient's Wife    HPI: Rebekah Hurtado , 77 y.o. male  With past medical history of hypertension, BPH, gout presenting today with an episode of dizziness. The patient notes that he had been working outside in the sun all day and did not stay well-hydrated. He was sitting eating dinner tonight when he felt a pressure sensation in his head and had an episode of dizziness. He notes this lasted for short period of time and then resolved and he was waiting in the emergency department waiting room. He denies any chest pain, shortness of breath, palpitations. No numbness, tingling, weakness, or speech deficit with that time    There are no other complaints, changes, or physical findings at this time. PCP: Ag Fonseca MD    No current facility-administered medications on file prior to encounter. Current Outpatient Medications on File Prior to Encounter   Medication Sig Dispense Refill    colchicine 0.6 mg tablet Take 1 Tab by mouth daily. 20 Tab 0    metFORMIN ER (GLUCOPHAGE XR) 500 mg tablet Take 1 Tab by mouth daily (with dinner). 30 Tab 11    atorvastatin (LIPITOR) 20 mg tablet Take 1 Tab by mouth daily. 30 Tab 11    emollient topical cream Apply  to affected area as needed for Dry Skin. 120 g 11    tamsulosin (FLOMAX) 0.4 mg capsule Take 1 Cap by mouth daily.  90 Cap 3    finasteride (PROSCAR) 5 mg tablet TAKE 1 TABLET BY MOUTH EVERY DAY 90 Tab 4    atenoloL (TENORMIN) 50 mg tablet Take 1 Tab by mouth nightly. 90 Tab 3    amLODIPine (NORVASC) 5 mg tablet TAKE 1 TABLET DAILY 90 Tab 3    triamcinolone acetonide (KENALOG) 0.1 % topical cream Apply  to affected area two (2) times a day. 45 g 11    inulin (FIBER CHOICE, INULIN,) 1.5 gram chew Take 2 Tabs by mouth daily. 180 Tab 11    cholecalciferol, vitamin D3, (VITAMIN D3) 2,000 unit tab Take 1 Tab by mouth daily. 90 Tab 11    ascorbic acid, vitamin C, (VITAMIN C) 500 mg tablet Take 2 Tabs by mouth daily. 90 Tab 11    emollient topical cream Apply  to affected area as needed.  453 g 11       Past History     Past Medical History:  Past Medical History:   Diagnosis Date    BPH (benign prostatic hyperplasia)     Gout     Hamstring muscle strain 03/06/2019    Hypercholesterolemia     Hypertension     IGT (impaired glucose tolerance) 08/01/2018    Prostatism     S/P colonoscopic polypectomy 05/31/2017    colon polyp Claude Brewer MD    Trigger thumb of right hand 07/17/2019       Past Surgical History:  Past Surgical History:   Procedure Laterality Date    COLONOSCOPY N/A 5/31/2017    COL[facility] ENDOSCOPY    HX COLONOSCOPY      HX OTHER SURGICAL      ingrown toenail repair       Family History:  Family History   Problem Relation Age of Onset   [facility] Cancer Mother     Hypertension Mother     Cancer Father        Social History:  Social History     Tobacco Use    Smoking status: Never Smoker    Smokeless tobacco: Never Used   Substance Use Topics    Alcohol use: No    Drug use: No       Allergies:  No Known Allergies      Review of Systems   Constitutional: No  fever  Skin: No  rash  HEENT: No  nasal congestion  Resp: No cough  CV: No chest pain  GI: No vomiting  : No dysuria  MSK: No joint pain  Neuro: No numbness  Psych: No suicidal      Physical Exam     Patient Vitals for the past 12 hrs:   Temp Pulse Resp BP SpO2   09/03/20 0200  68 12 (!) 157/96    09/03/20 0145  65 15 138/89    09/02/20 2341 98.5 °F (36.9 °C) 76 16 (!) 159/96 100 %     General: alert, No acute distress  Eyes: EOMI, normal conjunctiva  ENT: moist mucous membranes. Neck: Active, full ROM of neck. Skin: No rashes. no jaundice              Lungs: Equal chest expansion. no respiratory distress. Heart: regular rate     no peripheral edema     Abd:  non distended soft   Back: Full ROM  MSK: Full, active ROM in all 4 extremities. Neuro:   II: vision grossly intact  III, IV, VI: Extraocular motion intact, pupils reactive to light  V: facial sensation intact  VII: face symmetric with normal eye closure and smile  VIII: hearing intact to finger rub bilaterally  IX, X: palate elevates symmetrically, phonation normal  XII: tongue midline with normal movements  Motor: normal bulk, tone, and strength bilaterally, no pronator drift  Sensory: normal sensation to light touch in bilateral upper and lower extremities  Coordination: intact to rapid alternating movements  Gait: steady gait with normal steps, and arm swing. Normal romberg.   Psych: Cooperative with exam; Appropriate mood and affect             Diagnostic Study Results     Labs -     Recent Results (from the past 12 hour(s))   EKG, 12 LEAD, INITIAL    Collection Time: 09/02/20 11:54 PM   Result Value Ref Range    Ventricular Rate 75 BPM    Atrial Rate 75 BPM    P-R Interval 180 ms    QRS Duration 170 ms    Q-T Interval 452 ms    QTC Calculation (Bezet) 504 ms    Calculated P Axis 68 degrees    Calculated R Axis -56 degrees    Calculated T Axis 3 degrees    Diagnosis       Normal sinus rhythm  Right bundle branch block  Left anterior fascicular block  ** Bifascicular block **  Cannot rule out Inferior infarct (cited on or before 24-AUG-2019)  When compared with ECG of 24-AUG-2019 22:38,  No significant change was found     CBC WITH AUTOMATED DIFF    Collection Time: 09/03/20  1:51 AM   Result Value Ref Range    WBC 6.7 4.1 - 11.1 K/uL    RBC 5.48 4.10 - 5.70 M/uL    HGB 15.7 12.1 - 17.0 g/dL    HCT 47.8 36.6 - 50.3 %    MCV 87.2 80.0 - 99.0 FL    MCH 28.6 26.0 - 34.0 PG    MCHC 32.8 30.0 - 36.5 g/dL    RDW 14.6 (H) 11.5 - 14.5 %    PLATELET 672 187 - 712 K/uL    MPV 9.2 8.9 - 12.9 FL    NRBC 0.0 0  WBC    ABSOLUTE NRBC 0.00 0.00 - 0.01 K/uL    NEUTROPHILS 49 32 - 75 %    LYMPHOCYTES 37 12 - 49 %    MONOCYTES 10 5 - 13 %    EOSINOPHILS 2 0 - 7 %    BASOPHILS 1 0 - 1 %    IMMATURE GRANULOCYTES 1 (H) 0.0 - 0.5 %    ABS. NEUTROPHILS 3.3 1.8 - 8.0 K/UL    ABS. LYMPHOCYTES 2.5 0.8 - 3.5 K/UL    ABS. MONOCYTES 0.7 0.0 - 1.0 K/UL    ABS. EOSINOPHILS 0.1 0.0 - 0.4 K/UL    ABS. BASOPHILS 0.1 0.0 - 0.1 K/UL    ABS. IMM. GRANS. 0.1 (H) 0.00 - 0.04 K/UL    DF AUTOMATED     METABOLIC PANEL, COMPREHENSIVE    Collection Time: 09/03/20  1:51 AM   Result Value Ref Range    Sodium 138 136 - 145 mmol/L    Potassium 3.8 3.5 - 5.1 mmol/L    Chloride 105 97 - 108 mmol/L    CO2 29 21 - 32 mmol/L    Anion gap 4 (L) 5 - 15 mmol/L    Glucose 125 (H) 65 - 100 mg/dL    BUN 13 6 - 20 MG/DL    Creatinine 1.31 (H) 0.70 - 1.30 MG/DL    BUN/Creatinine ratio 10 (L) 12 - 20      GFR est AA >60 >60 ml/min/1.73m2    GFR est non-AA 55 (L) >60 ml/min/1.73m2    Calcium 9.2 8.5 - 10.1 MG/DL    Bilirubin, total 0.6 0.2 - 1.0 MG/DL    ALT (SGPT) 37 12 - 78 U/L    AST (SGOT) 31 15 - 37 U/L    Alk.  phosphatase 80 45 - 117 U/L    Protein, total 7.8 6.4 - 8.2 g/dL    Albumin 3.7 3.5 - 5.0 g/dL    Globulin 4.1 (H) 2.0 - 4.0 g/dL    A-G Ratio 0.9 (L) 1.1 - 2.2     TROPONIN I    Collection Time: 09/03/20  1:51 AM   Result Value Ref Range    Troponin-I, Qt. <0.05 <0.05 ng/mL   URINALYSIS W/ REFLEX CULTURE    Collection Time: 09/03/20  2:39 AM    Specimen: Urine   Result Value Ref Range    Color YELLOW/STRAW      Appearance CLEAR CLEAR      Specific gravity 1.022 1.003 - 1.030      pH (UA) 6.0 5.0 - 8.0      Protein Negative NEG mg/dL    Glucose Negative NEG mg/dL    Ketone Negative NEG mg/dL    Bilirubin Negative NEG      Blood Negative NEG      Urobilinogen 1.0 0.2 - 1.0 EU/dL    Nitrites Negative NEG      Leukocyte Esterase Negative NEG      WBC 0-4 0 - 4 /hpf    RBC 0-5 0 - 5 /hpf    Epithelial cells FEW FEW /lpf    Bacteria Negative NEG /hpf    UA:UC IF INDICATED CULTURE NOT INDICATED BY UA RESULT CNI      Hyaline cast 0-2 0 - 5 /lpf       Radiologic Studies -   CT HEAD WO CONT   Final Result   IMPRESSION:    No evidence of acute process. CT Results  (Last 48 hours)               09/03/20 0232  CT HEAD WO CONT Final result    Impression:  IMPRESSION:    No evidence of acute process. Narrative:  EXAM: CT HEAD WO CONT       INDICATION: dizziness       COMPARISON: 5/28/2015. CONTRAST: None. TECHNIQUE: Unenhanced CT of the head was performed using 5 mm images. Brain and   bone windows were generated. Coronal and sagittal reformats. CT dose reduction   was achieved through use of a standardized protocol tailored for this   examination and automatic exposure control for dose modulation. FINDINGS:   The ventricles and sulci are normal in size, shape and configuration. . There is   no significant white matter disease. There is no intracranial hemorrhage,   extra-axial collection, or mass effect. The basilar cisterns are open. No CT   evidence of acute infarct. The bone windows demonstrate no abnormalities. The visualized portions of the   paranasal sinuses and mastoid air cells are clear. CXR Results  (Last 48 hours)    None          Medical Decision Making   I am the first provider for this patient. I reviewed the vital signs, available nursing notes, past medical history, past surgical history, family history and social history. Vital Signs-Reviewed the patient's vital signs.   Patient Vitals for the past 12 hrs:   Temp Pulse Resp BP SpO2   09/03/20 0200  68 12 (!) 157/96    09/03/20 0145  65 15 138/89  09/02/20 2341 98.5 °F (36.9 °C) 76 16 (!) 159/96 100 %       Pulse Oximetry Analysis - 100% on room air    Cardiac Monitor:   Rate: 68 bpm  Rhythm: Normal Sinus Rhythm      Provider Notes (Medical Decision Making):     Differential Diagnosis: Dehydration, electrolyte abnormality, arrhythmia, ACS, stroke, TIA    Initial Plan: Labs, CT head, EKG, and reassess after IV fluid administration. ED Course:   Initial assessment performed. The patients presenting problems have been discussed, and they are in agreement with the care plan formulated and outlined with them. I have encouraged them to ask questions as they arise throughout their visit. ED Course as of Sep 03 0332   Thu Sep 03, 2020   0155 On my interpretation the patient's EKG normal sinus rhythm at a rate of 75, bifascicular block, QTC is 504, no ST elevation or depression. No changes from previous EKG    [NW]   0250 On my interpretation of the patient's laboratory studies CBC is unremarkable, urinalysis with no evidence of infection, troponin negative, and metabolic panel without significant abnormality. [NW]   [phone] On my interpretation of the patient CT had no evidence of acute intracranial abnormality. [NW]   9121 Patient presenting today with an episode of dizziness that has since resolved. He was working outside for long period today and I suspect a component of dehydration. He has normal vital signs, his EKG has not changed from previous and he has no chest pain or shortness of breath. CT head negative, metabolic panel revealing mild amount of dehydration. Patient treated with IV fluids. He continues to be asymptomatic. Ultimately is discharged with return precautions. [NW]      ED Course User Index  [NW] Bambi Rosenberg MD       I, Marycruz oLwe MD, am the attending of record for this patient encounter. Dispo: Discharged. The patient has been re-evaluated and is ready for discharge. Reviewed available results with patient. Counseled patient on diagnosis and care plan. Patient has expressed understanding, and all questions have been answered. Patient agrees with plan and agrees to follow up as recommended, or to return to the ED if their symptoms worsen. Discharge instructions have been provided and explained to the patient, along with reasons to return to the ED. PLAN:  Current Discharge Medication List        2. Follow-up Information    None       3. Return to ED if worse       Diagnosis     Clinical Impression:   1. Dehydration    2. Dizziness        Attestations:    Violeta Shanks MD    Please note that this dictation was completed with [employer], the [employer] voice recognition software. Quite often unanticipated grammatical, syntax, homophones, and other interpretive errors are inadvertently transcribed by the computer software. Please disregard these errors. Please excuse any errors that have escaped final proofreading. Thank you.

## 2020-09-04 LAB
ATRIAL RATE: 75 BPM
CALCULATED P AXIS, ECG09: 68 DEGREES
CALCULATED R AXIS, ECG10: -56 DEGREES
CALCULATED T AXIS, ECG11: 3 DEGREES
DIAGNOSIS, 93000: NORMAL
P-R INTERVAL, ECG05: 180 MS
Q-T INTERVAL, ECG07: 452 MS
QRS DURATION, ECG06: 170 MS
QTC CALCULATION (BEZET), ECG08: 504 MS
VENTRICULAR RATE, ECG03: 75 BPM

## 2020-09-26 ENCOUNTER — HOSPITAL ENCOUNTER (OUTPATIENT)
Dept: PREADMISSION TESTING | Age: 65
Discharge: HOME | End: 2020-09-26
Payer: MEDICARE

## 2020-09-26 PROCEDURE — 87635 SARS-COV-2 COVID-19 AMP PRB: CPT

## 2020-09-28 LAB
HEALTH STATUS, XMCV2T: NORMAL
SARS-COV-2, COV2NT: NOT DETECTED
SOURCE, COVRS: NORMAL
SPECIMEN SOURCE, FCOV2M: NORMAL
SPECIMEN TYPE, XMCV1T: NORMAL

## 2020-09-30 ENCOUNTER — ANESTHESIA EVENT (OUTPATIENT)
Dept: ENDOSCOPY | Age: 65
End: 2020-09-30
Payer: MEDICARE

## 2020-09-30 ENCOUNTER — ANESTHESIA (OUTPATIENT)
Dept: ENDOSCOPY | Age: 65
End: 2020-09-30
Payer: MEDICARE

## 2020-09-30 ENCOUNTER — HOSPITAL ENCOUNTER (OUTPATIENT)
Age: 65
Setting detail: OUTPATIENT SURGERY
Discharge: HOME | End: 2020-09-30
Attending: SPECIALIST | Admitting: SPECIALIST
Payer: MEDICARE

## 2020-09-30 VITALS
OXYGEN SATURATION: 99 % | SYSTOLIC BLOOD PRESSURE: 141 MMHG | RESPIRATION RATE: 19 BRPM | WEIGHT: 260.7 LBS | HEIGHT: 73 IN | BODY MASS INDEX: 34.55 KG/M2 | TEMPERATURE: 97.8 F | HEART RATE: 60 BPM | DIASTOLIC BLOOD PRESSURE: 83 MMHG

## 2020-09-30 PROCEDURE — 76040000019: Performed by: SPECIALIST

## 2020-09-30 PROCEDURE — 88305 TISSUE EXAM BY PATHOLOGIST: CPT

## 2020-09-30 PROCEDURE — 2709999900 HC NON-CHARGEABLE SUPPLY: Performed by: SPECIALIST

## 2020-09-30 PROCEDURE — 77030013992 HC SNR POLYP ENDOSC BSC -B: Performed by: SPECIALIST

## 2020-09-30 PROCEDURE — 76060000031 HC ANESTHESIA FIRST 0.5 HR: Performed by: SPECIALIST

## 2020-09-30 PROCEDURE — 74011250636 HC RX REV CODE- 250/636: Performed by: REGISTERED NURSE

## 2020-09-30 PROCEDURE — 74011250636 HC RX REV CODE- 250/636: Performed by: SPECIALIST

## 2020-09-30 RX ORDER — SODIUM CHLORIDE 9 MG/ML
50 INJECTION, SOLUTION INTRAVENOUS CONTINUOUS
Status: DISCONTINUED | OUTPATIENT
Start: 2020-09-30 | End: 2020-09-30 | Stop reason: HOSPADM

## 2020-09-30 RX ORDER — PROPOFOL 10 MG/ML
INJECTION, EMULSION INTRAVENOUS AS NEEDED
Status: DISCONTINUED | OUTPATIENT
Start: 2020-09-30 | End: 2020-09-30 | Stop reason: HOSPADM

## 2020-09-30 RX ORDER — DEXTROMETHORPHAN/PSEUDOEPHED 2.5-7.5/.8
1.2 DROPS ORAL
Status: DISCONTINUED | OUTPATIENT
Start: 2020-09-30 | End: 2020-09-30 | Stop reason: HOSPADM

## 2020-09-30 RX ORDER — SODIUM CHLORIDE 0.9 % (FLUSH) 0.9 %
5-40 SYRINGE (ML) INJECTION EVERY 8 HOURS
Status: DISCONTINUED | OUTPATIENT
Start: 2020-09-30 | End: 2020-09-30 | Stop reason: HOSPADM

## 2020-09-30 RX ORDER — SODIUM CHLORIDE 0.9 % (FLUSH) 0.9 %
5-40 SYRINGE (ML) INJECTION AS NEEDED
Status: DISCONTINUED | OUTPATIENT
Start: 2020-09-30 | End: 2020-09-30 | Stop reason: HOSPADM

## 2020-09-30 RX ADMIN — PROPOFOL 50 MG: 10 INJECTION, EMULSION INTRAVENOUS at 12:09

## 2020-09-30 RX ADMIN — PROPOFOL 40 MG: 10 INJECTION, EMULSION INTRAVENOUS at 12:12

## 2020-09-30 RX ADMIN — PROPOFOL 110 MG: 10 INJECTION, EMULSION INTRAVENOUS at 12:02

## 2020-09-30 RX ADMIN — SODIUM CHLORIDE 50 ML/HR: 900 INJECTION, SOLUTION INTRAVENOUS at 11:08

## 2020-09-30 NOTE — ROUTINE PROCESS
Merline  Sr. 
1955 
[id number] Situation: 
Verbal report received from: Mar Efrain Procedure: Procedure(s): 
COLONOSCOPY 
ENDOSCOPIC POLYPECTOMY Background: 
 
Preoperative diagnosis: PERSONAL HISTORY OF POLYPS Postoperative diagnosis: colon polyps :  Dr. Philly Corona Assistant(s): Endoscopy Technician-1: Efra Moran Endoscopy RN-1: Sheridan Thompson RN Specimens:  
ID Type Source Tests Collected by Time Destination 1 : Colon, Descending BX Preservative Colon, Descending  Alejandrina Saravia MD 9/30/2020 1212 Pathology 2 : Rectum polyp Preservative Rectum  Alejandrina Saravia MD 9/30/2020 1216 Pathology H. Pylori  no Assessment: 
Intra-procedure medications Anesthesia gave intra-procedure sedation and medications, see anesthesia flow sheet yes Intravenous fluids: NS@ Deepak Nash Vital signs stable Abdominal assessment: round and soft Recommendation: 
Discharge patient per MD order. Return to floor Family or Friend Permission to share finding with family or friend yes

## 2020-09-30 NOTE — PROGRESS NOTES
Endoscope was pre-cleaned at the bedside immediately following procedure by Rupal Phillips    Medications     Medication Rate/Dose/Volume Action Route Date Time   Administering User Audit    propofol 10 mg/mL (mg) 110 mg Given IntraVENous 09/30/20  1202  Eva Finder., CRNA      50 mg Given IntraVENous  1209  Eva Finder., CRNA      40 mg Given IntraVENous  1111  Eva Finder., CRNA     0.9% sodium chloride infusion (mL) 300 mL Stopped IntraVENous 09/30/20  1218  Eva Finder., CRNA         .

## 2020-09-30 NOTE — ANESTHESIA POSTPROCEDURE EVALUATION
Procedure(s):  COLONOSCOPY  ENDOSCOPIC POLYPECTOMY. total IV anesthesia, general    Anesthesia Post Evaluation        Patient location during evaluation: PACU  Note status: Adequate. Level of consciousness: responsive to verbal stimuli and sleepy but conscious  Pain management: satisfactory to patient  Airway patency: patent  Anesthetic complications: no  Cardiovascular status: acceptable  Respiratory status: acceptable  Hydration status: acceptable  Comments: +Post-Anesthesia Evaluation and Assessment    Patient: Best Hollins Sr. MRN: [id number]  SSN: [id number]   YOB: 1955  Age: 77 y.o. Sex: male      Cardiovascular Function/Vital Signs    BP (!) 149/103   Pulse 65   Temp 36.6 °C (97.8 °F)   Resp 18   Ht 6' 1\" (1.854 m)   Wt 118.3 kg (260 lb 11.2 oz)   SpO2 99%   BMI 34.40 kg/m²     Patient is status post Procedure(s):  COLONOSCOPY  ENDOSCOPIC POLYPECTOMY. Nausea/Vomiting: Controlled. Postoperative hydration reviewed and adequate. Pain:  Pain Scale 1: Numeric (0 - 10) (09/30/20 1230)  Pain Intensity 1: 0 (09/30/20 1230)   Managed. Neurological Status: At baseline. Mental Status and Level of Consciousness: Arousable. Pulmonary Status:   O2 Device: Room air (09/30/20 1230)   Adequate oxygenation and airway patent. Complications related to anesthesia: None    Post-anesthesia assessment completed. No concerns. Signed By: Comfort Prabhakar MD    9/30/2020  Post anesthesia nausea and vomiting:  controlled      INITIAL Post-op Vital signs:   Vitals Value Taken Time   /83 9/30/2020 12:48 PM   Temp 36.6 °C (97.8 °F) 9/30/2020 12:30 PM   Pulse 60 9/30/2020 12:49 PM   Resp 17 9/30/2020 12:49 PM   SpO2 99 % 9/30/2020 12:48 PM   Vitals shown include unvalidated device data.

## 2020-09-30 NOTE — ANESTHESIA PREPROCEDURE EVALUATION
Anesthetic History   No history of anesthetic complications            Review of Systems / Medical History  Patient summary reviewed, nursing notes reviewed and pertinent labs reviewed    Pulmonary        Sleep apnea: CPAP           Neuro/Psych   Within defined limits           Cardiovascular    Hypertension: well controlled          Hyperlipidemia    Exercise tolerance: >4 METS  Comments:  EKG: SB   GI/Hepatic/Renal         Renal disease: CRI      Comments: Colon polyps Endo/Other    Diabetes: well controlled, type 2    Obesity and arthritis  Pertinent negatives: No morbid obesity   Other Findings   Comments: Gout  Prostatitis         Physical Exam    Airway  Mallampati: II  TM Distance: > 6 cm  Neck ROM: normal range of motion   Mouth opening: Normal     Cardiovascular    Rhythm: regular  Rate: normal         Dental    Dentition: Upper dentition intact and Lower dentition intact     Pulmonary  Breath sounds clear to auscultation               Abdominal  GI exam deferred       Other Findings            Anesthetic Plan    ASA: 3  Anesthesia type: total IV anesthesia and MAC          Induction: Intravenous  Anesthetic plan and risks discussed with: Patient

## 2020-09-30 NOTE — DISCHARGE INSTRUCTIONS
Antonia Jaimes .  [id number]  1955    COLON DISCHARGE INSTRUCTIONS  Discomfort:  Redness at IV site- apply warm compress to area; if redness or soreness persist- contact your physician  There may be a slight amount of blood passed from the rectum  Gaseous discomfort- walking, belching will help relieve any discomfort  You may not operate a vehicle for 12 hours  You may not engage in an occupation involving machinery or appliances for rest of today  You may not drink alcoholic beverages for at least 12 hours  Avoid making any critical decisions for at least 24 hour  DIET:   Regular diet. - however -  remember your colon is empty and a heavy meal will produce gas. Avoid these foods:  vegetables, fried / greasy foods, carbonated drinks for today. MEDICATIONS:        Regarding Aspirin or Nonsteroidal medications, please see below. ACTIVITY:  You may resume your normal daily activities it is recommended that you spend the remainder of the day resting -  avoid any strenuous activity. CALL M.D. ANY SIGN OF:  Increasing pain, nausea, vomiting  Abdominal distension (swelling)  New increased bleeding (oral or rectal)  Fever (chills)  Pain in chest area  Bloody discharge from nose or mouth  Shortness of breath    ONLY  Tylenol as needed for pain.       Follow-up Instructions:   Call Dr. Monique Sanchez for results of procedure / biopsy in 4-5 days at telephone #  [phone]              Repeat Colonoscopy in 5 years

## 2020-09-30 NOTE — H&P
Gastroenterology Outpatient History and Physical    Patient: Bassam Tomlinson     Physician: Kt Lofton MD    Vital Signs: Blood pressure (!) 155/98, pulse 74, temperature 97.6 °F (36.4 °C), resp. rate 21, height 6' 1\" (1.854 m), weight 118.3 kg (260 lb 11.2 oz), SpO2 98 %. Allergies: No Known Allergies    Chief Complaint: Polyps    History of Present Illness: 78 yo BM for h/o polyps. Justification for Procedure: above    History:  Past Medical History:   Diagnosis Date    BPH (benign prostatic hyperplasia)     Gout     Hamstring muscle strain 2019    Hypercholesterolemia     Hypertension     IGT (impaired glucose tolerance) 2018    Prostatism     S/P colonoscopic polypectomy 2017    colon polyp Kt Lofton MD    Sleep apnea     uses CPAP    Trigger thumb of right hand 2019      Past Surgical History:   Procedure Laterality Date    COLONOSCOPY N/A 2017    COLONOSCOPY performed by Kt Lofton MD at hospitals ENDOSCOPY    HX COLONOSCOPY      HX OTHER SURGICAL      ingrown toenail repair      Social History     Socioeconomic History    Marital status:      Spouse name: Not on file    Number of children: Not on file    Years of education: Not on file    Highest education level: Not on file   Tobacco Use    Smoking status: Never Smoker    Smokeless tobacco: Never Used   Substance and Sexual Activity    Alcohol use: No    Drug use: No    Sexual activity: Yes     Partners: Female   Social History Narrative    Habits:  He is a lifetime nonsmoker, non drinker, non drug abuser.         Social History:  The patient is , lives with his wife. 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 No grandchildren yet. He is a member of [employer].         Family History:  Father  76 of lung cancer. He was a cigarette smoker. Mother  68 of cancer. He has ten siblings. One brother , which he thinks he was murdered, but the official cause was he jumped off a bridge and they think he was also doing drugs      Family History   Problem Relation Age of Onset    Cancer Mother     Hypertension Mother     Cancer Father        Medications:   Prior to Admission medications    Medication Sig Start Date End Date Taking? Authorizing Provider   colchicine 0.6 mg tablet Take 1 Tab by mouth daily. Patient taking differently: Take 0.6 mg by mouth as needed. 20  Yes HONG Alvarado   emollient topical cream Apply  to affected area as needed for Dry Skin. 1/15/20  Yes Nilsa Thomason MD   tamsulosin (FLOMAX) 0.4 mg capsule Take 1 Cap by mouth daily. 1/15/20  Yes Nilsa Thomason MD   finasteride (PROSCAR) 5 mg tablet TAKE 1 TABLET BY MOUTH EVERY DAY  Patient taking differently: TAKE 1 TABLET BY MOUTH EVERY DAY around 1 pm 1/15/20  Yes Nilsa Thomason MD   atenoloL (TENORMIN) 50 mg tablet Take 1 Tab by mouth nightly. Patient taking differently: Take 50 mg by mouth daily. 1/15/20  Yes Nilsa Thomason MD   amLODIPine (NORVASC) 5 mg tablet TAKE 1 TABLET DAILY 1/15/20  Yes Nilsa Thomason MD   inulin (FIBER CHOICE, INULIN,) 1.5 gram chew Take 2 Tabs by mouth daily. 18  Yes Nilsa Thomason MD   cholecalciferol, vitamin D3, (VITAMIN D3) 2,000 unit tab Take 1 Tab by mouth daily. 18  Yes Nilsa Thomason MD   ascorbic acid, vitamin C, (VITAMIN C) 500 mg tablet Take 2 Tabs by mouth daily. 18  Yes Nilsa Thomason MD   emollient topical cream Apply  to affected area as needed. 18  Yes Nilsa Thomason MD   metFORMIN ER (GLUCOPHAGE XR) 500 mg tablet Take 1 Tab by mouth daily (with dinner). 20   Russel Olvera MD   triamcinolone acetonide (KENALOG) 0.1 % topical cream Apply  to affected area two (2) times a day.  3/6/19   Nilsa Thomason MD       Physical Exam:   General: alert, no distress   HEENT: Head: Normocephalic, no lesions, without obvious abnormality.    Heart: regular rate and rhythm, S1, S2 normal, no murmur, click, rub or gallop   Lungs: chest clear, no wheezing, rales, normal symmetric air entry   Abdominal: soft, NT/ND    Neurological: Grossly normal   Extremities: extremities normal, atraumatic, no cyanosis or edema     Findings/Diagnosis: H/O Polyps    Plan of Care/Planned Procedure: Colonoscopy

## 2020-09-30 NOTE — PROCEDURES
Colonoscopy Procedure Note    Indications:   Personal history of colon polyps (screening only)    Referring Physician: Lucinda Araiza MD  Anesthesia/Sedation: MAC anesthesia Propofol  Endoscopist:  Dr. Lillian Ferguson    Procedure in Detail:  Informed consent was obtained for the procedure, including sedation. Risks of perforation, hemorrhage, adverse drug reaction, and aspiration were discussed. The patient was placed in the left lateral decubitus position. Based on the pre-procedure assessment, including review of the patient's medical history, medications, allergies, and review of systems, he had been deemed to be an appropriate candidate for moderate sedation; he was therefore sedated with the medications listed above. The patient was monitored continuously with ECG tracing, pulse oximetry, blood pressure monitoring, and direct observations. A rectal examination was performed. The [id number] was inserted into the rectum and advanced under direct vision to the cecum, which was identified by the ileocecal valve and appendiceal orifice. The quality of the colonic preparation was adequate. A careful inspection was made as the colonoscope was withdrawn, including a retroflexed view of the rectum; findings and interventions are described below. Appropriate photodocumentation was obtained. Findings:     1. Scope advanced to the cecum. 2.  Preparation was adequate. 3.  Sessile 5 mm polyp in the descending colon s/p cold snare removal.       Small sessile 3 mm polyp in rectum s/p cold snare removal.  4.  Sigmoid diverticulosis. Therapies:  See above    Specimen: Specimens were collected as described above and sent to pathology. Complications: None were encountered during the procedure. EBL: < 10 ml.     Recommendations:   -repeat Colonoscopy in 5 years    Signed By: Radha Mayberry MD                        September 30, 2020

## 2020-10-02 PROBLEM — Z98.890 S/P COLONOSCOPY WITH POLYPECTOMY: Status: ACTIVE | Noted: 2020-10-01

## 2021-01-13 ENCOUNTER — OFFICE VISIT (OUTPATIENT)
Dept: INTERNAL MEDICINE CLINIC | Age: 66
End: 2021-01-13
Payer: MEDICARE

## 2021-01-13 DIAGNOSIS — E11.21 TYPE 2 DIABETES WITH NEPHROPATHY (HCC): Primary | ICD-10-CM

## 2021-01-13 DIAGNOSIS — G47.33 OBSTRUCTIVE SLEEP APNEA: ICD-10-CM

## 2021-01-13 DIAGNOSIS — I10 ESSENTIAL HYPERTENSION: ICD-10-CM

## 2021-01-13 DIAGNOSIS — Z98.890 S/P COLONOSCOPIC POLYPECTOMY: ICD-10-CM

## 2021-01-13 DIAGNOSIS — R35.1 NOCTURIA: ICD-10-CM

## 2021-01-13 DIAGNOSIS — E78.1 HYPERTRIGLYCERIDEMIA: ICD-10-CM

## 2021-01-13 DIAGNOSIS — E66.01 SEVERE OBESITY WITH BODY MASS INDEX (BMI) OF 35.0 TO 39.9 WITH SERIOUS COMORBIDITY (HCC): ICD-10-CM

## 2021-01-13 PROCEDURE — G8536 NO DOC ELDER MAL SCRN: HCPCS | Performed by: INTERNAL MEDICINE

## 2021-01-13 PROCEDURE — G8427 DOCREV CUR MEDS BY ELIG CLIN: HCPCS | Performed by: INTERNAL MEDICINE

## 2021-01-13 PROCEDURE — 3017F COLORECTAL CA SCREEN DOC REV: CPT | Performed by: INTERNAL MEDICINE

## 2021-01-13 PROCEDURE — G8417 CALC BMI ABV UP PARAM F/U: HCPCS | Performed by: INTERNAL MEDICINE

## 2021-01-13 PROCEDURE — 1101F PT FALLS ASSESS-DOCD LE1/YR: CPT | Performed by: INTERNAL MEDICINE

## 2021-01-13 PROCEDURE — G8756 NO BP MEASURE DOC: HCPCS | Performed by: INTERNAL MEDICINE

## 2021-01-13 PROCEDURE — 2022F DILAT RTA XM EVC RTNOPTHY: CPT | Performed by: INTERNAL MEDICINE

## 2021-01-13 PROCEDURE — 99214 OFFICE O/P EST MOD 30 MIN: CPT | Performed by: INTERNAL MEDICINE

## 2021-01-13 PROCEDURE — G8510 SCR DEP NEG, NO PLAN REQD: HCPCS | Performed by: INTERNAL MEDICINE

## 2021-01-13 PROCEDURE — 3046F HEMOGLOBIN A1C LEVEL >9.0%: CPT | Performed by: INTERNAL MEDICINE

## 2021-01-13 PROCEDURE — 36415 COLL VENOUS BLD VENIPUNCTURE: CPT | Performed by: INTERNAL MEDICINE

## 2021-01-13 RX ORDER — AMLODIPINE BESYLATE 5 MG/1
TABLET ORAL
Qty: 90 TAB | Refills: 3 | Status: SHIPPED | OUTPATIENT
Start: 2021-01-13 | End: 2021-07-21 | Stop reason: SDUPTHER

## 2021-01-13 RX ORDER — COLCHICINE 0.6 MG/1
TABLET ORAL
Qty: 30 TAB | Refills: 11 | Status: SHIPPED | OUTPATIENT
Start: 2021-01-13 | End: 2021-07-21 | Stop reason: SDUPTHER

## 2021-01-13 RX ORDER — ATENOLOL 50 MG/1
50 TABLET ORAL
Qty: 90 TAB | Refills: 3 | Status: SHIPPED | OUTPATIENT
Start: 2021-01-13 | End: 2021-07-21 | Stop reason: SDUPTHER

## 2021-01-13 RX ORDER — EMOLLIENT BASE
CREAM (GRAM) TOPICAL 3 TIMES DAILY
Qty: 453 G | Refills: 11 | Status: SHIPPED | OUTPATIENT
Start: 2021-01-13 | End: 2022-06-29

## 2021-01-13 RX ORDER — TAMSULOSIN HYDROCHLORIDE 0.4 MG/1
0.4 CAPSULE ORAL DAILY
Qty: 90 CAP | Refills: 3 | Status: SHIPPED | OUTPATIENT
Start: 2021-01-13 | End: 2021-07-21 | Stop reason: SDUPTHER

## 2021-01-13 RX ORDER — FINASTERIDE 5 MG/1
TABLET, FILM COATED ORAL
Qty: 90 TAB | Refills: 4 | Status: SHIPPED | OUTPATIENT
Start: 2021-01-13 | End: 2021-07-21 | Stop reason: SDUPTHER

## 2021-01-13 NOTE — PROGRESS NOTES
[facility]  Nancy Mon MD, [address] [address]  Phone:  [phone]  Fax: [phone]       Chief Complaint   Patient presents with    Hypertension   . SUBJECTIVE:    Raimundo Liu Sr. is a 77 y.o. male Patient returns today with a known history of obesity, type 2 diabetes with nephropathy, status post colonoscopic polypectomy by Yodit Crocker with the finding of a tubular adenoma, to repeat that in five years, obstructive sleep apnea, primary hypertension, dyslipidemia and is seen for evaluation. Patient returns today concerned about pruritus on the soles of his feet that may be related to the soles, etc.  He has an antiitch cream is he using that seems to help. Patient is seen for evaluation and needs medications refilled. Current Outpatient Medications   Medication Sig Dispense Refill    colchicine 0.6 mg tablet Take 2 tablets at the onset of your gout and may repeat in 4 hours with 1 tablet then 1 tablet daily as needed 30 Tab 11    tamsulosin (FLOMAX) 0.4 mg capsule Take 1 Cap by mouth daily. 90 Cap 3    finasteride (PROSCAR) 5 mg tablet TAKE 1 TABLET BY MOUTH EVERY DAY around 1 pm 90 Tab 4    atenoloL (TENORMIN) 50 mg tablet Take 1 Tab by mouth nightly. 90 Tab 3    amLODIPine (NORVASC) 5 mg tablet TAKE 1 TABLET DAILY 90 Tab 3    emollient topical cream Apply  to affected area three (3) times daily.  453 g 11     Past Medical History:   Diagnosis Date    BPH (benign prostatic hyperplasia)     Gout     Hamstring muscle strain 03/06/2019    Hypercholesterolemia     Hypertension     IGT (impaired glucose tolerance) 08/01/2018    Prostatism     S/P colonoscopic polypectomy 05/31/2017    colon polyp Aly Bernard MD    S/P colonoscopy with polypectomy 10/01/2020    Esteban Lainez md tubular adenoma - repeat in  5 yrs    Sleep apnea     uses CPAP    Trigger thumb of right hand 07/17/2019     Past Surgical History:   Procedure Laterality Date    COLONOSCOPY N/A 2017    COL[facility]    COLONOSCOPY N/A 2020    COLONOSCOPY performed by Wilfredo Danielle [facility]    HX COLONOSCOPY      HX OTHER SURGICAL      ingrown toenail repair     No Known Allergies      REVIEW OF SYSTEMS:  General: negative for - chills or fever  ENT: negative for - headaches, nasal congestion or tinnitus  Respiratory: negative for - cough, hemoptysis, shortness of breath or wheezing  Cardiovascular : negative for - chest pain, edema, palpitations or shortness of breath  Gastrointestinal: negative for - abdominal pain, blood in stools, heartburn or nausea/vomiting  Genito-Urinary: no dysuria, trouble voiding, or hematuria  Musculoskeletal: negative for - gait disturbance, joint pain, joint stiffness or joint swelling  Neurological: no TIA or stroke symptoms  Hematologic: no bruises, no bleeding, no swollen glands  Integument: no lumps, mole changes, nail changes or rash  Endocrine: no malaise/lethargy or unexpected weight changes      Social History     Socioeconomic History    Marital status:      Spouse name: Not on file    Number of children: Not on file    Years of education: Not on file    Highest education level: Not on file   Tobacco Use    Smoking status: Never Smoker    Smokeless tobacco: Never Used   Substance and Sexual Activity    Alcohol use: No    Drug use: No    Sexual activity: Yes     Partners: Female   Social History Narrative    Habits:  He is a lifetime nonsmoker, non drinker, non drug abuser.         Social History:  The patient is , lives with his wife. 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 No grandchildren yet. He is a member of [employer].         Family History:  Father  76 of lung cancer. He was a cigarette smoker.   Mother  68 of cancer. He has ten siblings. One brother , which he thinks he was murdered, but the official cause was he jumped off a bridge and they think he was also doing drugs     Family History   Problem Relation Age of Onset    Cancer Mother     Hypertension Mother     Cancer Father        OBJECTIVE:    There were no vitals taken for this visit. CONSTITUTIONAL: well , well nourished, appears age appropriate  EYES: perrla, eom intact  ENMT:moist mucous membranes, pharynx clear  NECK: supple. Thyroid normal  RESPIRATORY: Chest: clear bilaterally   CARDIOVASCULAR: Heart: regular rate and rhythm  GASTROINTESTINAL: Abdomen: soft, bowel sounds active  HEMATOLOGIC: no pathological lymph nodes palpated  MUSCULOSKELETAL: Extremities: no edema, pulse 1+   INTEGUMENT: No unusual rashes or suspicious skin lesions noted. Nails appear normal.  NEUROLOGIC: non-focal exam   MENTAL STATUS: alert and oriented, appropriate affect           ASSESSMENT:  1. Type 2 diabetes with nephropathy ([facility] [address]    2. Severe obesity with body mass index (BMI) of 35.0 to 39.9 with serious comorbidity ([facility] [address]    3. S/P colonoscopic polypectomy    4. Obstructive sleep apnea    5. Essential hypertension    6. Hypertriglyceridemia    7. Nocturia       Patient's medical status is stable. Blood sugar control will be checked with hemoglobin A1c. He is not currently taking any medications and this may be an error. I think he has impaired glucose tolerance. He is above his ideal body weight and we encourage a heart healthy, weight reducing diet and physical activity for 30 minutes five days a week. He had his colonoscopy performed back in May that was remarkable for a tubular adenoma and his gastroenterologist will do a repeat colonoscopy in five years. He has obstructive sleep apnea, and he states he uses CPAP religiously for 7-9 hours and gets a good night's sleep.     Blood pressure control is excellent and at goal.    There is a history of hypertriglyceridemia. We will check his lipid panel today. He will be back to see us in six months, sooner if he has any problems. I have discussed the diagnosis with the patient and the intended plan as seen in the  Orders. The patient understands and agees with the plan. The patient has   received an after visit summary and questions were answered concerning  future plans  Patient labs and/or xrays were reviewed  Past records were reviewed. PLAN:  .  Orders Placed This Encounter    RENAL FUNCTION PANEL    PROSTATE SPECIFIC AG    LIPID PANEL    colchicine 0.6 mg tablet    tamsulosin (FLOMAX) 0.4 mg capsule    finasteride (PROSCAR) 5 mg tablet    atenoloL (TENORMIN) 50 mg tablet    amLODIPine (NORVASC) 5 mg tablet    emollient topical cream       Follow-up and Dispositions    · Return in about 6 months (around 7/13/2021). ATTENTION:   This medical record was transcribed using an electronic medical records system. Although proofread, it may and can contain electronic and spelling errors. Other human spelling and other errors may be present. Corrections may be executed at a later time. Please feel free to contact us for any clarifications as needed.

## 2021-01-13 NOTE — PROGRESS NOTES
1. Have you been to the ER, urgent care clinic since your last visit? Hospitalized since your last visit? No    2. Have you seen or consulted any other health care providers outside of the [address] [place] [address] since your last visit? Include any pap smears or colon screening.  No     Needs medication refill written script

## 2021-01-14 LAB
ALBUMIN SERPL-MCNC: 4 G/DL (ref 3.5–5)
ANION GAP SERPL CALC-SCNC: 2 MMOL/L (ref 5–15)
BUN SERPL-MCNC: 8 MG/DL (ref 6–20)
BUN/CREAT SERPL: 7 (ref 12–20)
CALCIUM SERPL-MCNC: 9.3 MG/DL (ref 8.5–10.1)
CHLORIDE SERPL-SCNC: 106 MMOL/L (ref 97–108)
CHOLEST SERPL-MCNC: 164 MG/DL
CO2 SERPL-SCNC: 30 MMOL/L (ref 21–32)
CREAT SERPL-MCNC: 1.23 MG/DL (ref 0.7–1.3)
GLUCOSE SERPL-MCNC: 97 MG/DL (ref 65–100)
HDLC SERPL-MCNC: 42 MG/DL
HDLC SERPL: 3.9 {RATIO} (ref 0–5)
LDLC SERPL CALC-MCNC: ABNORMAL MG/DL (ref 0–100)
LDLC SERPL DIRECT ASSAY-MCNC: 69 MG/DL (ref 0–100)
LIPID PROFILE,FLP: ABNORMAL
PHOSPHATE SERPL-MCNC: 2.8 MG/DL (ref 2.6–4.7)
POTASSIUM SERPL-SCNC: 4.3 MMOL/L (ref 3.5–5.1)
PSA SERPL-MCNC: 0.2 NG/ML (ref 0.01–4)
SODIUM SERPL-SCNC: 138 MMOL/L (ref 136–145)
TRIGL SERPL-MCNC: 466 MG/DL (ref ?–150)
VLDLC SERPL CALC-MCNC: ABNORMAL MG/DL

## 2021-07-21 ENCOUNTER — OFFICE VISIT (OUTPATIENT)
Dept: INTERNAL MEDICINE CLINIC | Age: 66
End: 2021-07-21
Payer: MEDICARE

## 2021-07-21 VITALS
WEIGHT: 267.1 LBS | DIASTOLIC BLOOD PRESSURE: 76 MMHG | HEIGHT: 73 IN | HEART RATE: 65 BPM | TEMPERATURE: 98.3 F | RESPIRATION RATE: 18 BRPM | OXYGEN SATURATION: 97 % | BODY MASS INDEX: 35.4 KG/M2 | SYSTOLIC BLOOD PRESSURE: 130 MMHG

## 2021-07-21 DIAGNOSIS — I10 ESSENTIAL HYPERTENSION: ICD-10-CM

## 2021-07-21 DIAGNOSIS — E11.21 TYPE 2 DIABETES WITH NEPHROPATHY (HCC): ICD-10-CM

## 2021-07-21 DIAGNOSIS — Z13.1 SCREENING FOR DIABETES MELLITUS: ICD-10-CM

## 2021-07-21 DIAGNOSIS — E66.01 SEVERE OBESITY WITH BODY MASS INDEX (BMI) OF 35.0 TO 39.9 WITH SERIOUS COMORBIDITY (HCC): ICD-10-CM

## 2021-07-21 DIAGNOSIS — Z00.00 MEDICARE ANNUAL WELLNESS VISIT, SUBSEQUENT: Primary | ICD-10-CM

## 2021-07-21 DIAGNOSIS — E78.1 HYPERTRIGLYCERIDEMIA: ICD-10-CM

## 2021-07-21 PROBLEM — M54.16 LUMBAR RADICULOPATHY: Status: ACTIVE | Noted: 2021-07-21

## 2021-07-21 PROCEDURE — 99213 OFFICE O/P EST LOW 20 MIN: CPT | Performed by: INTERNAL MEDICINE

## 2021-07-21 PROCEDURE — G8536 NO DOC ELDER MAL SCRN: HCPCS | Performed by: INTERNAL MEDICINE

## 2021-07-21 PROCEDURE — G8417 CALC BMI ABV UP PARAM F/U: HCPCS | Performed by: INTERNAL MEDICINE

## 2021-07-21 PROCEDURE — 3017F COLORECTAL CA SCREEN DOC REV: CPT | Performed by: INTERNAL MEDICINE

## 2021-07-21 PROCEDURE — 1101F PT FALLS ASSESS-DOCD LE1/YR: CPT | Performed by: INTERNAL MEDICINE

## 2021-07-21 PROCEDURE — 3046F HEMOGLOBIN A1C LEVEL >9.0%: CPT | Performed by: INTERNAL MEDICINE

## 2021-07-21 PROCEDURE — G8427 DOCREV CUR MEDS BY ELIG CLIN: HCPCS | Performed by: INTERNAL MEDICINE

## 2021-07-21 PROCEDURE — G8752 SYS BP LESS 140: HCPCS | Performed by: INTERNAL MEDICINE

## 2021-07-21 PROCEDURE — G8754 DIAS BP LESS 90: HCPCS | Performed by: INTERNAL MEDICINE

## 2021-07-21 PROCEDURE — G8510 SCR DEP NEG, NO PLAN REQD: HCPCS | Performed by: INTERNAL MEDICINE

## 2021-07-21 PROCEDURE — 2022F DILAT RTA XM EVC RTNOPTHY: CPT | Performed by: INTERNAL MEDICINE

## 2021-07-21 PROCEDURE — G0439 PPPS, SUBSEQ VISIT: HCPCS | Performed by: INTERNAL MEDICINE

## 2021-07-21 RX ORDER — ATENOLOL 50 MG/1
50 TABLET ORAL
Qty: 90 TABLET | Refills: 3 | Status: SHIPPED | OUTPATIENT
Start: 2021-07-21 | End: 2021-11-26 | Stop reason: SDUPTHER

## 2021-07-21 RX ORDER — TAMSULOSIN HYDROCHLORIDE 0.4 MG/1
0.4 CAPSULE ORAL DAILY
Qty: 90 CAPSULE | Refills: 3 | Status: SHIPPED | OUTPATIENT
Start: 2021-07-21 | End: 2022-06-29 | Stop reason: SDUPTHER

## 2021-07-21 RX ORDER — PREDNISONE 20 MG/1
60 TABLET ORAL
Qty: 21 TABLET | Refills: 0 | Status: SHIPPED | OUTPATIENT
Start: 2021-07-21 | End: 2022-06-29 | Stop reason: ALTCHOICE

## 2021-07-21 RX ORDER — COLCHICINE 0.6 MG/1
TABLET ORAL
Qty: 30 TABLET | Refills: 11 | Status: SHIPPED | OUTPATIENT
Start: 2021-07-21 | End: 2022-06-29 | Stop reason: SDUPTHER

## 2021-07-21 RX ORDER — CLOTRIMAZOLE AND BETAMETHASONE DIPROPIONATE 10; .64 MG/G; MG/G
CREAM TOPICAL 2 TIMES DAILY
Qty: 45 G | Refills: 11 | Status: SHIPPED | OUTPATIENT
Start: 2021-07-21

## 2021-07-21 RX ORDER — FINASTERIDE 5 MG/1
5 TABLET, FILM COATED ORAL DAILY
Qty: 90 TABLET | Refills: 4 | Status: SHIPPED | OUTPATIENT
Start: 2021-07-21 | End: 2022-06-29 | Stop reason: SDUPTHER

## 2021-07-21 RX ORDER — AMLODIPINE BESYLATE 5 MG/1
5 TABLET ORAL DAILY
Qty: 90 TABLET | Refills: 3 | Status: SHIPPED | OUTPATIENT
Start: 2021-07-21 | End: 2022-06-29 | Stop reason: SDUPTHER

## 2021-07-21 NOTE — PATIENT INSTRUCTIONS
Medicare Wellness Visit, Male    The best way to live healthy is to have a lifestyle where you eat a well-balanced diet, exercise regularly, limit alcohol use, and quit all forms of tobacco/nicotine, if applicable. Regular preventive services are another way to keep healthy. Preventive services (vaccines, screening tests, monitoring & exams) can help personalize your care plan, which helps you manage your own care. Screening tests can find health problems at the earliest stages, when they are easiest to treat. [facility] follows the current, evidence-based guidelines published by the [facility] ([facility]) when recommending preventive services for our patients. Because we follow these guidelines, sometimes recommendations change over time as research supports it. (For example, a prostate screening blood test is no longer routinely recommended for men with no symptoms). Of course, you and your doctor may decide to screen more often for some diseases, based on your risk and co-morbidities (chronic disease you are already diagnosed with). Preventive services for you include:  - Medicare offers their members a free annual wellness visit, which is time for you and your primary care provider to discuss and plan for your preventive service needs. Take advantage of this benefit every year!  -All adults over age 72 should receive the recommended pneumonia vaccines. Current USPSTF guidelines recommend a series of two vaccines for the best pneumonia protection.   -All adults should have a flu vaccine yearly and tetanus vaccine every 10 years.  -All adults age 48 and older should receive the shingles vaccines (series of two vaccines).        -All adults age 38-68 who are overweight should have a diabetes screening test once every three years.   -Other screening tests & preventive services for persons with diabetes include: an eye exam to screen for diabetic retinopathy, a kidney function test, a foot exam, and stricter control over your cholesterol.   -Cardiovascular screening for adults with routine risk involves an electrocardiogram (ECG) at intervals determined by the provider.   -Colorectal cancer screening should be done for adults age 54-65 with no increased risk factors for colorectal cancer. There are a number of acceptable methods of screening for this type of cancer. Each test has its own benefits and drawbacks. Discuss with your provider what is most appropriate for you during your annual wellness visit. The different tests include: colonoscopy (considered the best screening method), a fecal occult blood test, a fecal DNA test, and sigmoidoscopy.  -All adults born between [facility] should be screened once for Hepatitis C.  -An Abdominal Aortic Aneurysm (AAA) Screening is recommended for men age 73-68 who has ever smoked in their lifetime.      Here is a list of your current Health Maintenance items (your personalized list of preventive services) with a due date:  Health Maintenance Due   Topic Date Due    Eye Exam  Never done    COVID-19 Vaccine (1) Never done    DTaP/Tdap/Td  (1 - Tdap) Never done    Shingles Vaccine (1 of 2) Never done    Pneumococcal Vaccine (2 of 2 - PPSV23) 11/07/2020    Diabetic Foot Care  01/15/2021    Hemoglobin A1C    01/15/2021    Albumin Urine Test  01/15/2021

## 2021-07-21 NOTE — PROGRESS NOTES
SPORTS MEDICINE AND PRIMARY CARE  Kendrick Galindo MD, [address] [address]  Phone:  [phone]  Fax: [phone]      Chief Complaint   Patient presents with    Hypertension         SUBECTIVE:    Ritesh Campbell Sr. is a 74 y.o. male Patient returns today and since we last saw him, he underwent colonoscopy by Osman Abdalla MD with the finding of descending colon polyp and diverticulosis, as well as a rectal polyp. He recommended repeat colonoscopy in five years. Other medical problems include type 2 diabetes with nephropathy, obesity, hypertriglyceridemia, primary hypertension, and is seen for evaluation. Particularly when walking behind the , if he is walking around the store, he is having lumbar discomfort with radiation down his left leg. Back on 03/11/18 he had an x-ray of the lumbar spine, which revealed spondylolisthesis at L4-5, but also disc height at L5-S1. Current Outpatient Medications   Medication Sig Dispense Refill    predniSONE (DELTASONE) 20 mg tablet Take 60 mg by mouth daily (with breakfast). 21 Tablet 0    colchicine 0.6 mg tablet Take 2 tablets at the onset of your gout and may repeat in 4 hours with 1 tablet then 1 tablet daily as needed 30 Tablet 11    tamsulosin (FLOMAX) 0.4 mg capsule Take 1 Capsule by mouth daily. 90 Capsule 3    finasteride (PROSCAR) 5 mg tablet Take 1 Tablet by mouth daily. TAKE 1 TABLET BY MOUTH EVERY DAY around 1 pm 90 Tablet 4    atenoloL (TENORMIN) 50 mg tablet Take 1 Tablet by mouth nightly. 90 Tablet 3    amLODIPine (NORVASC) 5 mg tablet Take 1 Tablet by mouth daily. TAKE 1 TABLET DAILY 90 Tablet 3    clotrimazole-betamethasone (LOTRISONE) topical cream Apply  to affected area two (2) times a day. 45 g 11    emollient topical cream Apply  to affected area three (3) times daily.  453 g 11     Past Medical History:   Diagnosis Date    BPH (benign prostatic hyperplasia)     Gout     Hamstring muscle strain 2019    Hypercholesterolemia     Hypertension     IGT (impaired glucose tolerance) 2018    Prostatism     S/P colonoscopic polypectomy 2017    colon polyp Gisselle Rodriguez MD    S/P colonoscopy with polypectomy 10/01/2020    Esteban Brown md tubular adenoma - repeat in  5 yrs    Sleep apnea     uses CPAP    Trigger thumb of right hand 2019     Past Surgical History:   Procedure Laterality Date    COLONOSCOPY N/A 2017    COLONOSCOPY performed by Gisselle Rodriguez MD at hospitals ENDOSCOPY    COLONOSCOPY N/A 2020    COLONOSCOPY performed by China Garcia MD at hospitals ENDOSCOPY    HX COLONOSCOPY      HX OTHER SURGICAL      ingrown toenail repair     No Known Allergies    REVIEW OF SYSTEMS:   No chest pain, no shortness of breath. Social History     Socioeconomic History    Marital status:      Spouse name: Not on file    Number of children: Not on file    Years of education: Not on file    Highest education level: Not on file   Tobacco Use    Smoking status: Never Smoker    Smokeless tobacco: Never Used   Vaping Use    Vaping Use: Never used   Substance and Sexual Activity    Alcohol use: No    Drug use: No    Sexual activity: Yes     Partners: Female   Social History Narrative    Habits:  He is a lifetime nonsmoker, non drinker, non drug abuser.         Social History:  The patient is , lives with his wife. 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 No grandchildren yet. He is a member of [employer].         Family History:  Father  76 of lung cancer. He was a cigarette smoker. Mother  68 of cancer. He has ten siblings.  One brother , which he thinks he was murdered, but the official cause was he jumped off a bridge and they think he was also doing drugs     Social Determinants of Health     Financial Resource Strain:  Difficulty of Paying Living Expenses:    Food Insecurity:     Worried About Running Out of Food in the Last Year:     Ran Out of Food in the Last Year:    Transportation Needs:     Lack of Transportation (Medical):  Lack of Transportation (Non-Medical):    Physical Activity:     Days of Exercise per Week:     Minutes of Exercise per Session:    Stress:     Feeling of Stress :    Social Connections:     Frequency of Communication with Friends and Family:     Frequency of Social Gatherings with Friends and Family:     Attends [employer] Services:     Active Member of Clubs or Organizations:     Attends Club or Organization Meetings:     Marital Status:    r  Family History   Problem Relation Age of Onset    Cancer Mother     Hypertension Mother     Cancer Father        OBJECTIVE:  Visit Vitals  /76   Pulse 65   Temp 98.3 °F (36.8 °C) (Oral)   Resp 18   Ht 6' 1\" (1.854 m)   Wt 267 lb 1.6 oz (121.2 kg)   SpO2 97%   BMI 35.24 kg/m²     ENT: perrla,  eom intact  NECK: supple.  Thyroid normal  CHEST: clear to ascultation and percussion   HEART: regular rate and rhythm  ABD: soft, bowel sounds active  EXTREMITIES: no edema, pulse 1+     Office Visit on 07/21/2021   Component Date Value Ref Range Status    Sodium 07/21/2021 139  136 - 145 mmol/L Final    Potassium 07/21/2021 4.3  3.5 - 5.1 mmol/L Final    Chloride 07/21/2021 106  97 - 108 mmol/L Final    CO2 07/21/2021 28  21 - 32 mmol/L Final    Anion gap 07/21/2021 5  5 - 15 mmol/L Final    Glucose 07/21/2021 102* 65 - 100 mg/dL Final    BUN 07/21/2021 8  6 - 20 MG/DL Final    Creatinine 07/21/2021 1.09  0.70 - 1.30 MG/DL Final    BUN/Creatinine ratio 07/21/2021 7* 12 - 20   Final    GFR est AA 07/21/2021 >60  >60 ml/min/1.73m2 Final    GFR est non-AA 07/21/2021 >60  >60 ml/min/1.73m2 Final    Comment: Estimated GFR is calculated using the IDMS-traceable Modification of Diet in  Renal Disease (MDRD) Study equation, reported for both  Americans  (GFRAA) and non- Americans (GFRNA), and normalized to 1.73m2 body  surface area. The physician must decide which value applies to the patient.  Calcium 07/21/2021 9.4  8.5 - 10.1 MG/DL Final    Phosphorus 07/21/2021 3.0  2.6 - 4.7 MG/DL Final    Albumin 07/21/2021 3.9  3.5 - 5.0 g/dL Final    Hemoglobin A1c 07/21/2021 6.2* 4.0 - 5.6 % Final    Comment: NEW METHOD PLEASE NOTE NEW REFERENCE RANGE  (NOTE)  HbA1C Interpretive Ranges  <5.7              Normal  5.7 - 6.4         Consider Prediabetes  >6.5              Consider Diabetes      Est. average glucose 07/21/2021 131  mg/dL Final          ASSESSMENT:  1. Medicare annual wellness visit, subsequent    2. Screening for diabetes mellitus    3. Type 2 diabetes with nephropathy ([facility]    4. Severe obesity with body mass index (BMI) of 35.0 to 39.9 with serious comorbidity ([facility]    5. Hypertriglyceridemia    6. Essential hypertension      We will assess blood sugar control with a hemoglobin A1c. He has obesity, for which we encourage a heart healthy, weight reducing diet. Triglyceridemia is dietary related. He does not drink alcohol, but it is the sweets and starches he says his wife buys for him that he has to curtail. Blood pressure control is at goal.    He will be back to see me in four months, sooner if he has any problems. Laboratory studies will be checked. For his lumbar radiculopathy, we will give him a seven day course of prednisone, advised him that it will run up his blood sugar, and to that end he will stop the sweets and starches. I have discussed the diagnosis with the patient and the intended plan as seen in the  orders above. The patient understands and agees with the plan. The patient has   received an after visit summary and questions were answered concerning  future plans  Patient labs and/or xrays were reviewed  Past records were reviewed.     PLAN:  .  Orders Placed This Encounter    HEMOGLOBIN A1C WITH EAG    RENAL FUNCTION PANEL    predniSONE (DELTASONE) 20 mg tablet    colchicine 0.6 mg tablet    tamsulosin (FLOMAX) 0.4 mg capsule    finasteride (PROSCAR) 5 mg tablet    atenoloL (TENORMIN) 50 mg tablet    amLODIPine (NORVASC) 5 mg tablet    clotrimazole-betamethasone (LOTRISONE) topical cream       Follow-up and Dispositions    · Return in about 4 months (around 11/21/2021). Follow-up and Disposition History                    ATTENTION:   This medical record was transcribed using an electronic medical records system. Although proofread, it may and can contain electronic and spelling errors. Other human spelling and other errors may be present. Corrections may be executed at a later time. Please feel free to contact us for any clarifications as needed.

## 2021-07-22 LAB
ALBUMIN SERPL-MCNC: 3.9 G/DL (ref 3.5–5)
ANION GAP SERPL CALC-SCNC: 5 MMOL/L (ref 5–15)
BUN SERPL-MCNC: 8 MG/DL (ref 6–20)
BUN/CREAT SERPL: 7 (ref 12–20)
CALCIUM SERPL-MCNC: 9.4 MG/DL (ref 8.5–10.1)
CHLORIDE SERPL-SCNC: 106 MMOL/L (ref 97–108)
CO2 SERPL-SCNC: 28 MMOL/L (ref 21–32)
CREAT SERPL-MCNC: 1.09 MG/DL (ref 0.7–1.3)
EST. AVERAGE GLUCOSE BLD GHB EST-MCNC: 131 MG/DL
GLUCOSE SERPL-MCNC: 102 MG/DL (ref 65–100)
HBA1C MFR BLD: 6.2 % (ref 4–5.6)
PHOSPHATE SERPL-MCNC: 3 MG/DL (ref 2.6–4.7)
POTASSIUM SERPL-SCNC: 4.3 MMOL/L (ref 3.5–5.1)
SODIUM SERPL-SCNC: 139 MMOL/L (ref 136–145)

## 2021-11-26 RX ORDER — ATENOLOL 50 MG/1
50 TABLET ORAL
Qty: 90 TABLET | Refills: 3 | Status: SHIPPED | OUTPATIENT
Start: 2021-11-26 | End: 2022-09-07 | Stop reason: SDUPTHER

## 2022-03-18 PROBLEM — Z98.890 S/P COLONOSCOPIC POLYPECTOMY: Status: ACTIVE | Noted: 2017-05-31

## 2022-03-18 PROBLEM — S76.319A HAMSTRING MUSCLE STRAIN: Status: ACTIVE | Noted: 2019-03-06

## 2022-03-19 PROBLEM — E11.21 TYPE 2 DIABETES WITH NEPHROPATHY (HCC): Status: ACTIVE | Noted: 2018-11-28

## 2022-03-19 PROBLEM — Z98.890 S/P COLONOSCOPY WITH POLYPECTOMY: Status: ACTIVE | Noted: 2020-10-01

## 2022-03-19 PROBLEM — M54.16 LUMBAR RADICULOPATHY: Status: ACTIVE | Noted: 2021-07-21

## 2022-03-19 PROBLEM — M65.311 TRIGGER THUMB OF RIGHT HAND: Status: ACTIVE | Noted: 2019-07-17

## 2022-03-20 PROBLEM — E66.01 SEVERE OBESITY WITH BODY MASS INDEX (BMI) OF 35.0 TO 39.9 WITH SERIOUS COMORBIDITY (HCC): Status: ACTIVE | Noted: 2018-08-01

## 2022-06-29 ENCOUNTER — OFFICE VISIT (OUTPATIENT)
Dept: INTERNAL MEDICINE CLINIC | Age: 67
End: 2022-06-29
Payer: MEDICARE

## 2022-06-29 VITALS
HEIGHT: 73 IN | WEIGHT: 262.8 LBS | TEMPERATURE: 98 F | RESPIRATION RATE: 18 BRPM | DIASTOLIC BLOOD PRESSURE: 81 MMHG | SYSTOLIC BLOOD PRESSURE: 138 MMHG | HEART RATE: 59 BPM | OXYGEN SATURATION: 97 % | BODY MASS INDEX: 34.83 KG/M2

## 2022-06-29 DIAGNOSIS — G95.19 NEUROGENIC CLAUDICATION (HCC): ICD-10-CM

## 2022-06-29 DIAGNOSIS — M10.9 GOUT, UNSPECIFIED CAUSE, UNSPECIFIED CHRONICITY, UNSPECIFIED SITE: ICD-10-CM

## 2022-06-29 DIAGNOSIS — E78.1 HYPERTRIGLYCERIDEMIA: ICD-10-CM

## 2022-06-29 DIAGNOSIS — R35.1 NOCTURIA: ICD-10-CM

## 2022-06-29 DIAGNOSIS — I10 PRIMARY HYPERTENSION: Primary | ICD-10-CM

## 2022-06-29 DIAGNOSIS — G47.33 OBSTRUCTIVE SLEEP APNEA: ICD-10-CM

## 2022-06-29 DIAGNOSIS — M54.16 LUMBAR RADICULOPATHY: ICD-10-CM

## 2022-06-29 DIAGNOSIS — E11.21 TYPE 2 DIABETES WITH NEPHROPATHY (HCC): ICD-10-CM

## 2022-06-29 DIAGNOSIS — E66.01 SEVERE OBESITY WITH BODY MASS INDEX (BMI) OF 35.0 TO 39.9 WITH SERIOUS COMORBIDITY (HCC): ICD-10-CM

## 2022-06-29 PROBLEM — R29.818 NEUROGENIC CLAUDICATION: Status: ACTIVE | Noted: 2022-06-29

## 2022-06-29 LAB
ALBUMIN SERPL-MCNC: 4 G/DL (ref 3.5–5)
ALBUMIN/GLOB SERPL: 1.2 {RATIO} (ref 1.1–2.2)
ALP SERPL-CCNC: 76 U/L (ref 45–117)
ALT SERPL-CCNC: 23 U/L (ref 12–78)
ANION GAP SERPL CALC-SCNC: 4 MMOL/L (ref 5–15)
APPEARANCE UR: CLEAR
AST SERPL-CCNC: 15 U/L (ref 15–37)
BACTERIA URNS QL MICRO: NEGATIVE /HPF
BASOPHILS # BLD: 0.1 K/UL (ref 0–0.1)
BASOPHILS NFR BLD: 1 % (ref 0–1)
BILIRUB SERPL-MCNC: 0.9 MG/DL (ref 0.2–1)
BILIRUB UR QL: NEGATIVE
BUN SERPL-MCNC: 9 MG/DL (ref 6–20)
BUN/CREAT SERPL: 7 (ref 12–20)
CALCIUM SERPL-MCNC: 9.3 MG/DL (ref 8.5–10.1)
CHLORIDE SERPL-SCNC: 107 MMOL/L (ref 97–108)
CHOLEST SERPL-MCNC: 136 MG/DL
CO2 SERPL-SCNC: 29 MMOL/L (ref 21–32)
COLOR UR: NORMAL
CREAT SERPL-MCNC: 1.24 MG/DL (ref 0.7–1.3)
CREAT UR-MCNC: 84.2 MG/DL
DIFFERENTIAL METHOD BLD: ABNORMAL
EOSINOPHIL # BLD: 0.2 K/UL (ref 0–0.4)
EOSINOPHIL NFR BLD: 4 % (ref 0–7)
EPITH CASTS URNS QL MICRO: NORMAL /LPF
ERYTHROCYTE [DISTWIDTH] IN BLOOD BY AUTOMATED COUNT: 14.7 % (ref 11.5–14.5)
EST. AVERAGE GLUCOSE BLD GHB EST-MCNC: 128 MG/DL
GLOBULIN SER CALC-MCNC: 3.3 G/DL (ref 2–4)
GLUCOSE SERPL-MCNC: 110 MG/DL (ref 65–100)
GLUCOSE UR STRIP.AUTO-MCNC: NEGATIVE MG/DL
HBA1C MFR BLD: 6.1 % (ref 4–5.6)
HCT VFR BLD AUTO: 47.5 % (ref 36.6–50.3)
HDLC SERPL-MCNC: 47 MG/DL
HDLC SERPL: 2.9 {RATIO} (ref 0–5)
HGB BLD-MCNC: 15.1 G/DL (ref 12.1–17)
HGB UR QL STRIP: NEGATIVE
HYALINE CASTS URNS QL MICRO: NORMAL /LPF (ref 0–5)
IMM GRANULOCYTES # BLD AUTO: 0 K/UL (ref 0–0.04)
IMM GRANULOCYTES NFR BLD AUTO: 0 % (ref 0–0.5)
KETONES UR QL STRIP.AUTO: NEGATIVE MG/DL
LDLC SERPL CALC-MCNC: 20.2 MG/DL (ref 0–100)
LEUKOCYTE ESTERASE UR QL STRIP.AUTO: NEGATIVE
LYMPHOCYTES # BLD: 1.5 K/UL (ref 0.8–3.5)
LYMPHOCYTES NFR BLD: 36 % (ref 12–49)
MCH RBC QN AUTO: 28.8 PG (ref 26–34)
MCHC RBC AUTO-ENTMCNC: 31.8 G/DL (ref 30–36.5)
MCV RBC AUTO: 90.5 FL (ref 80–99)
MICROALBUMIN UR-MCNC: 0.95 MG/DL
MICROALBUMIN/CREAT UR-RTO: 11 MG/G (ref 0–30)
MONOCYTES # BLD: 0.5 K/UL (ref 0–1)
MONOCYTES NFR BLD: 12 % (ref 5–13)
NEUTS SEG # BLD: 1.9 K/UL (ref 1.8–8)
NEUTS SEG NFR BLD: 47 % (ref 32–75)
NITRITE UR QL STRIP.AUTO: NEGATIVE
NRBC # BLD: 0 K/UL (ref 0–0.01)
NRBC BLD-RTO: 0 PER 100 WBC
PH UR STRIP: 7 [PH] (ref 5–8)
PLATELET # BLD AUTO: 180 K/UL (ref 150–400)
PMV BLD AUTO: 10.3 FL (ref 8.9–12.9)
POTASSIUM SERPL-SCNC: 4.2 MMOL/L (ref 3.5–5.1)
PROT SERPL-MCNC: 7.3 G/DL (ref 6.4–8.2)
PROT UR STRIP-MCNC: NEGATIVE MG/DL
PSA SERPL-MCNC: 0.2 NG/ML (ref 0.01–4)
RBC # BLD AUTO: 5.25 M/UL (ref 4.1–5.7)
RBC #/AREA URNS HPF: NORMAL /HPF (ref 0–5)
SODIUM SERPL-SCNC: 140 MMOL/L (ref 136–145)
SP GR UR REFRACTOMETRY: 1.01 (ref 1–1.03)
TRIGL SERPL-MCNC: 344 MG/DL (ref ?–150)
UA: UC IF INDICATED,UAUC: NORMAL
URATE SERPL-MCNC: 7.3 MG/DL (ref 3.5–7.2)
UROBILINOGEN UR QL STRIP.AUTO: 0.2 EU/DL (ref 0.2–1)
VLDLC SERPL CALC-MCNC: 68.8 MG/DL
WBC # BLD AUTO: 4.1 K/UL (ref 4.1–11.1)
WBC URNS QL MICRO: NORMAL /HPF (ref 0–4)

## 2022-06-29 PROCEDURE — 99214 OFFICE O/P EST MOD 30 MIN: CPT | Performed by: INTERNAL MEDICINE

## 2022-06-29 PROCEDURE — 72100 X-RAY EXAM L-S SPINE 2/3 VWS: CPT | Performed by: INTERNAL MEDICINE

## 2022-06-29 PROCEDURE — 1101F PT FALLS ASSESS-DOCD LE1/YR: CPT | Performed by: INTERNAL MEDICINE

## 2022-06-29 PROCEDURE — G8752 SYS BP LESS 140: HCPCS | Performed by: INTERNAL MEDICINE

## 2022-06-29 PROCEDURE — 1123F ACP DISCUSS/DSCN MKR DOCD: CPT | Performed by: INTERNAL MEDICINE

## 2022-06-29 PROCEDURE — 3017F COLORECTAL CA SCREEN DOC REV: CPT | Performed by: INTERNAL MEDICINE

## 2022-06-29 PROCEDURE — G8427 DOCREV CUR MEDS BY ELIG CLIN: HCPCS | Performed by: INTERNAL MEDICINE

## 2022-06-29 PROCEDURE — G8417 CALC BMI ABV UP PARAM F/U: HCPCS | Performed by: INTERNAL MEDICINE

## 2022-06-29 PROCEDURE — G8510 SCR DEP NEG, NO PLAN REQD: HCPCS | Performed by: INTERNAL MEDICINE

## 2022-06-29 PROCEDURE — G8536 NO DOC ELDER MAL SCRN: HCPCS | Performed by: INTERNAL MEDICINE

## 2022-06-29 PROCEDURE — 3046F HEMOGLOBIN A1C LEVEL >9.0%: CPT | Performed by: INTERNAL MEDICINE

## 2022-06-29 PROCEDURE — 2022F DILAT RTA XM EVC RTNOPTHY: CPT | Performed by: INTERNAL MEDICINE

## 2022-06-29 PROCEDURE — G8754 DIAS BP LESS 90: HCPCS | Performed by: INTERNAL MEDICINE

## 2022-06-29 RX ORDER — ACETAMINOPHEN 500 MG
2000 TABLET ORAL DAILY
COMMUNITY

## 2022-06-29 RX ORDER — VITAMIN E 268 MG
400 CAPSULE ORAL DAILY
COMMUNITY

## 2022-06-29 RX ORDER — COLCHICINE 0.6 MG/1
TABLET ORAL
Qty: 90 TABLET | Refills: 3 | Status: SHIPPED | OUTPATIENT
Start: 2022-06-29

## 2022-06-29 RX ORDER — CALCIPOTRIENE 50 UG/G
CREAM TOPICAL
COMMUNITY
Start: 2022-04-04

## 2022-06-29 RX ORDER — MENTHOL
1000 GEL (GRAM) TOPICAL DAILY
COMMUNITY

## 2022-06-29 RX ORDER — HALOBETASOL PROPIONATE 0.5 MG/G
CREAM TOPICAL
COMMUNITY
Start: 2022-04-04

## 2022-06-29 RX ORDER — FINASTERIDE 5 MG/1
5 TABLET, FILM COATED ORAL DAILY
Qty: 90 TABLET | Refills: 3 | Status: SHIPPED | OUTPATIENT
Start: 2022-06-29 | End: 2022-11-02 | Stop reason: SDUPTHER

## 2022-06-29 RX ORDER — TAMSULOSIN HYDROCHLORIDE 0.4 MG/1
0.4 CAPSULE ORAL DAILY
Qty: 90 CAPSULE | Refills: 3 | Status: SHIPPED | OUTPATIENT
Start: 2022-06-29

## 2022-06-29 RX ORDER — AMLODIPINE BESYLATE 5 MG/1
5 TABLET ORAL DAILY
Qty: 90 TABLET | Refills: 3 | Status: SHIPPED | OUTPATIENT
Start: 2022-06-29

## 2022-06-29 NOTE — PROGRESS NOTES
Chief Complaint   Patient presents with    Diabetes     routine follow up          1. Have you been to the ER, urgent care clinic since your last visit? Hospitalized since your last visit? No    2. Have you seen or consulted any other health care providers outside of the [address] [place] [address] since your last visit? Include any pap smears or colon screening.  No

## 2022-06-29 NOTE — PROGRESS NOTES
SPORTS MEDICINE AND PRIMARY CARE  Mariana Staples MD, [address] [address]  Phone:  [phone]  Fax: [phone]       Chief Complaint   Patient presents with    Diabetes     routine follow up    . SUBJECTIVE:    Anisa Vergara Sr. is a 69 y.o. male Patient returns today with a history of hypertension, diabetes, hypertriglyceridemia, obstructive sleep apnea, obesity, lumbar radiculopathy, gout and is seen for evaluation. Patient had an episode where after cleaning his ear with Q-tips he had a sensation that night to the point that he could not sleep. He googled it and found that he could drink some water quickly repeatedly and it went away, which is kind of odd for him. Patient states after he stands for ten minutes or so in an area that he notes a dull pain in his left leg. 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 He walks so far and then gets a dull pain and has to stop and it quiets down and he then finishes his duties. Patient is seen for evaluation. Patient had bladder stone removed and noted some hematuria after removal.  It is back to normal now. Current Outpatient Medications   Medication Sig Dispense Refill    dimethicone/zinc oxide (VANICREAM DIAPER RASH EX) APPLY LIBERAL AMOUNT TO AFFECTED AREA TWICE A DAY APPLY TO ALL DRY SKIN AREAS FOR MOISTRURIZATION      calcipotriene (DOVONEX) 0.005 % topical cream       FIBER CHOICE PO Take  by mouth.  cholecalciferol (VITAMIN D3) (2,000 UNITS /50 MCG) cap capsule Take 2,000 Units by mouth daily.  vitamin E (AQUA GEMS) 400 unit capsule Take 400 Units by mouth daily.  vitamin e (E GEMS) 1,000 unit capsule Take 1,000 Units by mouth daily.  amLODIPine (NORVASC) 5 mg tablet Take 1 Tablet by mouth daily. TAKE 1 TABLET DAILY 90 Tablet 3    finasteride (PROSCAR) 5 mg tablet Take 1 Tablet by mouth daily.  TAKE 1 TABLET BY MOUTH EVERY DAY around 1 pm 90 Tablet 3    colchicine 0.6 mg tablet Take 2 tablets at the onset of your gout and may repeat in 4 hours with 1 tablet then 1 tablet daily as needed 90 Tablet 3    tamsulosin (FLOMAX) 0.4 mg capsule Take 1 Capsule by mouth daily. 90 Capsule 3    atenoloL (TENORMIN) 50 mg tablet Take 1 Tablet by mouth nightly. 90 Tablet 3    clotrimazole-betamethasone (LOTRISONE) topical cream Apply  to affected area two (2) times a day.  45 g 11    halobetasol (ULTRAVATE) 0.05 % topical cream        Past Medical History:   Diagnosis Date    BPH (benign prostatic hyperplasia)     Gout     Hamstring muscle strain 03/06/2019    Hypercholesterolemia     Hypertension     IGT (impaired glucose tolerance) 08/01/2018    Neurogenic claudication ([facility] [address] 06/29/2022    Prostatism     S/P colonoscopic polypectomy 05/31/2017    colon polyp Dao Frye MD    S/P colonoscopy with polypectomy 10/01/2020    Esteban Sanchez md tubular adenoma - repeat in  5 yrs    Sleep apnea     uses CPAP    Trigger thumb of right hand 07/17/2019     Past Surgical History:   Procedure Laterality Date    COLONOSCOPY N/A 5/31/2017    COLON[facility]ao [facility]e [facility] at [facility] ENDOSCOPY    COLONOSCOPY N/A 9/30/2020    COLONOSCOPY performed by Jeff Tena MD at [facility] ENDOSCOPY    HX COLONOSCOPY      HX OTHER SURGICAL      ingrown toenail repair     Allergies   Allergen Reactions    Shellfish Containing Products Rash         REVIEW OF SYSTEMS:  General: negative for - chills or fever  ENT: negative for - headaches, nasal congestion or tinnitus  Respiratory: negative for - cough, hemoptysis, shortness of breath or wheezing  Cardiovascular : negative for - chest pain, edema, palpitations or shortness of breath  Gastrointestinal: negative for - abdominal pain, blood in stools, heartburn or nausea/vomiting  Genito-Urinary: no dysuria, trouble voiding, or hematuria  Musculoskeletal: negative for - gait disturbance, joint pain, joint stiffness or joint swelling  Neurological: no TIA or stroke symptoms  Hematologic: no bruises, no bleeding, no swollen glands  Integument: no lumps, mole changes, nail changes or rash  Endocrine: no malaise/lethargy or unexpected weight changes      Social History     Socioeconomic History    Marital status:    Tobacco Use    Smoking status: Never Smoker    Smokeless tobacco: Never Used   Vaping Use    Vaping Use: Never used   Substance and Sexual Activity    Alcohol use: No    Drug use: No    Sexual activity: Yes     Partners: Female   Social History Narrative    Habits:  He is a lifetime nonsmoker, non drinker, non drug abuser.         Social History:  The patient is , lives with his wife. 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 No grandchildren yet. He is a member of [employer].         Family History:  Father  76 of lung cancer. He was a cigarette smoker. Mother  68 of cancer. He has ten siblings. One brother , which he thinks he was murdered, but the official cause was he jumped off a bridge and they think he was also doing drugs     Family History   Problem Relation Age of Onset    Cancer Mother     Hypertension Mother     Cancer Father        OBJECTIVE:    Visit Vitals  /81   Pulse (!) 59   Temp 98 °F (36.7 °C) (Oral)   Resp 18   Ht 6' 1\" (1.854 m)   Wt 262 lb 12.8 oz (119.2 kg)   SpO2 97%   BMI 34.67 kg/m²     CONSTITUTIONAL: well , well nourished, appears age appropriate  EYES: perrla, eom intact  ENMT:moist mucous membranes, pharynx clear  NECK: supple. Thyroid normal  RESPIRATORY: Chest: clear bilaterally   CARDIOVASCULAR: Heart: regular rate and rhythm  GASTROINTESTINAL: Abdomen: soft, bowel sounds active  HEMATOLOGIC: no pathological lymph nodes palpated  MUSCULOSKELETAL: Extremities: no edema, pulse 1+   INTEGUMENT: No unusual rashes or suspicious skin lesions noted.  Nails appear normal.  NEUROLOGIC: non-focal exam   MENTAL STATUS: alert and oriented, appropriate affect           ASSESSMENT:  1. Primary hypertension    2. Type 2 diabetes with nephropathy ([address]    3. Hypertriglyceridemia    4. Obstructive sleep apnea    5. Severe obesity with body mass index (BMI) of 35.0 to 39.9 with serious comorbidity (HCC)    6. Lumbar radiculopathy    7. Gout, unspecified cause, unspecified chronicity, unspecified site    8. Nocturia     9. Neurogenic claudication ([address]      Blood pressure control is at goal, no titration is needed. Type 2 diabetes, for which we will check hemoglobin A1c. He has hypertriglyceridemia, for which we will check lipid panel. He has a CPAP machine for his HOMER. He has obesity, but has lost 5 pounds and we congratulate him. He has lumbar radiculopathy history. I think the discomfort down his leg is related to neurogenic claudication. We will do xray of the lumbar spine to confirm that and if it continues to be bothersome to him, we will ask for MRI of the lumbar spine. History of gout, for which we will check a uric acid. He will be back to see me in four to six months, sooner if needed. I have discussed the diagnosis with the patient and the intended plan as seen in the  Orders. The patient understands and agees with the plan. The patient has   received an after visit summary and questions were answered concerning  future plans  Patient labs and/or xrays were reviewed  Past records were reviewed.     PLAN:  .  Orders Placed This Encounter    XR SPINE LUMB 2 OR 3 V    CBC WITH AUTOMATED DIFF    METABOLIC PANEL, COMPREHENSIVE    LIPID PANEL    PROSTATE SPECIFIC AG    HEMOGLOBIN A1C WITH EAG    URIC ACID    MICROALBUMIN, UR, RAND W/ MICROALB/CREAT RATIO    URINALYSIS W/ REFLEX CULTURE    REFERRAL TO OPHTHALMOLOGY    dimethicone/zinc oxide (VANICREAM DIAPER RASH EX)    halobetasol (ULTRAVATE) 0.05 % topical cream    calcipotriene (DOVONEX) 0.005 % topical cream    FIBER CHOICE PO    cholecalciferol (VITAMIN D3) (2,000 UNITS /50 MCG) cap capsule    vitamin E (AQUA GEMS) 400 unit capsule    vitamin e (E GEMS) 1,000 unit capsule    amLODIPine (NORVASC) 5 mg tablet    finasteride (PROSCAR) 5 mg tablet    colchicine 0.6 mg tablet    tamsulosin (FLOMAX) 0.4 mg capsule       Follow-up and Dispositions    · Return in about 4 months (around 10/29/2022). Follow-up and Disposition History                ATTENTION:   This medical record was transcribed using an electronic medical records system. Although proofread, it may and can contain electronic and spelling errors. Other human spelling and other errors may be present. Corrections may be executed at a later time. Please feel free to contact us for any clarifications as needed.

## 2022-06-30 NOTE — PROGRESS NOTES
Laboratory studies are generally good. Uric acid is slightly elevated. Triglycerides were elevated. They go up with alcohol sweets and starchy foods.   Blood sugar control is excellent

## 2022-09-07 RX ORDER — ATENOLOL 50 MG/1
50 TABLET ORAL
Qty: 90 TABLET | Refills: 3 | Status: SHIPPED | OUTPATIENT
Start: 2022-09-07 | End: 2022-09-07 | Stop reason: SDUPTHER

## 2022-09-07 RX ORDER — ATENOLOL 50 MG/1
50 TABLET ORAL
Qty: 90 TABLET | Refills: 3 | Status: SHIPPED | OUTPATIENT
Start: 2022-09-07

## 2022-09-17 ENCOUNTER — HOSPITAL ENCOUNTER (EMERGENCY)
Age: 67
Discharge: HOME | End: 2022-09-17
Attending: EMERGENCY MEDICINE
Payer: MEDICARE

## 2022-09-17 ENCOUNTER — APPOINTMENT (OUTPATIENT)
Dept: GENERAL RADIOLOGY | Age: 67
End: 2022-09-17
Attending: STUDENT IN AN ORGANIZED HEALTH CARE EDUCATION/TRAINING PROGRAM
Payer: MEDICARE

## 2022-09-17 VITALS
TEMPERATURE: 97.6 F | HEIGHT: 73 IN | OXYGEN SATURATION: 96 % | DIASTOLIC BLOOD PRESSURE: 89 MMHG | SYSTOLIC BLOOD PRESSURE: 144 MMHG | RESPIRATION RATE: 12 BRPM | WEIGHT: 260.14 LBS | BODY MASS INDEX: 34.48 KG/M2 | HEART RATE: 75 BPM

## 2022-09-17 DIAGNOSIS — R07.9 ACUTE CHEST PAIN: Primary | ICD-10-CM

## 2022-09-17 LAB
ALBUMIN SERPL-MCNC: 3.9 G/DL (ref 3.5–5)
ALBUMIN/GLOB SERPL: 1 {RATIO} (ref 1.1–2.2)
ALP SERPL-CCNC: 76 U/L (ref 45–117)
ALT SERPL-CCNC: 28 U/L (ref 12–78)
ANION GAP SERPL CALC-SCNC: 9 MMOL/L (ref 5–15)
AST SERPL-CCNC: 15 U/L (ref 15–37)
BASOPHILS # BLD: 0.1 K/UL (ref 0–0.1)
BASOPHILS NFR BLD: 1 % (ref 0–1)
BILIRUB SERPL-MCNC: 1.1 MG/DL (ref 0.2–1)
BNP SERPL-MCNC: 69 PG/ML
BUN SERPL-MCNC: 6 MG/DL (ref 6–20)
BUN/CREAT SERPL: 5 (ref 12–20)
CALCIUM SERPL-MCNC: 8.9 MG/DL (ref 8.5–10.1)
CHLORIDE SERPL-SCNC: 105 MMOL/L (ref 97–108)
CO2 SERPL-SCNC: 25 MMOL/L (ref 21–32)
CREAT SERPL-MCNC: 1.21 MG/DL (ref 0.7–1.3)
DIFFERENTIAL METHOD BLD: NORMAL
EOSINOPHIL # BLD: 0.2 K/UL (ref 0–0.4)
EOSINOPHIL NFR BLD: 3 % (ref 0–7)
ERYTHROCYTE [DISTWIDTH] IN BLOOD BY AUTOMATED COUNT: 14.5 % (ref 11.5–14.5)
GLOBULIN SER CALC-MCNC: 3.9 G/DL (ref 2–4)
GLUCOSE SERPL-MCNC: 95 MG/DL (ref 65–100)
HCT VFR BLD AUTO: 47.9 % (ref 36.6–50.3)
HGB BLD-MCNC: 15.4 G/DL (ref 12.1–17)
IMM GRANULOCYTES # BLD AUTO: 0 K/UL (ref 0–0.04)
IMM GRANULOCYTES NFR BLD AUTO: 0 % (ref 0–0.5)
LYMPHOCYTES # BLD: 2.6 K/UL (ref 0.8–3.5)
LYMPHOCYTES NFR BLD: 42 % (ref 12–49)
MCH RBC QN AUTO: 28.2 PG (ref 26–34)
MCHC RBC AUTO-ENTMCNC: 32.2 G/DL (ref 30–36.5)
MCV RBC AUTO: 87.7 FL (ref 80–99)
MONOCYTES # BLD: 0.7 K/UL (ref 0–1)
MONOCYTES NFR BLD: 11 % (ref 5–13)
NEUTS SEG # BLD: 2.7 K/UL (ref 1.8–8)
NEUTS SEG NFR BLD: 43 % (ref 32–75)
NRBC # BLD: 0 K/UL (ref 0–0.01)
NRBC BLD-RTO: 0 PER 100 WBC
PLATELET # BLD AUTO: 192 K/UL (ref 150–400)
PMV BLD AUTO: 9.9 FL (ref 8.9–12.9)
POTASSIUM SERPL-SCNC: 3.6 MMOL/L (ref 3.5–5.1)
PROT SERPL-MCNC: 7.8 G/DL (ref 6.4–8.2)
RBC # BLD AUTO: 5.46 M/UL (ref 4.1–5.7)
SODIUM SERPL-SCNC: 139 MMOL/L (ref 136–145)
TROPONIN-HIGH SENSITIVITY: 7 NG/L (ref 0–76)
TROPONIN-HIGH SENSITIVITY: 8 NG/L (ref 0–76)
WBC # BLD AUTO: 6.2 K/UL (ref 4.1–11.1)

## 2022-09-17 PROCEDURE — 84484 ASSAY OF TROPONIN QUANT: CPT

## 2022-09-17 PROCEDURE — 80053 COMPREHEN METABOLIC PANEL: CPT

## 2022-09-17 PROCEDURE — 71046 X-RAY EXAM CHEST 2 VIEWS: CPT

## 2022-09-17 PROCEDURE — 96374 THER/PROPH/DIAG INJ IV PUSH: CPT

## 2022-09-17 PROCEDURE — 93005 ELECTROCARDIOGRAM TRACING: CPT

## 2022-09-17 PROCEDURE — 85025 COMPLETE CBC W/AUTO DIFF WBC: CPT

## 2022-09-17 PROCEDURE — 36415 COLL VENOUS BLD VENIPUNCTURE: CPT

## 2022-09-17 PROCEDURE — 74011000250 HC RX REV CODE- 250: Performed by: EMERGENCY MEDICINE

## 2022-09-17 PROCEDURE — 83880 ASSAY OF NATRIURETIC PEPTIDE: CPT

## 2022-09-17 PROCEDURE — 99285 EMERGENCY DEPT VISIT HI MDM: CPT

## 2022-09-17 RX ORDER — LABETALOL HYDROCHLORIDE 5 MG/ML
20 INJECTION, SOLUTION INTRAVENOUS ONCE
Status: COMPLETED | OUTPATIENT
Start: 2022-09-17 | End: 2022-09-17

## 2022-09-17 RX ADMIN — LABETALOL HYDROCHLORIDE 20 MG: 5 INJECTION INTRAVENOUS at 20:59

## 2022-09-18 LAB
ATRIAL RATE: 67 BPM
ATRIAL RATE: 74 BPM
CALCULATED P AXIS, ECG09: 30 DEGREES
CALCULATED P AXIS, ECG09: 55 DEGREES
CALCULATED R AXIS, ECG10: -44 DEGREES
CALCULATED R AXIS, ECG10: -63 DEGREES
CALCULATED T AXIS, ECG11: -27 DEGREES
CALCULATED T AXIS, ECG11: -7 DEGREES
DIAGNOSIS, 93000: NORMAL
DIAGNOSIS, 93000: NORMAL
P-R INTERVAL, ECG05: 176 MS
P-R INTERVAL, ECG05: 194 MS
Q-T INTERVAL, ECG07: 478 MS
Q-T INTERVAL, ECG07: 496 MS
QRS DURATION, ECG06: 176 MS
QRS DURATION, ECG06: 182 MS
QTC CALCULATION (BEZET), ECG08: 524 MS
QTC CALCULATION (BEZET), ECG08: 530 MS
VENTRICULAR RATE, ECG03: 67 BPM
VENTRICULAR RATE, ECG03: 74 BPM

## 2022-09-18 NOTE — ED PROVIDER NOTES
EMERGENCY DEPARTMENT HISTORY AND PHYSICAL EXAM      Date: 9/17/2022  Patient Name: Lowell Kilpatrick.    History of Presenting Illness     Chief Complaint   Patient presents with    Chest Pain     Reports new onset of center chest pain x 3 days, non-radiating pain. Denied SOB, blurred vision, or dizziness. History Provided By: Patient    HPI: Jes Flores Sr., 69 y.o. male with a past medical history significant for BPH, hyperlipidemia, hypertension, medical problems as stated below presents to the ED with cc of presenting with complaints of substernal chest pain intermittently over the last 3 days. Patient has been on significant strain working multiple double shifts as a  at [employer]. 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 Chest pain seems to be associated with some elevated blood pressure during this time. The chest pain only lasted 1 to 2 minutes and does not radiate to the neck, arms or back. Is not exertional nor associated with any diaphoresis or shortness of breath. No other associated symptoms. No other exacerbating or mounting factors. There are no other complaints, changes, or physical findings at this time. PCP: Celeste Muir MD    No current facility-administered medications on file prior to encounter. Current Outpatient Medications on File Prior to Encounter   Medication Sig Dispense Refill    atenoloL (TENORMIN) 50 mg tablet Take 1 Tablet by mouth nightly. 90 Tablet 3    dimethicone/zinc oxide (VANICREAM DIAPER RASH EX) APPLY LIBERAL AMOUNT TO AFFECTED AREA TWICE A DAY APPLY TO ALL DRY SKIN AREAS FOR MOISTRURIZATION      halobetasol (ULTRAVATE) 0.05 % topical cream       calcipotriene (DOVONEX) 0.005 % topical cream       FIBER CHOICE PO Take  by mouth. cholecalciferol (VITAMIN D3) (2,000 UNITS /50 MCG) cap capsule Take 2,000 Units by mouth daily. vitamin E (AQUA GEMS) 400 unit capsule Take 400 Units by mouth daily. vitamin e (E GEMS) 1,000 unit capsule Take 1,000 Units by mouth daily. amLODIPine (NORVASC) 5 mg tablet Take 1 Tablet by mouth daily. TAKE 1 TABLET DAILY 90 Tablet 3    finasteride (PROSCAR) 5 mg tablet Take 1 Tablet by mouth daily. TAKE 1 TABLET BY MOUTH EVERY DAY around 1 pm 90 Tablet 3    colchicine 0.6 mg tablet Take 2 tablets at the onset of your gout and may repeat in 4 hours with 1 tablet then 1 tablet daily as needed 90 Tablet 3    tamsulosin (FLOMAX) 0.4 mg capsule Take 1 Capsule by mouth daily. 90 Capsule 3    clotrimazole-betamethasone (LOTRISONE) topical cream Apply  to affected area two (2) times a day.  45 g 11       Past History     Past Medical History:  Past Medical History:   Diagnosis Date    BPH (benign prostatic hyperplasia)     Gout     Hamstring muscle strain 03/06/2019    Hypercholesterolemia     Hypertension     IGT (impaired glucose tolerance) 08/01/2018    Neurogenic claudication ([address] 06/29/2022    Prostatism     S/P colonoscopic polypectomy 05/31/2017    colon polyp Bob Jimenez MD    S/P colonoscopy with polypectomy 10/01/2020    Esteban Major md tubular adenoma - repeat in  5 yrs    Sleep apnea     uses CPAP    Trigger thumb of right hand 07/17/2019       Past Surgical History:  Past Surgical History:   Procedure Laterality Date    COLONOSCOPY N/A 5/31/2017    COLONOSCOPY performed by Bob Jimenez MD at [facility] ENDOSCOPY    COLONOSCOPY N/A 9/30/2020    COLONOSCOPY performed by Raz Villegas MD at [facility] ENDOSCOPY    HX COLONOSCOPY      HX OTHER SURGICAL      ingrown toenail repair       Family History:  Family History   Problem Relation Age of Onset    Cancer Mother     Hypertension Mother     Cancer Father        Social History:  Social History     Tobacco Use    Smoking status: Never    Smokeless tobacco: Never   Vaping Use    Vaping Use: Never used   Substance Use Topics    Alcohol use: No    Drug use: No       Allergies: Allergies   Allergen Reactions    Shellfish Containing Products Rash         Review of Systems   Review of Systems   Constitutional:  Negative for chills, diaphoresis, fatigue and fever. HENT:  Negative for ear pain and sore throat. Eyes:  Negative for pain and redness. Respiratory:  Negative for cough and shortness of breath. Cardiovascular:  Positive for chest pain. Negative for leg swelling. Gastrointestinal:  Negative for abdominal pain, diarrhea, nausea and vomiting. Endocrine: Negative for cold intolerance and heat intolerance. Genitourinary:  Negative for flank pain and hematuria. Musculoskeletal:  Negative for back pain and neck stiffness. Skin:  Negative for rash and wound. Neurological:  Negative for dizziness, syncope and headaches. All other systems reviewed and are negative. Physical Exam   Physical Exam  Vitals and nursing note reviewed. Constitutional:       General: He is not in acute distress. Appearance: He is well-developed. He is not ill-appearing. HENT:      Head: Normocephalic and atraumatic. Mouth/Throat:      Pharynx: No oropharyngeal exudate. Eyes:      Conjunctiva/sclera: Conjunctivae normal.      Pupils: Pupils are equal, round, and reactive to light. Cardiovascular:      Rate and Rhythm: Normal rate and regular rhythm. Heart sounds: No murmur heard. Pulmonary:      Effort: Pulmonary effort is normal. No respiratory distress. Breath sounds: Normal breath sounds. No wheezing. Abdominal:      General: Bowel sounds are normal. There is no distension. Palpations: Abdomen is soft. Tenderness: There is no abdominal tenderness. Musculoskeletal:         General: No deformity. Normal range of motion. Cervical back: Normal range of motion. Skin:     General: Skin is warm and dry. Findings: No rash.    Neurological:      Mental Status: He is alert and oriented to person, place, and time.      Cranial Nerves: No cranial nerve deficit. Sensory: No sensory deficit. Motor: No weakness. Coordination: Coordination normal.      Gait: Gait normal.   Psychiatric:         Behavior: Behavior normal.       Diagnostic Study Results     Labs -     Recent Results (from the past 24 hour(s))   EKG, 12 LEAD, INITIAL    Collection Time: 09/17/22  4:55 PM   Result Value Ref Range    Ventricular Rate 74 BPM    Atrial Rate 74 BPM    P-R Interval 194 ms    QRS Duration 176 ms    Q-T Interval 478 ms    QTC Calculation (Bezet) 530 ms    Calculated P Axis 55 degrees    Calculated R Axis -44 degrees    Calculated T Axis -7 degrees    Diagnosis       Normal sinus rhythm  Left axis deviation  Right bundle branch block  When compared with ECG of 02-SEP-2020 23:54,  No significant change was found     CBC WITH AUTOMATED DIFF    Collection Time: 09/17/22  4:58 PM   Result Value Ref Range    WBC 6.2 4.1 - 11.1 K/uL    RBC 5.46 4.10 - 5.70 M/uL    HGB 15.4 12.1 - 17.0 g/dL    HCT 47.9 36.6 - 50.3 %    MCV 87.7 80.0 - 99.0 FL    MCH 28.2 26.0 - 34.0 PG    MCHC 32.2 30.0 - 36.5 g/dL    RDW 14.5 11.5 - 14.5 %    PLATELET 366 753 - 479 K/uL    MPV 9.9 8.9 - 12.9 FL    NRBC 0.0 0  WBC    ABSOLUTE NRBC 0.00 0.00 - 0.01 K/uL    NEUTROPHILS 43 32 - 75 %    LYMPHOCYTES 42 12 - 49 %    MONOCYTES 11 5 - 13 %    EOSINOPHILS 3 0 - 7 %    BASOPHILS 1 0 - 1 %    IMMATURE GRANULOCYTES 0 0.0 - 0.5 %    ABS. NEUTROPHILS 2.7 1.8 - 8.0 K/UL    ABS. LYMPHOCYTES 2.6 0.8 - 3.5 K/UL    ABS. MONOCYTES 0.7 0.0 - 1.0 K/UL    ABS. EOSINOPHILS 0.2 0.0 - 0.4 K/UL    ABS. BASOPHILS 0.1 0.0 - 0.1 K/UL    ABS. IMM.  GRANS. 0.0 0.00 - 0.04 K/UL    DF AUTOMATED     METABOLIC PANEL, COMPREHENSIVE    Collection Time: 09/17/22  4:58 PM   Result Value Ref Range    Sodium 139 136 - 145 mmol/L    Potassium 3.6 3.5 - 5.1 mmol/L    Chloride 105 97 - 108 mmol/L    CO2 25 21 - 32 mmol/L    Anion gap 9 5 - 15 mmol/L    Glucose 95 65 - 100 mg/dL    BUN 6 6 - 20 MG/DL    Creatinine 1.21 0.70 - 1.30 MG/DL    BUN/Creatinine ratio 5 (L) 12 - 20      GFR est AA >60 >60 ml/min/1.73m2    GFR est non-AA 59 (L) >60 ml/min/1.73m2    Calcium 8.9 8.5 - 10.1 MG/DL    Bilirubin, total 1.1 (H) 0.2 - 1.0 MG/DL    ALT (SGPT) 28 12 - 78 U/L    AST (SGOT) 15 15 - 37 U/L    Alk. phosphatase 76 45 - 117 U/L    Protein, total 7.8 6.4 - 8.2 g/dL    Albumin 3.9 3.5 - 5.0 g/dL    Globulin 3.9 2.0 - 4.0 g/dL    A-G Ratio 1.0 (L) 1.1 - 2.2     NT-PRO BNP    Collection Time: 09/17/22  4:58 PM   Result Value Ref Range    NT pro-BNP 69 <125 PG/ML   TROPONIN-HIGH SENSITIVITY    Collection Time: 09/17/22  4:58 PM   Result Value Ref Range    Troponin-High Sensitivity 7 0 - 76 ng/L   EKG, 12 LEAD, SUBSEQUENT    Collection Time: 09/17/22  8:31 PM   Result Value Ref Range    Ventricular Rate 67 BPM    Atrial Rate 67 BPM    P-R Interval 176 ms    QRS Duration 182 ms    Q-T Interval 496 ms    QTC Calculation (Bezet) 524 ms    Calculated P Axis 30 degrees    Calculated R Axis -63 degrees    Calculated T Axis -27 degrees    Diagnosis       Normal sinus rhythm  Right bundle branch block  Left anterior fascicular block  ** Bifascicular block **  When compared with ECG of 17-SEP-2022 16:55,  MANUAL COMPARISON REQUIRED, DATA IS UNCONFIRMED     TROPONIN-HIGH SENSITIVITY    Collection Time: 09/17/22  8:34 PM   Result Value Ref Range    Troponin-High Sensitivity 8 0 - 76 ng/L       Radiologic Studies -   XR CHEST PA LAT   Final Result   No acute findings. CT Results  (Last 48 hours)      None          CXR Results  (Last 48 hours)                 09/17/22 1849  XR CHEST PA LAT Final result    Impression:  No acute findings. Narrative:  EXAM: XR CHEST PA LAT       INDICATION: Chest Pain       COMPARISON: Chest x-ray 8/24/2019. FINDINGS: PA and lateral radiographs of the chest demonstrate clear lungs.  The   cardiac and mediastinal contours and pulmonary vascularity are normal.   Atherosclerotic calcifications affect the aortic arch and the thoracic aorta is   tortuous. The bones and soft tissues are within normal limits. Medical Decision Making   I am the first provider for this patient. I reviewed the vital signs, available nursing notes, past medical history, past surgical history, family history and social history. Vital Signs-Reviewed the patient's vital signs. Patient Vitals for the past 12 hrs:   Temp Pulse Resp BP SpO2   09/17/22 2059 -- 71 -- (!) 157/96 --   09/17/22 1649 97.6 °F (36.4 °C) 77 18 (!) 171/90 97 %       Records Reviewed: Nursing records and medical records reviewed    MDM:  Patient presents with CP. DDx:  ACS, Aortic dissection, PNA, PE, PTX, pericarditis, myocarditis, GERD, costochondritis, anxiety. Provider Notes (Medical Decision Making):   64-year-old male presenting with atypical chest pain over the last 3 days. Seems to be associated with increased rest at work. Serial EKGs showed no dynamic ST segment changes serial troponins showed no rise in troponin level. Patient's heart score is 3 or less, no indication for admission at this time. ED Course:   Initial assessment performed. The patients presenting problems have been discussed, and they are in agreement with the care plan formulated and outlined with them. I have encouraged them to ask questions as they arise throughout their visit. Critical Care:  None      Disposition:  10:13 PM  Noel Rosa Sr.'s  results have been reviewed with him. He has been counseled regarding his diagnosis. He verbally conveys understanding and agreement of the signs, symptoms, diagnosis, treatment and prognosis and additionally agrees to follow up as recommended with Dr. Dilma Guan MD in 24 - 48 hours. He also agrees with the care-plan and conveys that all of his questions have been answered.   I have also put together some discharge instructions for him that include: 1) educational information regarding their diagnosis, 2) how to care for their diagnosis at home, as well a 3) list of reasons why they would want to return to the ED prior to their follow-up appointment, should their condition change. DISCHARGE PLAN:  1. Current Discharge Medication List        2. Follow-up Information       Follow up With Specialties Details Why Contact Info    Placido Parker MD Internal Medicine Physician In 3 days For a follow-up evaluation. Please follow-up to arrange for further management of your outpatient blood pressure medications. Blanca Cameron. [address] [address]      Jennifer Ashford MD Cardio Vascular Surgery, Interventional Cardiology Physician, Cardiovascular Disease Physician In 1 week For a follow-up evaluation. This is her cardiologist she can see for further evaluation of your chest pain and to have a formal stress test performed. [address]  [phone]            3. Return to ED if worse     Diagnosis     Clinical Impression:   1. Acute chest pain        Attestations:    Myah Blevins MD    Please note that this dictation was completed with appsplit, the computer voice recognition software. Quite often unanticipated grammatical, syntax, homophones, and other interpretive errors are inadvertently transcribed by the computer software. Please disregard these errors. Please excuse any errors that have escaped final proofreading. Thank you.

## 2022-09-18 NOTE — ED NOTES
Discharge paperwork reviewed with patient and wife, no questions at this time. Pt is ambulatory at discharge.

## 2022-09-18 NOTE — DISCHARGE INSTRUCTIONS
It was a pleasure taking care of you at [facility] Emergency Department today. We know that when you come to [facility], you are entrusting us with your health, comfort, and safety. Our physicians and nurses honor that trust, and we truly appreciate the opportunity to care for you and your loved ones. We also value your feedback. If you receive a survey about your Emergency Department experience today, please fill it out. We care about our patients' feedback, and we listen to what you have to say. Thank you!

## 2022-09-29 DIAGNOSIS — I73.9 CLAUDICATION (HCC): Primary | ICD-10-CM

## 2022-10-02 ENCOUNTER — PATIENT MESSAGE (OUTPATIENT)
Dept: INTERNAL MEDICINE CLINIC | Age: 67
End: 2022-10-02

## 2022-11-02 ENCOUNTER — OFFICE VISIT (OUTPATIENT)
Dept: INTERNAL MEDICINE CLINIC | Age: 67
End: 2022-11-02
Payer: MEDICARE

## 2022-11-02 VITALS
DIASTOLIC BLOOD PRESSURE: 83 MMHG | HEART RATE: 59 BPM | WEIGHT: 264.9 LBS | BODY MASS INDEX: 35.11 KG/M2 | SYSTOLIC BLOOD PRESSURE: 134 MMHG | RESPIRATION RATE: 18 BRPM | TEMPERATURE: 97.7 F | OXYGEN SATURATION: 97 % | HEIGHT: 73 IN

## 2022-11-02 DIAGNOSIS — Z23 ENCOUNTER FOR IMMUNIZATION: ICD-10-CM

## 2022-11-02 DIAGNOSIS — E66.01 SEVERE OBESITY WITH BODY MASS INDEX (BMI) OF 35.0 TO 39.9 WITH SERIOUS COMORBIDITY (HCC): ICD-10-CM

## 2022-11-02 DIAGNOSIS — R07.9 CHEST PAIN, UNSPECIFIED TYPE: ICD-10-CM

## 2022-11-02 DIAGNOSIS — M54.16 LUMBAR RADICULOPATHY: ICD-10-CM

## 2022-11-02 DIAGNOSIS — I10 PRIMARY HYPERTENSION: ICD-10-CM

## 2022-11-02 DIAGNOSIS — Z00.00 MEDICARE ANNUAL WELLNESS VISIT, SUBSEQUENT: Primary | ICD-10-CM

## 2022-11-02 DIAGNOSIS — Z13.39 SCREENING FOR ALCOHOLISM: ICD-10-CM

## 2022-11-02 DIAGNOSIS — G95.19 NEUROGENIC CLAUDICATION (HCC): ICD-10-CM

## 2022-11-02 DIAGNOSIS — E78.1 HYPERTRIGLYCERIDEMIA: ICD-10-CM

## 2022-11-02 PROCEDURE — G8417 CALC BMI ABV UP PARAM F/U: HCPCS | Performed by: INTERNAL MEDICINE

## 2022-11-02 PROCEDURE — G0008 ADMIN INFLUENZA VIRUS VAC: HCPCS | Performed by: INTERNAL MEDICINE

## 2022-11-02 PROCEDURE — 1123F ACP DISCUSS/DSCN MKR DOCD: CPT | Performed by: INTERNAL MEDICINE

## 2022-11-02 PROCEDURE — 90694 VACC AIIV4 NO PRSRV 0.5ML IM: CPT | Performed by: INTERNAL MEDICINE

## 2022-11-02 PROCEDURE — G8432 DEP SCR NOT DOC, RNG: HCPCS | Performed by: INTERNAL MEDICINE

## 2022-11-02 PROCEDURE — 3017F COLORECTAL CA SCREEN DOC REV: CPT | Performed by: INTERNAL MEDICINE

## 2022-11-02 PROCEDURE — G8754 DIAS BP LESS 90: HCPCS | Performed by: INTERNAL MEDICINE

## 2022-11-02 PROCEDURE — G8752 SYS BP LESS 140: HCPCS | Performed by: INTERNAL MEDICINE

## 2022-11-02 PROCEDURE — G0439 PPPS, SUBSEQ VISIT: HCPCS | Performed by: INTERNAL MEDICINE

## 2022-11-02 PROCEDURE — G8536 NO DOC ELDER MAL SCRN: HCPCS | Performed by: INTERNAL MEDICINE

## 2022-11-02 PROCEDURE — 3074F SYST BP LT 130 MM HG: CPT | Performed by: INTERNAL MEDICINE

## 2022-11-02 PROCEDURE — 1101F PT FALLS ASSESS-DOCD LE1/YR: CPT | Performed by: INTERNAL MEDICINE

## 2022-11-02 PROCEDURE — G8427 DOCREV CUR MEDS BY ELIG CLIN: HCPCS | Performed by: INTERNAL MEDICINE

## 2022-11-02 PROCEDURE — 3078F DIAST BP <80 MM HG: CPT | Performed by: INTERNAL MEDICINE

## 2022-11-02 PROCEDURE — 99213 OFFICE O/P EST LOW 20 MIN: CPT | Performed by: INTERNAL MEDICINE

## 2022-11-02 RX ORDER — FINASTERIDE 5 MG/1
5 TABLET, FILM COATED ORAL DAILY
Qty: 90 TABLET | Refills: 3 | Status: SHIPPED | OUTPATIENT
Start: 2022-11-02

## 2022-11-02 RX ORDER — TAMSULOSIN HYDROCHLORIDE 0.4 MG/1
0.8 CAPSULE ORAL DAILY
Qty: 120 CAPSULE | Refills: 5 | Status: SHIPPED | OUTPATIENT
Start: 2022-11-02

## 2022-11-02 NOTE — PATIENT INSTRUCTIONS
Medicare Wellness Visit, Male    The best way to live healthy is to have a lifestyle where you eat a well-balanced diet, exercise regularly, limit alcohol use, and quit all forms of tobacco/nicotine, if applicable. Regular preventive services are another way to keep healthy. Preventive services (vaccines, screening tests, monitoring & exams) can help personalize your care plan, which helps you manage your own care. Screening tests can find health problems at the earliest stages, when they are easiest to treat. [facility] follows the current, evidence-based guidelines published by the [facility] Loret[facility] ([facility]) when recommending preventive services for our patients. Because we follow these guidelines, sometimes recommendations change over time as research supports it. (For example, a prostate screening blood test is no longer routinely recommended for men with no symptoms). Of course, you and your doctor may decide to screen more often for some diseases, based on your risk and co-morbidities (chronic disease you are already diagnosed with). Preventive services for you include:  - Medicare offers their members a free annual wellness visit, which is time for you and your primary care provider to discuss and plan for your preventive service needs. Take advantage of this benefit every year!  -All adults over age 72 should receive the recommended pneumonia vaccines. Current USPSTF guidelines recommend a series of two vaccines for the best pneumonia protection.   -All adults should have a flu vaccine yearly and tetanus vaccine every 10 years.  -All adults age 48 and older should receive the shingles vaccines (series of two vaccines).        -All adults age 38-68 who are overweight should have a diabetes screening test once every three years.   -Other screening tests & preventive services for persons with diabetes include: an eye exam to screen for diabetic retinopathy, a kidney function test, a foot exam, and stricter control over your cholesterol.   -Cardiovascular screening for adults with routine risk involves an electrocardiogram (ECG) at intervals determined by the provider.   -Colorectal cancer screening should be done for adults age 54-65 with no increased risk factors for colorectal cancer. There are a number of acceptable methods of screening for this type of cancer. Each test has its own benefits and drawbacks. Discuss with your provider what is most appropriate for you during your annual wellness visit. The different tests include: colonoscopy (considered the best screening method), a fecal occult blood test, a fecal DNA test, and sigmoidoscopy.  -All adults born between [facility] should be screened once for Hepatitis C.  -An Abdominal Aortic Aneurysm (AAA) Screening is recommended for men age 73-68 who has ever smoked in their lifetime.      Here is a list of your current Health Maintenance items (your personalized list of preventive services) with a due date:  Health Maintenance Due   Topic Date Due    Eye Exam  Never done    Shingles Vaccine (1 of 2) Never done    COVID-19 Vaccine (4 - Booster for cartmi series) 12/05/2021    Yearly Flu Vaccine (1) 08/01/2022

## 2022-11-02 NOTE — PROGRESS NOTES
SPORTS MEDICINE AND PRIMARY CARE  Starr Quinonez MD, [address] [address]  Phone:  [phone]  Fax: [phone]       Chief Complaint   Patient presents with    Hypertension   . SUBJECTIVE:    Angel Candelario. is a 69 y.o. male Patient returns today after a visit to the emergency department on 09/17/22, where he saw Manolo Fagan MD complaining of chest pain of three days duration. Evaluation was unremarkable. Chest xray was negative. Several EKGs were unremarkable. Patient's HEART score was 3 or less, so no indication for admission. He had been having the chest pain for three days. He was discharged home and recommended to see us in the next day or two and comes in today for evaluation. 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 Patient saw Dr. Rosemarie Jin, who has requested myocardial perfusion imaging treadmill test, which will be done on the 23rd of this month for the chest pain. Discomfort in his legs. When he stands up, for example, he feels numb at times from his hip down to his left leg. He had an xray performed of the lumbar spine that revealed stable degenerative disc changes at L4-5 and facet degenerative changes at L4-5 and L5-S1 with grade 1 anterolisthesis at L4-5. Current Outpatient Medications   Medication Sig Dispense Refill    atenoloL (TENORMIN) 50 mg tablet Take 1 Tablet by mouth nightly. 90 Tablet 3    dimethicone/zinc oxide (VANICREAM DIAPER RASH EX) APPLY LIBERAL AMOUNT TO AFFECTED AREA TWICE A DAY APPLY TO ALL DRY SKIN AREAS FOR MOISTRURIZATION      halobetasol (ULTRAVATE) 0.05 % topical cream       calcipotriene (DOVONEX) 0.005 % topical cream       FIBER CHOICE PO Take  by mouth.       cholecalciferol (VITAMIN D3) (2,000 UNITS /50 MCG) cap capsule Take 2,000 Units by mouth daily. vitamin E (AQUA GEMS) 400 unit capsule Take 400 Units by mouth daily. vitamin e (E GEMS) 1,000 unit capsule Take 1,000 Units by mouth daily. amLODIPine (NORVASC) 5 mg tablet Take 1 Tablet by mouth daily. TAKE 1 TABLET DAILY 90 Tablet 3    finasteride (PROSCAR) 5 mg tablet Take 1 Tablet by mouth daily. TAKE 1 TABLET BY MOUTH EVERY DAY around 1 pm 90 Tablet 3    colchicine 0.6 mg tablet Take 2 tablets at the onset of your gout and may repeat in 4 hours with 1 tablet then 1 tablet daily as needed 90 Tablet 3    tamsulosin (FLOMAX) 0.4 mg capsule Take 1 Capsule by mouth daily. 90 Capsule 3    clotrimazole-betamethasone (LOTRISONE) topical cream Apply  to affected area two (2) times a day.  45 g 11     Past Medical History:   Diagnosis Date    BPH (benign prostatic hyperplasia)     Chest pain 09/17/2022    er    Gout     Hamstring muscle strain 03/06/2019    Hypercholesterolemia     Hypertension     IGT (impaired glucose tolerance) 08/01/2018    Neurogenic claudication ([facility] [address] 06/29/2022    Prostatism     S/P colonoscopic polypectomy 05/31/2017    colon polyp Paul Martines MD    S/P colonoscopy with polypectomy 10/01/2020    Esteban Narayanan md tubular adenoma - repeat in  5 yrs    Sleep apnea     uses CPAP    Trigger thumb of right hand 07/17/2019     Past Surgical History:   Procedure Laterality Date    COLONOSCOPY N/A 5/31/2017    COLONOSCOPY performed by Paul Martines MD at [facility] ENDOSCOPY    COLONOSCOPY N/A 9/30/2020    COLONOSCOPY performed by Xiomara Baugh MD at [facility] ENDOSCOPY    HX COLONOSCOPY      HX OTHER SURGICAL      ingrown toenail repair     Allergies   Allergen Reactions    Shellfish Containing Products Rash         REVIEW OF SYSTEMS:  General: negative for - chills or fever  ENT: negative for - headaches, nasal congestion or tinnitus  Respiratory: negative for - cough, hemoptysis, shortness of breath or wheezing  Cardiovascular : negative for - chest pain, edema, palpitations or shortness of breath  Gastrointestinal: negative for - abdominal pain, blood in stools, heartburn or nausea/vomiting  Genito-Urinary: no dysuria, trouble voiding, or hematuria  Musculoskeletal: negative for - gait disturbance, joint pain, joint stiffness or joint swelling  Neurological: no TIA or stroke symptoms  Hematologic: no bruises, no bleeding, no swollen glands  Integument: no lumps, mole changes, nail changes or rash  Endocrine: no malaise/lethargy or unexpected weight changes      Social History     Socioeconomic History    Marital status:    Tobacco Use    Smoking status: Never    Smokeless tobacco: Never   Vaping Use    Vaping Use: Never used   Substance and Sexual Activity    Alcohol use: No    Drug use: No    Sexual activity: Yes     Partners: Female   Social History Narrative    Habits:  He is a lifetime nonsmoker, non drinker, non drug abuser. Social History:  The patient is , lives with his wife. 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 No grandchildren yet. He is a member of [employer]. Family History:  Father  76 of lung cancer. He was a cigarette smoker. Mother  68 of cancer. He has ten siblings. One brother , which he thinks he was murdered, but the official cause was he jumped off a bridge and they think he was also doing drugs     Family History   Problem Relation Age of Onset    Cancer Mother     Hypertension Mother     Cancer Father        OBJECTIVE:    Visit Vitals  /83 (BP 1 Location: Left upper arm, BP Patient Position: Sitting)   Pulse (!) 59   Temp 97.7 °F (36.5 °C) (Oral)   Resp 18   Ht 6' 1\" (1.854 m)   Wt 264 lb 14.4 oz (120.2 kg)   SpO2 97%   BMI 34.95 kg/m²     CONSTITUTIONAL: well , well nourished, appears age appropriate  EYES: perrla, eom intact  ENMT:moist mucous membranes, pharynx clear  NECK: supple. Thyroid normal  RESPIRATORY: Chest: clear bilaterally   CARDIOVASCULAR: Heart: regular rate and rhythm  GASTROINTESTINAL: Abdomen: soft, bowel sounds active  HEMATOLOGIC: no pathological lymph nodes palpated  MUSCULOSKELETAL: Extremities: no edema, pulse 1+   INTEGUMENT: No unusual rashes or suspicious skin lesions noted. Nails appear normal.  NEUROLOGIC: non-focal exam   MENTAL STATUS: alert and oriented, appropriate affect           ASSESSMENT:  1. Medicare annual wellness visit, subsequent    2. Screening for alcoholism    3. Chest pain, unspecified type    4. Primary hypertension    5. Hypertriglyceridemia    6. Neurogenic claudication (HCC)    7. Lumbar radiculopathy    8. Severe obesity with body mass index (BMI) of 35.0 to 39.9 with serious comorbidity ([address]      The chest pain has been evaluated by the cardiologist. We agree with the evaluation. He has had no further chest pain, which is good. Blood pressure is approaching goal at 134/83, no adjustments will be made. History of hypertriglyceridemia, for which he is taking a statin. The discomfort down his leg is radicular in origin. He also has neurogenic claudication and therefore we ask for MRI of the lumbar spine to determine the severity of the degenerative disc disease and whether further evaluation will be required. Obesity remains a concern and we encourage a heart healthy, weight reducing diet. He is up to date with care gaps with the exception of the last COVID vaccine. He will be back to see me in three to four months, sooner if he needs to. Encouraged him to communicate with us via [address] I have discussed the diagnosis with the patient and the intended plan as seen in the  Orders. The patient understands and agees with the plan. The patient has   received an after visit summary and questions were answered concerning  future plans  Patient labs and/or xrays were reviewed  Past records were reviewed. PLAN:  . No orders of the defined types were placed in this encounter. ATTENTION:   This medical record was transcribed using an electronic medical records system. Although proofread, it may and can contain electronic and spelling errors. Other human spelling and other errors may be present. Corrections may be executed at a later time. Please feel free to contact us for any clarifications as needed. R flank

## 2022-11-02 NOTE — PROGRESS NOTES
Pat Skinner is a 69 y.o. male    Chief Complaint   Patient presents with    Hypertension     1. Have you been to the ER, urgent care clinic since your last visit? Hospitalized since your last visit? No    2. Have you seen or consulted any other health care providers outside of the [address] [place] [address] since your last visit? Include any pap smears or colon screening. No  This is the Subsequent Medicare Annual Wellness Exam, performed 12 months or more after the Initial AWV or the last Subsequent AWV    I have reviewed the patient's medical history in detail and updated the computerized patient record. Assessment/Plan   Education and counseling provided:  Are appropriate based on today's review and evaluation    1. Medicare annual wellness visit, subsequent  2. Screening for alcoholism  -     MS ANNUAL ALCOHOL SCREEN 15 MIN  3. Chest pain, unspecified type  4. Primary hypertension  5. Hypertriglyceridemia  6. Neurogenic claudication ([facility] [address]  -     MRI LUMB SPINE WO CONT; Future  7. Lumbar radiculopathy  -     MRI LUMB SPINE WO CONT; Future  8.  Severe obesity with body mass index (BMI) of 35.0 to 39.9 with serious comorbidity (HCC)     Depression Risk Factor Screening     3 most recent PHQ Screens 11/2/2022   Little interest or pleasure in doing things Not at all   Feeling down, depressed, irritable, or hopeless Not at all   Total Score PHQ 2 0   Trouble falling or staying asleep, or sleeping too much Not at all   Feeling tired or having little energy Not at all   Poor appetite, weight loss, or overeating Not at all   Feeling bad about yourself - or that you are a failure or have let yourself or your family down Not at all   Trouble concentrating on things such as school, work, reading, or watching TV Not at all   Moving or speaking so slowly that other people could have noticed; or the opposite being so fidgety that others notice Not at all   Thoughts of being better off dead, or hurting yourself in some way Not at all   PHQ 9 Score 0   How difficult have these problems made it for you to do your work, take care of your home and get along with others Not difficult at all       Alcohol & Drug Abuse Risk Screen    Do you average more than 1 drink per night or more than 7 drinks a week: No    In the past three months have you have had more than 4 drinks containing alcohol on one occasion: No          Functional Ability and Level of Safety    Hearing: Hearing is good. Activities of Daily Living: The home contains: no safety equipment. Patient does total self care      Ambulation: with no difficulty     Fall Risk:  Fall Risk Assessment, last 12 mths 11/2/2022   Able to walk? Yes   Fall in past 12 months? 0   Do you feel unsteady?  0   Are you worried about falling 0      Abuse Screen:  Patient is not abused       Cognitive Screening    Has your family/caregiver stated any concerns about your memory: no     Cognitive Screening: Normal - Verbal Fluency Test    Health Maintenance Due     Health Maintenance Due   Topic Date Due    Eye Exam Retinal or Dilated  Never done    COVID-19 Vaccine (4 - Booster for Pfizer series) 12/05/2021    Flu Vaccine (1) 08/01/2022       Patient Care Team   Patient Care Team:  Kendrick Gerard MD as PCP - General (Internal Medicine Physician)  Kendrick Gerard MD as PCP - [address] [place], [place] [address]tiera Nix Provider    History     Patient Active Problem List   Diagnosis Code    HTN (hypertension) I10    Hypertriglyceridemia E78.1    Obstructive sleep apnea G47.33    Prostatism N40.0    Gout M10.9    Insulin resistance E88.81    Severe obesity with body mass index (BMI) of 35.0 to 39.9 with serious comorbidity (HCC) E66.01    Type 2 diabetes with nephropathy ([facility] [address] E11.21    Hamstring muscle strain S76.319A    Trigger thumb of right hand M65.311    S/P colonoscopic polypectomy Z98.890    S/P colonoscopy with polypectomy Z98.890    Lumbar radiculopathy M54.16    Neurogenic claudication ([address] G95.19    Chest pain R07.9     Past Medical History:   Diagnosis Date    BPH (benign prostatic hyperplasia)     Chest pain 09/17/2022    er    Gout     Hamstring muscle strain 03/06/2019    Hypercholesterolemia     Hypertension     IGT (impaired glucose tolerance) 08/01/2018    Neurogenic claudication ([address] 06/29/2022    Prostatism     S/P colonoscopic polypectomy 05/31/2017    colon polyp Vanesa Perea MD    S/P colonoscopy with polypectomy 10/01/2020    Esteban Marte md tubular adenoma - repeat in  5 yrs    Sleep apnea     uses CPAP    Trigger thumb of right hand 07/17/2019      Past Surgical History:   Procedure Laterality Date    COLONOSCOPY N/A 5/31/2017    COLONOSCOPY performed by Vanesa Perea MD at [facility] ENDOSCOPY    COLONOSCOPY N/A 9/30/2020    COLONOSCOPY performed by Arcenio Chacon MD at [facility] ENDOSCOPY    HX COLONOSCOPY      HX OTHER SURGICAL      ingrown toenail repair     Current Outpatient Medications   Medication Sig Dispense Refill    tamsulosin (Flomax) 0.4 mg capsule Take 2 Capsules by mouth daily. 120 Capsule 5    finasteride (Proscar) 5 mg tablet Take 1 Tablet by mouth daily. 90 Tablet 3    finasteride (PROSCAR) 5 mg tablet Take 1 Tablet by mouth daily. TAKE 1 TABLET BY MOUTH EVERY DAY around 1 pm 90 Tablet 3    atenoloL (TENORMIN) 50 mg tablet Take 1 Tablet by mouth nightly. 90 Tablet 3    dimethicone/zinc oxide (VANICREAM DIAPER RASH EX) APPLY LIBERAL AMOUNT TO AFFECTED AREA TWICE A DAY APPLY TO ALL DRY SKIN AREAS FOR MOISTRURIZATION      halobetasol (ULTRAVATE) 0.05 % topical cream       calcipotriene (DOVONEX) 0.005 % topical cream       FIBER CHOICE PO Take  by mouth. cholecalciferol (VITAMIN D3) (2,000 UNITS /50 MCG) cap capsule Take 2,000 Units by mouth daily. vitamin E (AQUA GEMS) 400 unit capsule Take 400 Units by mouth daily. vitamin e (E GEMS) 1,000 unit capsule Take 1,000 Units by mouth daily.       amLODIPine (NORVASC) 5 mg tablet Take 1 Tablet by mouth daily. TAKE 1 TABLET DAILY 90 Tablet 3    colchicine 0.6 mg tablet Take 2 tablets at the onset of your gout and may repeat in 4 hours with 1 tablet then 1 tablet daily as needed 90 Tablet 3    tamsulosin (FLOMAX) 0.4 mg capsule Take 1 Capsule by mouth daily. 90 Capsule 3    clotrimazole-betamethasone (LOTRISONE) topical cream Apply  to affected area two (2) times a day.  45 g 11     Allergies   Allergen Reactions    Shellfish Containing Products Rash       Family History   Problem Relation Age of Onset    Cancer Mother     Hypertension Mother     Cancer Father      Social History     Tobacco Use    Smoking status: Never    Smokeless tobacco: Never   Substance Use Topics    Alcohol use: No         Mary Booker MA

## 2022-11-16 ENCOUNTER — HOSPITAL ENCOUNTER (OUTPATIENT)
Dept: MRI IMAGING | Age: 67
Discharge: HOME | End: 2022-11-16
Attending: INTERNAL MEDICINE
Payer: MEDICARE

## 2022-11-16 DIAGNOSIS — M54.16 LUMBAR RADICULOPATHY: ICD-10-CM

## 2022-11-16 DIAGNOSIS — G95.19 NEUROGENIC CLAUDICATION (HCC): ICD-10-CM

## 2022-11-16 DIAGNOSIS — M54.16 LUMBAR RADICULOPATHY: Primary | ICD-10-CM

## 2022-11-16 PROCEDURE — 72148 MRI LUMBAR SPINE W/O DYE: CPT

## 2022-11-17 NOTE — PROGRESS NOTES
Results of the MRI give us some reason for the discomfort going down your leg.   I have requested an orthopedic opinion and have given you the referral.  If the office does not call you in the next day or 2, and call the office and ask for the referral coordinator

## 2022-12-15 ENCOUNTER — OFFICE VISIT (OUTPATIENT)
Dept: ORTHOPEDIC SURGERY | Age: 67
End: 2022-12-15
Payer: MEDICARE

## 2022-12-15 VITALS — WEIGHT: 260 LBS | BODY MASS INDEX: 34.46 KG/M2 | HEIGHT: 73 IN

## 2022-12-15 DIAGNOSIS — M54.16 LUMBAR RADICULOPATHY: ICD-10-CM

## 2022-12-15 DIAGNOSIS — M47.816 LUMBAR SPONDYLOSIS: ICD-10-CM

## 2022-12-15 DIAGNOSIS — M43.16 SPONDYLOLISTHESIS OF LUMBAR REGION: ICD-10-CM

## 2022-12-15 DIAGNOSIS — M54.50 LUMBAR BACK PAIN: Primary | ICD-10-CM

## 2022-12-15 DIAGNOSIS — M48.062 SPINAL STENOSIS OF LUMBAR REGION WITH NEUROGENIC CLAUDICATION: ICD-10-CM

## 2022-12-15 RX ORDER — METHYLPREDNISOLONE 4 MG/1
TABLET ORAL
Qty: 1 DOSE PACK | Refills: 0 | Status: SHIPPED | OUTPATIENT
Start: 2022-12-15

## 2022-12-15 RX ORDER — TRAMADOL HYDROCHLORIDE 50 MG/1
50 TABLET ORAL
Qty: 28 TABLET | Refills: 0 | Status: SHIPPED | OUTPATIENT
Start: 2022-12-15 | End: 2022-12-22

## 2022-12-15 NOTE — PROGRESS NOTES
Pati Peters Sr. (: 1955) is a 69 y.o. male, established  patient, here for evaluation of the following chief complaint(s):  LOW BACK PAIN and Leg Pain         SUBJECTIVE/OBJECTIVE:    Pati Peters Sr. (: 1955) is a 69 y.o. male who presents for evaluation of lumbar back pain radiation to the bilateral lower extremities. States symptoms as been worsening in severity over the past 1 year. Patient is severely limited secondary to back pain, numbness and tingling in the legs and lower extremity weakness. He states his standing tolerance is less than 5 minutes and walking tolerance less than 1 block. Patient does not take any pain medication on a routine basis. Patient states his main focus of pain is the left low back, buttock and left posterior thigh. Symptoms are present in the bilateral lower extremities. Patient states his pain is severe and constant with standing and walking. Symptoms improved with rest and sitting. The patient denies saddle paraesthesias/ anaesthesia. Pain Assessment  12/15/2022   Location of Pain Leg;Back   Location Modifiers Posterior   Severity of Pain 2   Relieving Factors Rest   Result of Injury No          ROS    The patient denies fevers, chills, chest pain, shortness of breath, nausea, vomiting. Positive for musculoskeletal issues as described in the HPI. Vitals:  Ht 6' 1\" (1.854 m)   Wt 260 lb (117.9 kg)   BMI 34.30 kg/m²    Body mass index is 34.3 kg/m². PHYSICAL EXAM:    The patient is alert and oriented x3 and in no acute distress. Patient ambulates with a stooped gait with the support of a cane. Sensory testing in all major nerve distributions in the lower extremities is intact and symmetric.  Manual motor testing of the major muscle groups of the lower extremities including dorsiflexion/EHL/ plantarflexion, knee flexion/extension, hip flexion/extension/abduction/ adduction is intact and symmetric in the bilateral lower extremities with the exception of left hip flexion which is weak 4/5. Deep tendon reflexes flat and symmetric in the bilateral knees and ankles. Hip range of motion is pain-free bilaterally. Negative straight leg raise bilaterally. No evidence of clonus bilaterally. Babinski's downgoing and symmetric bilaterally. Distal pulses intact and symmetric bilaterally. There is no tenderness to palpation of the thoracic, lumbar or sacral regions. IMAGING:      AP, lateral and spot lumbar view radiographs of the lumbar spine obtained on 6/29/2022 at an outside facility were reviewed and demonstrate mild intervertebral disc height loss at L4-5 and L5-S1 where there is evidence of spondylosis with foraminal encroachment at L5-S1. There is grade 1 anterolisthesis L4 and L5 of the 3 millimeters. No evidence of acute bony abnormality. MRI Results (most recent):  Results from [place] encounter on 11/16/22    MRI LUMB SPINE WO CONT    Narrative  EXAM: MRI LUMB SPINE WO CONT  Clinical history: Low back pain  INDICATION: . Low back pain, myelopathy    COMPARISON: None    TECHNIQUE: MR imaging of the lumbar spine was performed using the following  sequences: sagittal T1, T2, STIR;  axial T1, T2.    CONTRAST:  None. FINDINGS:    There is 4 mm anterolisthesis of L4 on L5. There is congenital narrowing of the  lumbar canal. Discogenic marrow degenerative changes. Vertebral body heights are  maintained. Scattered small Schmorl's nodes. There is redundancy of the cauda  equina. The conus medullaris terminates at L2. Signal and caliber of the distal spinal  cord are within normal limits. The paraspinal soft tissues are within normal limits. Lower thoracic spine: No herniation or stenosis. L1-L2: No herniation or stenosis. L2-L3: Disc desiccation and loss of disc height. Facet arthropathy. Ligament  flavum hypertrophy. Disc bulge. The canal is patent.  Foramina are patent    L3-L4: Moderate facet arthropathy. Ligamentum flavum hypertrophy. Facet  hypertrophy. Disc bulge. Canal is patent. There is minimal right foraminal  stenosis. L4-L5: Mild facet arthropathy. Severe ligamentum flavum hypertrophy. Minimal  anterolisthesis. Circumferential disc protrusion. There is very severe central  canal stenosis with redundancy of the cauda equina. Mild left foraminal  stenosis. L5-S1: Disc desiccation. Disc bulge. Mild facet arthropathy. Canal is patent. Moderate subarticular stenosis on the left    Impression  Multilevel disc and facet degenerative change with minimal spondylolisthesis. There is very severe canal stenosis at L4-5 with severe ligamentum flavum  hypertrophy. Redundancy of the cauda equina is related to severe canal stenosis  at L4-5. Additional, less severe degenerative findings are as described in detail above. Allergies   Allergen Reactions    Shellfish Containing Products Rash         Current Outpatient Medications   Medication Sig    methylPREDNISolone (MEDROL DOSEPACK) 4 mg tablet Per dose pack instructions    traMADoL (ULTRAM) 50 mg tablet Take 1 Tablet by mouth every six (6) hours as needed for Pain for up to 7 days. Max Daily Amount: 200 mg.    tamsulosin (Flomax) 0.4 mg capsule Take 2 Capsules by mouth daily. finasteride (Proscar) 5 mg tablet Take 1 Tablet by mouth daily. finasteride (PROSCAR) 5 mg tablet Take 1 Tablet by mouth daily. TAKE 1 TABLET BY MOUTH EVERY DAY around 1 pm    atenoloL (TENORMIN) 50 mg tablet Take 1 Tablet by mouth nightly. dimethicone/zinc oxide (VANICREAM DIAPER RASH EX) APPLY LIBERAL AMOUNT TO AFFECTED AREA TWICE A DAY APPLY TO ALL DRY SKIN AREAS FOR MOISTRURIZATION    halobetasol (ULTRAVATE) 0.05 % topical cream     calcipotriene (DOVONEX) 0.005 % topical cream     FIBER CHOICE PO Take  by mouth. cholecalciferol (VITAMIN D3) (2,000 UNITS /50 MCG) cap capsule Take 2,000 Units by mouth daily.     vitamin E (AQUA GEMS) 400 unit capsule Take 400 Units by mouth daily. vitamin e (E GEMS) 1,000 unit capsule Take 1,000 Units by mouth daily. amLODIPine (NORVASC) 5 mg tablet Take 1 Tablet by mouth daily. TAKE 1 TABLET DAILY    colchicine 0.6 mg tablet Take 2 tablets at the onset of your gout and may repeat in 4 hours with 1 tablet then 1 tablet daily as needed    tamsulosin (FLOMAX) 0.4 mg capsule Take 1 Capsule by mouth daily. clotrimazole-betamethasone (LOTRISONE) topical cream Apply  to affected area two (2) times a day. No current facility-administered medications for this visit. Past Medical History:   Diagnosis Date    BPH (benign prostatic hyperplasia)     Chest pain 09/17/2022    er    Gout     Hamstring muscle strain 03/06/2019    Hypercholesterolemia     Hypertension     IGT (impaired glucose tolerance) 08/01/2018    Neurogenic claudication ([address] 06/29/2022    Prostatism     S/P colonoscopic polypectomy 05/31/2017    colon polyp Frederich Halsted, MD    S/P colonoscopy with polypectomy 10/01/2020    Esteban Flores md tubular adenoma - repeat in  5 yrs    Sleep apnea     uses CPAP    Trigger thumb of right hand 07/17/2019          Past Surgical History:   Procedure Laterality Date    COLONOSCOPY N/A 5/31/2017    COLONOSCOPY performed by Frederich Halsted, MD at [facility] ENDOSCOPY    COLONOSCOPY N/A 9/30/2020    COLONOSCOPY performed by Cherisse Ormond, MD at [facility] ENDOSCOPY    HX COLONOSCOPY      HX OTHER SURGICAL      ingrown toenail repair         Family History   Problem Relation Age of Onset    Cancer Mother     Hypertension Mother     Cancer Father           Social History     Tobacco Use    Smoking status: Never    Smokeless tobacco: Never   Vaping Use    Vaping Use: Never used   Substance Use Topics    Alcohol use: No    Drug use: No          ASSESSMENT/PLAN:      1. Lumbar back pain  2.  Spinal stenosis of lumbar region with neurogenic claudication  -     REFERRAL TO PHYSICAL THERAPY  -     methylPREDNISolone (MEDROL DOSEPACK) 4 mg tablet; Per dose pack instructions, Normal, Disp-1 Dose Pack, R-0  -     traMADoL (ULTRAM) 50 mg tablet; Take 1 Tablet by mouth every six (6) hours as needed for Pain for up to 7 days. Max Daily Amount: 200 mg., Normal, Disp-28 Tablet, R-0  3. Lumbar radiculopathy  4. Spondylolisthesis of lumbar region  5. Lumbar spondylosis      Below is the assessment and plan developed based on review of pertinent history, physical exam, labs, studies, and medications. Have discussed the patients diagnosis and radiographic findings at length and have answered all patient questions to her questions to his satisfaction. Have sent a prescription for a steroid taper and tramadol electronically to the patient's preferred pharmacy. The patient has signed a pain contract and the patient's  was reviewed. The patient has severe stenosis at L4-5 however, no gross neurologic deficits. Functionally, the patient is quite limited. The patient has not trialed any conservative treatment to date. Have provided the patient with a prescription for outpatient physical therapy for core strengthening, modalities and instruction in a home exercise program.  Will plan on seeing the patient back for reevaluation in 4 weeks time for reevaluation. The patient understands and agrees to the treatment plan as outlined above. No follow-ups on file. Dr. Imelda Yee was available for immediate consult during this encounter. An electronic signature was used to authenticate this note.     -- Soumya Padron PA-C

## 2022-12-15 NOTE — LETTER
CONTROLLED SUBSTANCE MEDICATION AGREEMENT  Patient Name: Miriam Lennon Sr.  Patient YOB: 1955   [id number]    I understand, that controlled substance medications may be used to help better manage my symptoms and to improve my ability to function at home, work and in social settings. However, I also understand that these medications do have risks, which have been discussed with me, including possible development of physical or psychological dependence. I understand that successful treatment requires mutual trust and honesty between me and my provider. I understand and agree that following this Medication Agreement is necessary in continuing my provider-patient relationship and the success of my treatment plan. Explanation from my Provider: Benefits and Goals of Controlled Substance Medications: There are two potential goals for your treatment: (1) decreased pain and suffering (2) improved daily life functions. There are many possible treatments for your chronic condition(s). Alternatives such as physical therapy, yoga, massage, home daily exercise, meditation, relaxation techniques, injections, chiropractic manipulations, surgery, cognitive therapy, hypnosis and many medications that are not habit-forming may be used. Use of controlled substance medications may be helpful, but they are unlikely to resolve all symptoms or restore all function. Explanation from my Provider: Risks of Controlled Substance Medications:   Opioid pain medications: These medications can lead to problems such as addiction/dependence, sedation, lightheadedness/dizziness, memory issues, falls, constipation, nausea, or vomiting. They may also impair the ability to drive or operate machinery. Additionally, these medications may lower testosterone levels, leading to loss of bone strength, stamina and sex drive.   They may cause problems with breathing, sleep apnea and reduced coughing, which is especially dangerous for patients with lung disease. Overdose or dangerous interactions with alcohol and other medications may occur, leading to death. Hyperalgesia may develop, which means patients receiving opioids for the treatment of pain may become more sensitive to certain painful stimuli, and in some cases, experience pain from ordinarily non-painful stimuli. Women between the ages of 14-53 who could become pregnant should carefully weigh the risks and benefits of opioids with their physicians, as these medications increase the risk of pregnancy complications, including miscarriage,  delivery and stillbirth. It is also possible for babies to be born addicted to opioids. Opioid dependence withdrawal symptoms may include; feelings of uneasiness, increased pain, irritability, belly pain, diarrhea, sweats and goose-flesh. Testosterone replacement therapy:  Potential side effects include increased risk of stroke and heart attack, blood clots, increased blood pressure, increased cholesterol, enlarged prostate, sleep apnea, irritability/aggression and other mood disorders, and decreased fertility. Connell Line Sr. (1955)             Page 1 of 4    Initials:_______    Benzodiazepines and non-benzodiazepine sleep medications: These medications can lead to problems such as addiction/dependence, sedation, fatigue, lightheadedness, dizziness, incoordination, falls, depression, hallucinations, and impaired judgment, memory and concentration. The ability to drive and operate machinery may also be affected. Abnormal sleep-related behaviors have been reported, including sleepwalking, driving, making telephone calls, eating, or having sex while not fully awake. These medications can suppress breathing and worsen sleep apnea, particularly when combined with alcohol or other sedating medications, potentially leading to death.  Dependence withdrawal symptoms may include tremors, anxiety, hallucinations and seizures. Stimulants:  Common adverse effects include addiction/dependence, increased blood pressure and heart rate, decreased appetite, nausea, involuntary weight loss, insomnia,  irritability, and headaches. These risks may increase when these medications are combined with other stimulants, such as caffeine pills or energy drinks, certain weight loss supplements and oral decongestants. Dependence withdrawal symptoms may include depressed mood, loss of interest, suicidal thoughts, anxiety, fatigue, appetite changes and agitation. I agree and understand that I and my prescriber have the following rights and responsibilities regarding my treatment plan:   1. MY RIGHTS:  To be informed of my treatment and medication plan. To be an active participant in my health and wellbeing. 2. MY RESPONSIBILITY AND UNDERSTANDING FOR USE OF MEDICATIONS   I will take medications at the dose and frequency as directed. For my safety, I will not increase or change how I take my medications without the recommendation of my healthcare provider.  I will actively participate in any program recommended by my provider which may improve function, including social, physical, psychological programs.  I will not take my medications with alcohol or other drugs not prescribed to me. I understand that drinking alcohol with my medications increases the chances of side effects, including reduced breathing rate and could lead to personal injury when operating machinery.  I understand that if I have a history of substance use disorders, including alcohol or other illicit drugs, that I may be at increased risk of addiction to my medications.  I agree to notify my provider immediately if I should become pregnant so that my treatment plan can be adjusted.    I agree and understand that I shall only receive controlled substance medications from the prescriber that signed this agreement unless there is written agreement among other prescribers of controlled substances outlining the responsibility of the medications being prescribed.  I understand that the if the controlled medication is not helping to achieve goals, the dosage may be tapered and no longer prescribed. 3. MY RESPONSIBILITY FOR COMMUNICATION / PRESCRIPTION RENEWALS   I agree that all controlled substance medications that I take will be prescribed only by my provider. If another healthcare provider prescribes me medication in an emergency, I will notify my provider within seventy-two (72) hours. Max Aviles Sr. (1955)             Page 2 of 4    Initials:_______  Brenda Hernandez I will arrange for refills at the prescribed interval ONLY during regular office hours. I will not ask for refills earlier than agreed, after-hours, on holidays or weekends. Refills may take up to 72 hours for processing and prescriptions to reach the pharmacy.  I will inform my other health care providers that I am taking these medications and of the existence of this [id number]. In the event of an emergency, I will provide the same information to the emergency department prescribers.  I will keep my provider updated on the pharmacy I am using for controlled medication prescription filling. 4. MY RESPONSIBILITY FOR PROTECTING MEDICATIONS   I will protect my prescriptions and medications. I understand that lost or misplaced prescriptions will not be replaced.  I will keep medications only for my own use and will not share them with others. I will keep all medications away from children.  I agree that if my medications are adjusted or discontinued, I will properly dispose of any remaining medications. I understand that I will be required to dispose of any remaining controlled medications as, directed by my prescriber, prior to being provided with any prescriptions for other controlled medications.   Medication drop box locations can be found at: [employer]  5. MY RESPONSIBILITY WITH ILLEGAL DRUGS    I will not use illegal or street drugs or another person's prescription medications not prescribed to me.  If there are identified addiction type symptoms, then referral to a program may be provided by my provider and I agree to follow through with this recommendation. 6. MY RESPONSIBILITY FOR COOPERATION WITH INVESTIGATIONS   I understand that my provider will comply with any applicable law and may discuss my use and/or possible misuse/abuse of controlled substances and alcohol, as appropriate, with any health care provider involved in my care, pharmacist, or legal authority.  I authorize my provider and pharmacy to cooperate fully with law enforcement agencies (as permitted by law) in the investigation of any possible misuse, sale, or other diversion of my controlled substances.  I agree to waive any applicable privilege or right of privacy or confidentiality with respect to these authorizations. 7. PROVIDERS RIGHT TO MONITOR FOR SAFETY: PRESCRIPTION MONITORING / DRUG TESTING   I consent to drug/toxicology screening and will submit to a drug screen upon my providers request to assure I am only taking the prescribed drugs for my safety monitoring. I understand that a drug screen is a laboratory test in which a sample of my urine, blood or saliva is checked to see what drugs I have been taking. This may entail an observed urine specimen, which means that a nurse or other health care provider may watch me provide urine, and I will cooperate if I am asked to provide an observed specimen. Vera Ramírez Sr. (1955)             Page 3 of 4    Initials:_______  Cici Cat I understand that my provider will check a copy of my State Prescription Monitoring Program () Report in order to safely prescribe medications.      Pill Counts: I consent to pill counts when requested. I may be asked to bring all my prescribed controlled substance medications, in their original bottles, to all of my scheduled appointments. In addition, my provider may ask me to come to the practice at any time for a random pill count. 8. TERMINATION OF THIS AGREEMENT   For my safety, my prescriber has the right to stop prescribing controlled substance medications and may end this agreement.  Conditions that may result in termination of this agreement:  a. I do not show any improvement in pain, or my activity has not improved. b. I develop rapid tolerance or loss of improvement, as described in my treatment plan.  c. I develop significant side effects from the medication. d. My behavior is not consistent with the responsibilities outlined above, thereby causing safety concerns to continue prescribing controlled substance medications. e. I fail to follow the terms of this agreement. f. Other:____________________________     UNDERSTANDING THIS MEDICATION AGREEMENT:    I have read the above and have had all my questions answered. For chronic disease management, I know that my symptoms can be managed with many types of treatments. A chronic medication trial may be part of my treatment, but I must be an active participant in my care. Medication therapy is only one part of my symptom management plan. In some cases, there may be limited scientific evidence to support the chronic use of certain medications to improve symptoms and daily function. Furthermore, in certain circumstances, there may be scientific information that suggests that the use of chronic controlled substances may worsen my symptoms and increase my risk of unintentional death directly related to this medication therapy.   I know that if my provider feels my risk from controlled medications is greater than my benefit, I will have my controlled substance medication(s) compassionately lowered or removed altogether. I further agree to allow this office to contact my HIPAA contact if there are concerns about my safety and use of the controlled medications. I have agreed to use the prescribed controlled substance medications to me as instructed by my provider and as stated in this Medication Agreement. My initial on each page and my signature below shows that I have read each page and I have had the opportunity to ask questions with answers provided by my provider.       Patient Name (Printed): _____________________________________    Patient Signature:  ______________________   Date: _____________      Prescriber Name (Printed): ___________________________________    Prescriber Signature: _____________________  Date: _____________     Suraj Fuller Sr. (1955)             Page 4 of 4

## 2023-01-11 ENCOUNTER — OFFICE VISIT (OUTPATIENT)
Dept: ORTHOPEDIC SURGERY | Age: 68
End: 2023-01-11
Payer: MEDICARE

## 2023-01-11 DIAGNOSIS — M47.816 LUMBAR SPONDYLOSIS: ICD-10-CM

## 2023-01-11 DIAGNOSIS — M54.50 LUMBAR BACK PAIN: Primary | ICD-10-CM

## 2023-01-11 DIAGNOSIS — M54.16 LUMBAR RADICULOPATHY: ICD-10-CM

## 2023-01-11 PROCEDURE — 97110 THERAPEUTIC EXERCISES: CPT | Performed by: PHYSICAL THERAPIST

## 2023-01-11 PROCEDURE — 97162 PT EVAL MOD COMPLEX 30 MIN: CPT | Performed by: PHYSICAL THERAPIST

## 2023-01-11 NOTE — PROGRESS NOTES
atient Name: To Benites.  Date:2023  : 1955  [x]  Patient  Verified  Payor: Esteban Abbasi / Plan: [place] / Product Type: Medicare /    Total Treatment Time (min): 50 min   1:1 Treatment Time ( only): 50 min   Kolton Arteaga PA-C  1. Lumbar back pain  2. Lumbar radiculopathy  3. Lumbar spondylosis      Subjective:    Patient is a 69 y.o. male referred to physical therapy by Keshawn Matos for lumbar radiculopathy and lumbar spondylosis. Patient states he has a long history of back pain. Most recently his pain is mostly in his left low back and left lower extremity. Patient states if he stands for greater than 5 minutes, both of his legs become weak and they feel as though they are going to give way. Patient also complains of his legs tightening up with standing or walking. Patient went to [facility] and had an MRI which showed spondylolisthesis and lumbar disc degeneration. Patient works part-time in a [employer] community and is a . Patient sits primarily for work. Patient rates his pain a 3 out of 10 at least an 8 out of 10 at worst.    Past medical history and medication list can otherwise be reviewed per the EHR. Objective:  AROM  Lumbar  Flex: Fingertips to knees  Ext: 10%  BSB: Fingertips to  lateral knee joint lines  Hamstrings limited to 60 degrees hip flexion  Special Tests: Positive straight leg raise on the left  RACIEL: 38%  Ex: 25 min  Treatment today to include instruction in a home exercise program as well as providing patient with written and visual handouts. Man:     NMR:        All questions were addressed. Assessment:    Patient presents with increased pain and decreased ROM, strength, and mobility consistent with lumbar disc disease and radiculopathy. Patient will benefit from skilled PT to address below goals. Long Term Goals.  8 weeks  Patient will report centralized LBP to be consisitently less than or equal to 1/10 with all home/work/community/recreational ADL activity. Patient will report zero radicular pain with home/work/community/recreational ADL activity. Pt. Will return to premorbid activities such as walking for exercise. Short Term Goals. 2 visits. Patient will demonstrate independence with HEP. Pt. Will sleep through the night without waking 2nd pain    Plan:  Plan of care: Physical therapy consisted of frequency of 2/week for the next 8 weeks. Physical therapy will consist of therapeutic exercise, modalities, patient education, neuromuscular reeducation, manual therapy, therapeutic activity, dry needling, and instruction in home exercise program as appropriate. Eval  Ex: 25 min  Man:   NMR:       The referring physician has reviewed and approved this evaluation and plan of care as noted by the electronic signature attached to note.     Hanna Salvador, MSPT, DPT

## 2023-01-18 ENCOUNTER — OFFICE VISIT (OUTPATIENT)
Dept: ORTHOPEDIC SURGERY | Age: 68
End: 2023-01-18
Payer: MEDICARE

## 2023-01-18 DIAGNOSIS — M54.50 LUMBAR BACK PAIN: Primary | ICD-10-CM

## 2023-01-18 DIAGNOSIS — M54.16 LUMBAR RADICULOPATHY: ICD-10-CM

## 2023-01-18 DIAGNOSIS — M47.816 LUMBAR SPONDYLOSIS: ICD-10-CM

## 2023-01-18 NOTE — PROGRESS NOTES
I have reviewed the notes, assessments, and/or procedures performed by Dameon Nelson PTA, I concur with her/his documentation of Joel Gerardo

## 2023-01-25 ENCOUNTER — OFFICE VISIT (OUTPATIENT)
Dept: ORTHOPEDIC SURGERY | Age: 68
End: 2023-01-25
Payer: MEDICARE

## 2023-01-25 DIAGNOSIS — M47.816 LUMBAR SPONDYLOSIS: ICD-10-CM

## 2023-01-25 DIAGNOSIS — M54.16 LUMBAR RADICULOPATHY: ICD-10-CM

## 2023-01-25 DIAGNOSIS — M54.50 LUMBAR BACK PAIN: Primary | ICD-10-CM

## 2023-01-25 PROCEDURE — 97110 THERAPEUTIC EXERCISES: CPT | Performed by: PHYSICAL THERAPIST

## 2023-01-25 PROCEDURE — 97140 MANUAL THERAPY 1/> REGIONS: CPT | Performed by: PHYSICAL THERAPIST

## 2023-01-25 NOTE — PROGRESS NOTES
PT DAILY TREATMENT NOTE    Patient Name: Aung Sparks.  SJBP:  : 1955  [x]  Patient  Verified  Payor: Guy Dubois / Plan: [place] MEDICARE PART A & B / Product Type: Medicare /    Total Treatment Time (min): 55  1:1 Treatment Time ( [address] only): 30  Referring Provider: Nas Anthony PA-C    1. Lumbar back pain  2. Lumbar radiculopathy  3. Lumbar spondylosis      SUBJECTIVE  Subjective functional status/changes:   [] No changes reported    Patient reports feeling a little better. Not quite as tight. OBJECTIVE/TREATMENT  PROM hamstrings: 60 degrees    Manual Therapy x 15 mins:   Manual assistance with bilateral hamstring and iliopsoas passive flexibility exercises in supine in modified Aaron test position. Patient was positioned in sidelying for neuromuscular down regulation and manual STM/MFR to lumbar paraspinals/QL to decrease hypertonicity and soft tissue restrictions. Lumbosacral mobilizations and sacral float were performed. Neuromuscular Re-education (NMR) x   minutes:  []  Kinesiotaping for   []  Neuromuscular reeducation to the VMO with use of [place] electrical stimulation in conjunction with active contraction and exercises. []  balance/proprioceptive exercises and activities in clinic as listed below as NMR. Therapeutic Exercise x 15 mins:   Strengthening/Endurance/ADL function/Neuromuscular reeducation activities/exercises supervised and completed as listed below. EXERCISE X= completed on  2023   NuStep Man.  10'   LTR x   Piriformis push x   bridging x   HS stretch on stool x   Pall off press 20x green                                                           Added/Changed Exercises:  []  Advanced to address: [] functional strength/ROM deficits [] balance/proprioceptive tasks  []  Modified: [] per subjective reports [] for patient time constraints [] for clinic time constraints    Modality:  []  E-Stim: type _ x _ min     []att   []unatt   []w/ice   []w/heat  [] Ultrasound: []cont   []pulse    _ W/cm2 x _  min   []1MHz   []3MHz  []  Ice pack: post      []  Hot pack: pre    []  Other:     Patient Education: [x] Review HEP    [] Progressed/Changed HEP to include: [] positioning   [] body mechanics   [] transfers   [] heat/ice application      ASSESSMENT    Patient has continued limited lumbar AROM. Tolerating exercise. Patient has limited hamstring flexibility. Encouraged patient to stretch hamstrings throughout the day.      Progress towards goals / Updated goals:    PLAN  [x]  Upgrade activities as tolerated      [x]  Continue plan of care  []  Discharge due to:  []  Other:      Co-treatment by Linsey Ruth, PT 1/25/2023

## 2023-01-26 ENCOUNTER — OFFICE VISIT (OUTPATIENT)
Dept: ORTHOPEDIC SURGERY | Age: 68
End: 2023-01-26
Payer: MEDICARE

## 2023-01-26 VITALS — BODY MASS INDEX: 33.93 KG/M2 | WEIGHT: 256 LBS | HEIGHT: 73 IN

## 2023-01-26 DIAGNOSIS — M48.062 SPINAL STENOSIS OF LUMBAR REGION WITH NEUROGENIC CLAUDICATION: ICD-10-CM

## 2023-01-26 DIAGNOSIS — M54.50 LUMBAR BACK PAIN: Primary | ICD-10-CM

## 2023-01-26 DIAGNOSIS — M47.816 LUMBAR SPONDYLOSIS: ICD-10-CM

## 2023-01-26 DIAGNOSIS — M43.16 SPONDYLOLISTHESIS OF LUMBAR REGION: ICD-10-CM

## 2023-01-26 RX ORDER — TRAMADOL HYDROCHLORIDE 50 MG/1
50 TABLET ORAL
Qty: 28 TABLET | Refills: 0 | Status: SHIPPED | OUTPATIENT
Start: 2023-01-26 | End: 2023-02-02

## 2023-01-26 RX ORDER — CHLORPHENIRAMINE/PSEUDOEPHED 4 MG-60 MG
TABLET ORAL
COMMUNITY

## 2023-01-26 RX ORDER — IBUPROFEN 100 MG/5ML
SUSPENSION, ORAL (FINAL DOSE FORM) ORAL
COMMUNITY

## 2023-01-26 NOTE — PROGRESS NOTES
Maira Muñoz Sr. (: 1955) is a 69 y.o. male patient here for evaluation of the following chief complaint(s):  LOW BACK PAIN         ASSESSMENT/PLAN:  Below is the assessment and plan developed based on review of pertinent history, physical exam, labs, studies, and medications. 1. Lumbar back pain  -     REFERRAL TO PHYSICAL THERAPY  2. Lumbar spondylosis  -     REFERRAL TO PHYSICAL THERAPY  3. Spinal stenosis of lumbar region with neurogenic claudication  -     traMADoL (Ultram) 50 mg tablet; Take 1 Tablet by mouth every six (6) hours as needed for Pain for up to 7 days. Max Daily Amount: 200 mg. Indications: pain, Normal, Disp-28 Tablet, R-0Supervising physician: Harvey Honeycutt MD [id number]  -     REFERRAL TO PHYSICAL THERAPY  4. Spondylolisthesis of lumbar region  -     REFERRAL TO PHYSICAL THERAPY      Have discussed the patient's diagnosis and radiographic findings at length and have answered all patient questions to his satisfaction. The patient has severe stenosis at L4-L5 but is minimally symptomatic at this time. He does have some mild-moderate back pain but no neurologic deficits. Have discussed potential benefits, risks of epidural steroid injections however, patient states at this time his pain does not warrant injection therapy. Have advised the use of OTC Tylenol 1000 mg up to 3 times daily as needed for pain relief. Have sent a prescription for tramadol for severe pain electronically to the patient's preferred pharmacy. Have provided the patient with a prescription for outpatient physical therapy for core strengthening, modalities and instruction in a home exercise program.  Have encouraged patient to continue home exercise program, weight loss and walking program as tolerated. Have discussed warning symptoms with the patient including numbness, tingling, weakness and bowel/bladder incontinence. Will plan on seeing the patient back for reevaluation on an as-needed basis.   The patient understands and agrees to the treatment plan as outlined above. No follow-ups on file. SUBJECTIVE/OBJECTIVE:  Justin Cohen Sr. (: 1955) is a 69 y.o. male who presents today for the following:  Chief Complaint   Patient presents with    [address]     Patient returns for follow-up evaluation of lumbar back pain which has been worsening in severity over the past 1 year. Patient has a history of very severe lumbar stenosis L4-L5 and was referred to outpatient physical therapy on his last visit. The patient states that his symptoms were initially severe but following several weeks of outpatient physical therapy he states that his symptoms have shown some gradual improvement. He continues to have moderate lumbar back pain. Localizes pain in the bilateral lower lumbar region with radiation of symptoms symptoms into the buttocks bilaterally. Patient states symptoms mainly present with standing and walking. Symptoms are improved with sitting. He said his standing tolerance is less than 10 minutes and walking tolerance about a block. Patient states that he walks about a block and has to rest for short period that he is able to once again walk little bit further. The patient states that he is not taking any pain medication on a routine basis. The patient rates his average daily pain level as a 5/10, seated in the examination room today. Patient denies bowel/bladder incontinence or saddle paresthesia. Absent    IMAGING:  XR Results (most recent):  Results from Hospital Encounter encounter on 22    XR CHEST PA LAT    Narrative  EXAM: XR CHEST PA LAT    INDICATION: Chest Pain    COMPARISON: Chest x-ray 2019. FINDINGS: PA and lateral radiographs of the chest demonstrate clear lungs. The  cardiac and mediastinal contours and pulmonary vascularity are normal.  Atherosclerotic calcifications affect the aortic arch and the thoracic aorta is  tortuous.  The bones and soft tissues are within normal limits. Impression  No acute findings. MRI Results (most recent):  Results from [facility] encounter on 11/16/22    MRI LUMB SPINE WO CONT    Narrative  EXAM: MRI LUMB SPINE WO CONT  Clinical history: Low back pain  INDICATION: . Low back pain, myelopathy    COMPARISON: None    TECHNIQUE: MR imaging of the lumbar spine was performed using the following  sequences: sagittal T1, T2, STIR;  axial T1, T2.    CONTRAST:  None. FINDINGS:    There is 4 mm anterolisthesis of L4 on L5. There is congenital narrowing of the  lumbar canal. Discogenic marrow degenerative changes. Vertebral body heights are  maintained. Scattered small Schmorl's nodes. There is redundancy of the cauda  equina. The conus medullaris terminates at L2. Signal and caliber of the distal spinal  cord are within normal limits. The paraspinal soft tissues are within normal limits. Lower thoracic spine: No herniation or stenosis. L1-L2: No herniation or stenosis. L2-L3: Disc desiccation and loss of disc height. Facet arthropathy. Ligament  flavum hypertrophy. Disc bulge. The canal is patent. Foramina are patent    L3-L4: Moderate facet arthropathy. Ligamentum flavum hypertrophy. Facet  hypertrophy. Disc bulge. Canal is patent. There is minimal right foraminal  stenosis. L4-L5: Mild facet arthropathy. Severe ligamentum flavum hypertrophy. Minimal  anterolisthesis. Circumferential disc protrusion. There is very severe central  canal stenosis with redundancy of the cauda equina. Mild left foraminal  stenosis. L5-S1: Disc desiccation. Disc bulge. Mild facet arthropathy. Canal is patent. Moderate subarticular stenosis on the left    Impression  Multilevel disc and facet degenerative change with minimal spondylolisthesis. There is very severe canal stenosis at L4-5 with severe ligamentum flavum  hypertrophy.  Redundancy of the cauda equina is related to severe canal stenosis  at L4-5.    Additional, less severe degenerative findings are as described in detail above. Allergies   Allergen Reactions    Shellfish Containing Products Rash       Current Outpatient Medications   Medication Sig    ascorbic acid, vitamin C, (VITAMIN C) 1,000 mg tablet     calcium polycarbophiL (Fiber) 625 mg tablet     traMADoL (Ultram) 50 mg tablet Take 1 Tablet by mouth every six (6) hours as needed for Pain for up to 7 days. Max Daily Amount: 200 mg. Indications: pain    tamsulosin (Flomax) 0.4 mg capsule Take 2 Capsules by mouth daily. finasteride (Proscar) 5 mg tablet Take 1 Tablet by mouth daily. finasteride (PROSCAR) 5 mg tablet Take 1 Tablet by mouth daily. TAKE 1 TABLET BY MOUTH EVERY DAY around 1 pm    atenoloL (TENORMIN) 50 mg tablet Take 1 Tablet by mouth nightly. dimethicone/zinc oxide (VANICREAM DIAPER RASH EX) APPLY LIBERAL AMOUNT TO AFFECTED AREA TWICE A DAY APPLY TO ALL DRY SKIN AREAS FOR MOISTRURIZATION    halobetasol (ULTRAVATE) 0.05 % topical cream     calcipotriene (DOVONEX) 0.005 % topical cream     FIBER CHOICE PO Take  by mouth. cholecalciferol (VITAMIN D3) (2,000 UNITS /50 MCG) cap capsule Take 2,000 Units by mouth daily. vitamin E (AQUA GEMS) 400 unit capsule Take 400 Units by mouth daily. vitamin e (E GEMS) 1,000 unit capsule Take 1,000 Units by mouth daily. amLODIPine (NORVASC) 5 mg tablet Take 1 Tablet by mouth daily. TAKE 1 TABLET DAILY    colchicine 0.6 mg tablet Take 2 tablets at the onset of your gout and may repeat in 4 hours with 1 tablet then 1 tablet daily as needed    tamsulosin (FLOMAX) 0.4 mg capsule Take 1 Capsule by mouth daily. clotrimazole-betamethasone (LOTRISONE) topical cream Apply  to affected area two (2) times a day. No current facility-administered medications for this visit.        Past Medical History:   Diagnosis Date    BPH (benign prostatic hyperplasia)     Chest pain 09/17/2022    er    Gout     Hamstring muscle strain 03/06/2019    Hypercholesterolemia     Hypertension     IGT (impaired glucose tolerance) 08/01/2018    Neurogenic claudication ([address] 06/29/2022    Prostatism     S/P colonoscopic polypectomy 05/31/2017    colon polyp Joaquin Ren MD    S/P colonoscopy with polypectomy 10/01/2020    Esteban Mchugh md tubular adenoma - repeat in  5 yrs    Sleep apnea     uses CPAP    Trigger thumb of right hand 07/17/2019        Past Surgical History:   Procedure Laterality Date    COLONOSCOPY N/A 5/31/2017    COLONOSCOPY performed by Joaquin Ren MD at [facility] ENDOSCOPY    COLONOSCOPY N/A 9/30/2020    COLONOSCOPY performed by Constance Coleman MD at [facility] ENDOSCOPY    HX COLONOSCOPY      HX OTHER SURGICAL      ingrown toenail repair       Family History   Problem Relation Age of Onset    Cancer Mother     Hypertension Mother     Cancer Father         Social History     Tobacco Use    Smoking status: Never    Smokeless tobacco: Never   Substance Use Topics    Alcohol use: No        Review of Systems   Musculoskeletal:  Positive for back pain. All other systems reviewed and are negative. Pain Assessment  1/26/2023   Location of Pain Back;Buttocks   Location Modifiers Right;Left;Posterior   Severity of Pain 5   Quality of Pain Aching   Limiting Behavior Yes   Relieving Factors -   Result of Injury No           Vitals:  Ht 6' 1\" (1.854 m)   Wt 256 lb (116.1 kg)   BMI 33.78 kg/m²    Body mass index is 33.78 kg/m². Physical ExamHave discussed the patient's diagnosis and radiographic findings at length and have answered all patient questions to his satisfaction. Have sent a prescription for NSAIDs and steroids electronically to the patient's preferred pharmacy.  Have provided the patient with a prescription for outpatient physical therapy for core strengthening, modalities and instruction in a home exercise program. Will plan on seeing the patient back for reevaluation in 4-6 weeks time should symptoms persist or worsen. The patient understands and agrees to the treatment plan as outlined above. Neurologic  Sensory  Light Touch - Intact - Globally. Overall Assessment of Muscle Strength and Tone reveals  Lower Extremities - Right Iliopsoas - 5/5. Left Iliopsoas - 5/5. Right Tibialis Anterior - 5/5. Left Tibialis Anterior - 5/5. Right Gastroc-Soleus - 5/5. Left Gastroc-Soleus - 5/5. Right EHL - 5/5. Left EHL - 5/5. General Assessment of Reflexes  Right Ankle - Clonus is not present. Left Ankle - Clonus is not present. Reflexes (Dermatomes)  Normal in the bilateral knees and absent in the bilateral ankles - Left Achilles (L5-S2), Left Knee (L2-4), Right Achilles (L5-S2) and Right Knee (L2-4). Musculoskeletal  Global Assessment  Examination of related systems reveals - well-developed, well-nourished, in no acute distress, alert and oriented x 3. Gait and Station - normal gait and station and normal posture. Right Lower Extremity - normal strength and tone, normal range of motion without pain and no instability, subluxation or laxity. Left Lower Extremity - normal strength and tone, normal range of motion without pain and no instability, subluxation or laxity. Spine/Ribs/Pelvis  Cervical Spine - Examination of the cervical spine reveals - no tenderness to palpation, no pain, no swelling, edema or erythema, normal cervical spine movements and normal sensation. Thoracic (Dorsal) Spine - Examination of the thoracic spine reveals - no tenderness over thoracic vertebrae, no pain, normal sensation and normal thoracic spine movements. Lumbosacral Spine - Examination of the lumbosacral spine reveals - no known fractures or deformities. Inspection and Palpation - Tenderness - none. Assessment of pain reveals the following findings - The pain is characterized as - moderate. Location - pain refers to lower back bilaterally. ROM - Trunk Extension - 20 degrees. Lumbar Spine Flexion - 80 degrees.  Lumbosacral Spine - Functional Testing - Babinski Test negative, Straight Leg Raising Test negative. Dr. Ivan Christy was available for immediate consult during this encounter. An electronic signature was used to authenticate this note.   -- JOSÉ LUIS GarciaC

## 2023-01-26 NOTE — LETTER
CONTROLLED SUBSTANCE MEDICATION AGREEMENT  Patient Name: Gianna iSmms Sr.  Patient YOB: 1955   [id number]    I understand, that controlled substance medications may be used to help better manage my symptoms and to improve my ability to function at home, work and in social settings. However, I also understand that these medications do have risks, which have been discussed with me, including possible development of physical or psychological dependence. I understand that successful treatment requires mutual trust and honesty between me and my provider. I understand and agree that following this Medication Agreement is necessary in continuing my provider-patient relationship and the success of my treatment plan. Explanation from my Provider: Benefits and Goals of Controlled Substance Medications: There are two potential goals for your treatment: (1) decreased pain and suffering (2) improved daily life functions. There are many possible treatments for your chronic condition(s). Alternatives such as physical therapy, yoga, massage, home daily exercise, meditation, relaxation techniques, injections, chiropractic manipulations, surgery, cognitive therapy, hypnosis and many medications that are not habit-forming may be used. Use of controlled substance medications may be helpful, but they are unlikely to resolve all symptoms or restore all function. Explanation from my Provider: Risks of Controlled Substance Medications:   Opioid pain medications: These medications can lead to problems such as addiction/dependence, sedation, lightheadedness/dizziness, memory issues, falls, constipation, nausea, or vomiting. They may also impair the ability to drive or operate machinery. Additionally, these medications may lower testosterone levels, leading to loss of bone strength, stamina and sex drive.   They may cause problems with breathing, sleep apnea and reduced coughing, which is especially dangerous for patients with lung disease. Overdose or dangerous interactions with alcohol and other medications may occur, leading to death. Hyperalgesia may develop, which means patients receiving opioids for the treatment of pain may become more sensitive to certain painful stimuli, and in some cases, experience pain from ordinarily non-painful stimuli. Women between the ages of 14-53 who could become pregnant should carefully weigh the risks and benefits of opioids with their physicians, as these medications increase the risk of pregnancy complications, including miscarriage,  delivery and stillbirth. It is also possible for babies to be born addicted to opioids. Opioid dependence withdrawal symptoms may include; feelings of uneasiness, increased pain, irritability, belly pain, diarrhea, sweats and goose-flesh. Testosterone replacement therapy:  Potential side effects include increased risk of stroke and heart attack, blood clots, increased blood pressure, increased cholesterol, enlarged prostate, sleep apnea, irritability/aggression and other mood disorders, and decreased fertility. Erin Ga Sr. (1955)             Page 1 of 4    Initials:_______    Benzodiazepines and non-benzodiazepine sleep medications: These medications can lead to problems such as addiction/dependence, sedation, fatigue, lightheadedness, dizziness, incoordination, falls, depression, hallucinations, and impaired judgment, memory and concentration. The ability to drive and operate machinery may also be affected. Abnormal sleep-related behaviors have been reported, including sleepwalking, driving, making telephone calls, eating, or having sex while not fully awake. These medications can suppress breathing and worsen sleep apnea, particularly when combined with alcohol or other sedating medications, potentially leading to death.  Dependence withdrawal symptoms may include tremors, anxiety, hallucinations and seizures. Stimulants:  Common adverse effects include addiction/dependence, increased blood pressure and heart rate, decreased appetite, nausea, involuntary weight loss, insomnia,  irritability, and headaches. These risks may increase when these medications are combined with other stimulants, such as caffeine pills or energy drinks, certain weight loss supplements and oral decongestants. Dependence withdrawal symptoms may include depressed mood, loss of interest, suicidal thoughts, anxiety, fatigue, appetite changes and agitation. I agree and understand that I and my prescriber have the following rights and responsibilities regarding my treatment plan:   1. MY RIGHTS:  To be informed of my treatment and medication plan. To be an active participant in my health and wellbeing. 2. MY RESPONSIBILITY AND UNDERSTANDING FOR USE OF MEDICATIONS   I will take medications at the dose and frequency as directed. For my safety, I will not increase or change how I take my medications without the recommendation of my healthcare provider.  I will actively participate in any program recommended by my provider which may improve function, including social, physical, psychological programs.  I will not take my medications with alcohol or other drugs not prescribed to me. I understand that drinking alcohol with my medications increases the chances of side effects, including reduced breathing rate and could lead to personal injury when operating machinery.  I understand that if I have a history of substance use disorders, including alcohol or other illicit drugs, that I may be at increased risk of addiction to my medications.  I agree to notify my provider immediately if I should become pregnant so that my treatment plan can be adjusted.    I agree and understand that I shall only receive controlled substance medications from the prescriber that signed this agreement unless there is written agreement among other prescribers of controlled substances outlining the responsibility of the medications being prescribed.  I understand that the if the controlled medication is not helping to achieve goals, the dosage may be tapered and no longer prescribed. 3. MY RESPONSIBILITY FOR COMMUNICATION / PRESCRIPTION RENEWALS   I agree that all controlled substance medications that I take will be prescribed only by my provider. If another healthcare provider prescribes me medication in an emergency, I will notify my provider within seventy-two (72) hours. Kathy Richmond Sr. (1955)             Page 2 of 4    Initials:_______  Tangela Rojas I will arrange for refills at the prescribed interval ONLY during regular office hours. I will not ask for refills earlier than agreed, after-hours, on holidays or weekends. Refills may take up to 72 hours for processing and prescriptions to reach the pharmacy.  I will inform my other health care providers that I am taking these medications and of the existence of this [id number]. In the event of an emergency, I will provide the same information to the emergency department prescribers.  I will keep my provider updated on the pharmacy I am using for controlled medication prescription filling. 4. MY RESPONSIBILITY FOR PROTECTING MEDICATIONS   I will protect my prescriptions and medications. I understand that lost or misplaced prescriptions will not be replaced.  I will keep medications only for my own use and will not share them with others. I will keep all medications away from children.  I agree that if my medications are adjusted or discontinued, I will properly dispose of any remaining medications. I understand that I will be required to dispose of any remaining controlled medications as, directed by my prescriber, prior to being provided with any prescriptions for other controlled medications.   Medication drop box locations can be found at: [employer]  5. MY RESPONSIBILITY WITH ILLEGAL DRUGS    I will not use illegal or street drugs or another person's prescription medications not prescribed to me.  If there are identified addiction type symptoms, then referral to a program may be provided by my provider and I agree to follow through with this recommendation. 6. MY RESPONSIBILITY FOR COOPERATION WITH INVESTIGATIONS   I understand that my provider will comply with any applicable law and may discuss my use and/or possible misuse/abuse of controlled substances and alcohol, as appropriate, with any health care provider involved in my care, pharmacist, or legal authority.  I authorize my provider and pharmacy to cooperate fully with law enforcement agencies (as permitted by law) in the investigation of any possible misuse, sale, or other diversion of my controlled substances.  I agree to waive any applicable privilege or right of privacy or confidentiality with respect to these authorizations. 7. PROVIDERS RIGHT TO MONITOR FOR SAFETY: PRESCRIPTION MONITORING / DRUG TESTING   I consent to drug/toxicology screening and will submit to a drug screen upon my providers request to assure I am only taking the prescribed drugs for my safety monitoring. I understand that a drug screen is a laboratory test in which a sample of my urine, blood or saliva is checked to see what drugs I have been taking. This may entail an observed urine specimen, which means that a nurse or other health care provider may watch me provide urine, and I will cooperate if I am asked to provide an observed specimen. Yanelis Quintana Sr. (1955)             Page 3 of 4    Initials:_______  Justin Benson I understand that my provider will check a copy of my State Prescription Monitoring Program () Report in order to safely prescribe medications.      Pill Counts: I consent to pill counts when requested. I may be asked to bring all my prescribed controlled substance medications, in their original bottles, to all of my scheduled appointments. In addition, my provider may ask me to come to the practice at any time for a random pill count. 8. TERMINATION OF THIS AGREEMENT   For my safety, my prescriber has the right to stop prescribing controlled substance medications and may end this agreement.  Conditions that may result in termination of this agreement:  a. I do not show any improvement in pain, or my activity has not improved. b. I develop rapid tolerance or loss of improvement, as described in my treatment plan.  c. I develop significant side effects from the medication. d. My behavior is not consistent with the responsibilities outlined above, thereby causing safety concerns to continue prescribing controlled substance medications. e. I fail to follow the terms of this agreement. f. Other:____________________________     UNDERSTANDING THIS MEDICATION AGREEMENT:    I have read the above and have had all my questions answered. For chronic disease management, I know that my symptoms can be managed with many types of treatments. A chronic medication trial may be part of my treatment, but I must be an active participant in my care. Medication therapy is only one part of my symptom management plan. In some cases, there may be limited scientific evidence to support the chronic use of certain medications to improve symptoms and daily function. Furthermore, in certain circumstances, there may be scientific information that suggests that the use of chronic controlled substances may worsen my symptoms and increase my risk of unintentional death directly related to this medication therapy.   I know that if my provider feels my risk from controlled medications is greater than my benefit, I will have my controlled substance medication(s) compassionately lowered or removed altogether. I further agree to allow this office to contact my HIPAA contact if there are concerns about my safety and use of the controlled medications. I have agreed to use the prescribed controlled substance medications to me as instructed by my provider and as stated in this Medication Agreement. My initial on each page and my signature below shows that I have read each page and I have had the opportunity to ask questions with answers provided by my provider.       Patient Name (Printed): _____________________________________    Patient Signature:  ______________________   Date: _____________      Prescriber Name (Printed): ___________________________________    Prescriber Signature: _____________________  Date: _____________     Erikax Anjali Suggs (1955)             Page 4 of 4

## 2023-02-01 ENCOUNTER — OFFICE VISIT (OUTPATIENT)
Dept: INTERNAL MEDICINE CLINIC | Age: 68
End: 2023-02-01
Payer: MEDICARE

## 2023-02-01 VITALS
WEIGHT: 265.2 LBS | BODY MASS INDEX: 35.15 KG/M2 | HEART RATE: 84 BPM | HEIGHT: 73 IN | SYSTOLIC BLOOD PRESSURE: 137 MMHG | RESPIRATION RATE: 18 BRPM | OXYGEN SATURATION: 98 % | DIASTOLIC BLOOD PRESSURE: 80 MMHG | TEMPERATURE: 98.3 F

## 2023-02-01 DIAGNOSIS — E11.9 TYPE 2 DIABETES MELLITUS WITHOUT COMPLICATION, WITHOUT LONG-TERM CURRENT USE OF INSULIN (HCC): ICD-10-CM

## 2023-02-01 DIAGNOSIS — G95.19 NEUROGENIC CLAUDICATION (HCC): ICD-10-CM

## 2023-02-01 DIAGNOSIS — E66.01 SEVERE OBESITY WITH BODY MASS INDEX (BMI) OF 35.0 TO 39.9 WITH SERIOUS COMORBIDITY (HCC): ICD-10-CM

## 2023-02-01 DIAGNOSIS — J01.90 ACUTE SINUSITIS, RECURRENCE NOT SPECIFIED, UNSPECIFIED LOCATION: Primary | ICD-10-CM

## 2023-02-01 PROBLEM — J32.9 SINUSITIS: Status: ACTIVE | Noted: 2023-02-01

## 2023-02-01 RX ORDER — CEFUROXIME AXETIL 500 MG/1
500 TABLET ORAL 2 TIMES DAILY
Qty: 20 TABLET | Refills: 0 | Status: SHIPPED | OUTPATIENT
Start: 2023-02-01 | End: 2023-02-11

## 2023-02-01 RX ORDER — FLUTICASONE PROPIONATE 50 MCG
2 SPRAY, SUSPENSION (ML) NASAL DAILY
Qty: 1 EACH | Refills: 11 | Status: SHIPPED | OUTPATIENT
Start: 2023-02-01

## 2023-02-01 NOTE — PROGRESS NOTES
SPORTS MEDICINE AND PRIMARY [facility]ARE  Kristel Dhaliwal MD, [address] [address]  Phone:  [phone]  Fax: [phone]       Chief Complaint   Patient presents with    Sinus Infection   . SUBJECTIVE:    Connell Line  is a 69 y.o. male Patient returns today complaining of sinus infection with a history of diabetes, neurogenic claudication, severe obesity and is seen for evaluation. He has sinus issues. He talked to the pharmacist, who gave him some medication that did not help. He has facial pain, particularly bothersome at night. It seems to help when he showers. He has to breathe through his mouth because the congestion is so great. Patient is seen for evaluation. He also complains of a chopping sound of his right jaw, no pain, just the annoyance of the sound. Current Outpatient Medications   Medication Sig Dispense Refill    cefUROXime (CEFTIN) 500 mg tablet Take 1 Tablet by mouth two (2) times a day for 10 days. 20 Tablet 0    sodium chloride (OCEAN) 0.65 % nasal squeeze bottle 0.1 mL by Both Nostrils route four (4) times daily. 45 mL 11    fluticasone propionate (FLONASE) 50 mcg/actuation nasal spray 2 Sprays by Both Nostrils route daily. 1 Each 11    ascorbic acid, vitamin C, (VITAMIN C) 1,000 mg tablet       calcium polycarbophiL (Fiber) 625 mg tablet       traMADoL (Ultram) 50 mg tablet Take 1 Tablet by mouth every six (6) hours as needed for Pain for up to 7 days. Max Daily Amount: 200 mg. Indications: pain 28 Tablet 0    tamsulosin (Flomax) 0.4 mg capsule Take 2 Capsules by mouth daily. 120 Capsule 5    finasteride (Proscar) 5 mg tablet Take 1 Tablet by mouth daily. 90 Tablet 3    finasteride (PROSCAR) 5 mg tablet Take 1 Tablet by mouth daily. TAKE 1 TABLET BY MOUTH EVERY DAY around 1 pm 90 Tablet 3    atenoloL (TENORMIN) 50 mg tablet Take 1 Tablet by mouth nightly.  90 Tablet 3    dimethicone/zinc oxide (VANICREAM DIAPER RASH EX) APPLY LIBERAL AMOUNT TO AFFECTED AREA TWICE A DAY APPLY TO ALL DRY SKIN AREAS FOR MOISTRURIZATION      halobetasol (ULTRAVATE) 0.05 % topical cream       calcipotriene (DOVONEX) 0.005 % topical cream       FIBER CHOICE PO Take  by mouth. cholecalciferol (VITAMIN D3) (2,000 UNITS /50 MCG) cap capsule Take 2,000 Units by mouth daily. vitamin E (AQUA GEMS) 400 unit capsule Take 400 Units by mouth daily. vitamin e (E GEMS) 1,000 unit capsule Take 1,000 Units by mouth daily. amLODIPine (NORVASC) 5 mg tablet Take 1 Tablet by mouth daily. TAKE 1 TABLET DAILY 90 Tablet 3    colchicine 0.6 mg tablet Take 2 tablets at the onset of your gout and may repeat in 4 hours with 1 tablet then 1 tablet daily as needed 90 Tablet 3    tamsulosin (FLOMAX) 0.4 mg capsule Take 1 Capsule by mouth daily. 90 Capsule 3    clotrimazole-betamethasone (LOTRISONE) topical cream Apply  to affected area two (2) times a day.  45 g 11     Past Medical History:   Diagnosis Date    BPH (benign prostatic hyperplasia)     Chest pain 09/17/2022    er    DM2 (diabetes mellitus, type 2) ([facility]     Gout     Hamstring muscle strain 03/06/2019    Hypercholesterolemia     Hypertension     IGT (impaired glucose tolerance) 08/01/2018    Neurogenic claudication ([facility] [address] 06/29/2022    Prostatism     S/P colonoscopic polypectomy 05/31/2017    colon polyp Svetlana Chambers MD    S/P colonoscopy with polypectomy 10/01/2020    Esteban Quezada md tubular adenoma - repeat in  5 yrs    Sinusitis 02/01/2023    Sleep apnea     uses CPAP    Trigger thumb of right hand 07/17/2019     Past Surgical History:   Procedure Laterality Date    COLONOSCOPY N/A 5/31/2017    COLONOSCOPY performed by Svetlana Chambers MD at [facility] ENDOSCOPY    COLONOSCOPY N/A 9/30/2020    COLONOSCOPY performed by Rosemarie Cullen MD at [facility] ENDOSCOPY    HX COLONOSCOPY      HX OTHER SURGICAL      ingrown toenail repair     Allergies   Allergen Reactions    Shellfish Containing Products Rash         REVIEW OF SYSTEMS:  General: negative for - chills or fever  ENT: negative for - headaches, nasal congestion or tinnitus  Respiratory: negative for - cough, hemoptysis, shortness of breath or wheezing  Cardiovascular : negative for - chest pain, edema, palpitations or shortness of breath  Gastrointestinal: negative for - abdominal pain, blood in stools, heartburn or nausea/vomiting  Genito-Urinary: no dysuria, trouble voiding, or hematuria  Musculoskeletal: negative for - gait disturbance, joint pain, joint stiffness or joint swelling  Neurological: no TIA or stroke symptoms  Hematologic: no bruises, no bleeding, no swollen glands  Integument: no lumps, mole changes, nail changes or rash  Endocrine: no malaise/lethargy or unexpected weight changes      Social History     Socioeconomic History    Marital status:    Tobacco Use    Smoking status: Never    Smokeless tobacco: Never   Vaping Use    Vaping Use: Never used   Substance and Sexual Activity    Alcohol use: No    Drug use: No    Sexual activity: Yes     Partners: Female   Social History Narrative    Habits:  He is a lifetime nonsmoker, non drinker, non drug abuser. Social History:  The patient is , lives with his wife. 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 No grandchildren yet. He is a member of [employer]. Family History:  Father  76 of lung cancer. He was a cigarette smoker. Mother  68 of cancer. He has ten siblings.  One brother , which he thinks he was murdered, but the official cause was he jumped off a bridge and they think he was also doing drugs     Family History   Problem Relation Age of Onset    Cancer Mother     Hypertension Mother     Cancer Father        OBJECTIVE:    Visit Vitals  /80 (BP 1 Location: Left upper arm, BP Patient Position: Sitting)   Pulse 84   Temp 98.3 °F (36.8 °C) (Oral)   Resp 18 Ht 6' 1\" (1.854 m)   Wt 265 lb 3.2 oz (120.3 kg)   SpO2 98%   BMI 34.99 kg/m²     CONSTITUTIONAL: well , well nourished, appears age appropriate  EYES: perrla, eom intact  ENMT:moist mucous membranes, pharynx clear  NECK: supple. Thyroid normal  RESPIRATORY: Chest: clear bilaterally   CARDIOVASCULAR: Heart: regular rate and rhythm  GASTROINTESTINAL: Abdomen: soft, bowel sounds active  HEMATOLOGIC: no pathological lymph nodes palpated  MUSCULOSKELETAL: Extremities: no edema, pulse 1+   INTEGUMENT: No unusual rashes or suspicious skin lesions noted. Nails appear normal.  NEUROLOGIC: non-focal exam   MENTAL STATUS: alert and oriented, appropriate affect           ASSESSMENT:  1. Acute sinusitis, recurrence not specified, unspecified location    2. Type 2 diabetes mellitus without complication, without long-term current use of insulin ([address]    3. Neurogenic claudication ([address]    4. Severe obesity with body mass index (BMI) of 35.0 to 39.9 with serious comorbidity ([address]      He has acute sinusitis, for which we will treat with antibiotics, saline nasal spray and steroid nasal spray and suggested humidification. Type 2 diabetes will be assessed with hemoglobin A1c, which has been excellent. He has neurogenic claudication related to the findings of the MRI performed on 11/16/22 with multilevel disc and facet degenerative change with minimal spondylolisthesis and very severe canal stenosis at L4-5 with severe ligamentum flavum hypertrophy, redundancy of the cauda equina, which was related to the severe canal stenosis at L4-5. In addition, there was less severe degenerative changes at the other levels.   He saw orthopedics, Dr. Christy Mejias, who referred him to physical therapy and told him to come back to his office if he started having pain again, at which point he was going to do an epidural.    Obesity remains a concern and we encouraged a heart healthy, weight reducing diet as he has gained 11 pounds since we last saw him. He will be back to see me in four to six months, sooner if he has any problems. I have discussed the diagnosis with the patient and the intended plan as seen in the  Orders. The patient understands and agees with the plan. The patient has   received an after visit summary and questions were answered concerning  future plans  Patient labs and/or xrays were reviewed  Past records were reviewed. PLAN:  .  Orders Placed This Encounter    RENAL FUNCTION PANEL    HEMOGLOBIN A1C WITH EAG    cefUROXime (CEFTIN) 500 mg tablet    sodium chloride (OCEAN) 0.65 % nasal squeeze bottle    fluticasone propionate (FLONASE) 50 mcg/actuation nasal spray       Follow-up and Dispositions    Return in about 4 months (around 6/1/2023). ATTENTION:   This medical record was transcribed using an electronic medical records system. Although proofread, it may and can contain electronic and spelling errors. Other human spelling and other errors may be present. Corrections may be executed at a later time. Please feel free to contact us for any clarifications as needed.

## 2023-02-01 NOTE — PROGRESS NOTES
Riky Casanova is a 69 y.o. male    Chief Complaint   Patient presents with    Sinus Infection     1. Have you been to the ER, urgent care clinic since your last visit? Hospitalized since your last visit? No    2. Have you seen or consulted any other health care providers outside of the [address] [place] [address] since your last visit? Include any pap smears or colon screening.  No

## 2023-02-02 LAB
ALBUMIN SERPL-MCNC: 3.8 G/DL (ref 3.5–5)
ANION GAP SERPL CALC-SCNC: 6 MMOL/L (ref 5–15)
BUN SERPL-MCNC: 8 MG/DL (ref 6–20)
BUN/CREAT SERPL: 7 (ref 12–20)
CALCIUM SERPL-MCNC: 9.1 MG/DL (ref 8.5–10.1)
CHLORIDE SERPL-SCNC: 106 MMOL/L (ref 97–108)
CO2 SERPL-SCNC: 26 MMOL/L (ref 21–32)
CREAT SERPL-MCNC: 1.22 MG/DL (ref 0.7–1.3)
EST. AVERAGE GLUCOSE BLD GHB EST-MCNC: 140 MG/DL
GLUCOSE SERPL-MCNC: 187 MG/DL (ref 65–100)
HBA1C MFR BLD: 6.5 % (ref 4–5.6)
PHOSPHATE SERPL-MCNC: 1.9 MG/DL (ref 2.6–4.7)
POTASSIUM SERPL-SCNC: 3.9 MMOL/L (ref 3.5–5.1)
SODIUM SERPL-SCNC: 138 MMOL/L (ref 136–145)

## 2023-03-03 PROBLEM — J32.9 SINUSITIS: Status: RESOLVED | Noted: 2023-02-01 | Resolved: 2023-03-03

## 2023-03-23 NOTE — PROGRESS NOTES
1. Have you been to the ER, urgent care clinic since your last visit? Hospitalized since your last visit? No    2. Have you seen or consulted any other health care providers outside of the [address] [place] [address] since your last visit? Include any pap smears or colon screening. 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 Assessment/Plan   Education and counseling provided:  Are appropriate based on today's review and evaluation         Depression Risk Factor Screening     3 most recent PHQ Screens 7/21/2021   Little interest or pleasure in doing things Not at all   Feeling down, depressed, irritable, or hopeless Not at all   Total Score PHQ 2 0       Alcohol Risk Screen    Do you average more than 1 drink per night or more than 7 drinks a week: No    In the past three months have you have had more than 4 drinks containing alcohol on one occasion: No        Functional Ability and Level of Safety    Hearing: Hearing is good. Activities of Daily Living: The home contains: no safety equipment. Patient does total self care      Ambulation: with no difficulty     Fall Risk:  Fall Risk Assessment, last 12 mths 7/21/2021   Able to walk? Yes   Fall in past 12 months? 0   Do you feel unsteady?  0   Are you worried about falling 0      Abuse Screen:  Patient is not abused       Cognitive Screening    Has your family/caregiver stated any concerns about your memory: no     Cognitive Screening: not necessary    Health Maintenance Due     Health Maintenance Due   Topic Date Due    Eye Exam Retinal or Dilated  Never done    COVID-19 Vaccine (1) Never done    DTaP/Tdap/Td series (1 - Tdap) Never done    Shingrix Vaccine Age 50> (1 The nurse was out today and measured her wounds the one was 2.8 x 3.8 and that is now 2.5x 3.2 and the other one right below that was 0.4x2.8 and now is 0.4x1.5 what are your thoughts she does go see the foot dr tomorrow of 2) Never done    Pneumococcal 65+ years (2 of 2 - PPSV23) 11/07/2020    Medicare Yearly Exam  01/15/2021    Foot Exam Q1  01/15/2021    A1C test (Diabetic or Prediabetic)  01/15/2021    MICROALBUMIN Q1  01/15/2021       Patient Care Team   Patient Care Team:  Dominic Mckenna MD as PCP - General (Internal Medicine)  Dominic Mckenna MD as PCP - Jessica Nix Provider    History     Patient Active Problem List   Diagnosis Code    HTN (hypertension) I10    Hypertriglyceridemia E78.1    Obstructive sleep apnea G47.33    Prostatism N40.0    Gout M10.9    Insulin resistance E88.81    Severe obesity with body mass index (BMI) of 35.0 to 39.9 with serious comorbidity ([address] E66.01    Type 2 diabetes with nephropathy ([facility] E11.21    Hamstring muscle strain S76.319A    Trigger thumb of right hand M65.311    S/P colonoscopic polypectomy Z98.890    S/P colonoscopy with polypectomy Z98.890     Past Medical History:   Diagnosis Date    BPH (benign prostatic hyperplasia)     Gout     Hamstring muscle strain 03/06/2019    Hypercholesterolemia     Hypertension     IGT (impaired glucose tolerance) 08/01/2018    Prostatism     S/P colonoscopic polypectomy 05/31/2017    colon polyp France Mcburney, MD    S/P colonoscopy with polypectomy 10/01/2020    Esteban Gerber md tubular adenoma - repeat in  5 yrs    Sleep apnea     uses CPAP    Trigger thumb of right hand 07/17/2019      Past Surgical History:   Procedure Laterality Date    COLONOSCOPY N/A 5/31/2017    COLONOSCOPY performed by France Mcburney, MD at [facility] ENDOSCOPY    COLONOSCOPY N/A 9/30/2020    COLONOSCOPY performed by Emily Edwards MD at [facility] ENDOSCOPY    HX COLONOSCOPY      HX OTHER SURGICAL      ingrown toenail repair     Current Outpatient Medications   Medication Sig Dispense Refill    colchicine 0.6 mg tablet Take 2 tablets at the onset of your gout and may repeat in 4 hours with 1 tablet then 1 tablet daily as needed 30 Tab 11    tamsulosin (FLOMAX) 0.4 mg capsule Take 1 Cap by mouth daily. 90 Cap 3    finasteride (PROSCAR) 5 mg tablet TAKE 1 TABLET BY MOUTH EVERY DAY around 1 pm 90 Tab 4    atenoloL (TENORMIN) 50 mg tablet Take 1 Tab by mouth nightly. 90 Tab 3    amLODIPine (NORVASC) 5 mg tablet TAKE 1 TABLET DAILY 90 Tab 3    emollient topical cream Apply  to affected area three (3) times daily.  453 g 11     No Known Allergies    Family History   Problem Relation Age of Onset    Cancer Mother     Hypertension Mother     Cancer Father      Social History     Tobacco Use    Smoking status: Never Smoker    Smokeless tobacco: Never Used   Substance Use Topics    Alcohol use: No         Shelly Bryan LPN

## 2023-06-06 SDOH — ECONOMIC STABILITY: INCOME INSECURITY: HOW HARD IS IT FOR YOU TO PAY FOR THE VERY BASICS LIKE FOOD, HOUSING, MEDICAL CARE, AND HEATING?: NOT HARD AT ALL

## 2023-06-06 SDOH — ECONOMIC STABILITY: FOOD INSECURITY: WITHIN THE PAST 12 MONTHS, YOU WORRIED THAT YOUR FOOD WOULD RUN OUT BEFORE YOU GOT MONEY TO BUY MORE.: SOMETIMES TRUE

## 2023-06-06 SDOH — ECONOMIC STABILITY: HOUSING INSECURITY
IN THE LAST 12 MONTHS, WAS THERE A TIME WHEN YOU DID NOT HAVE A STEADY PLACE TO SLEEP OR SLEPT IN A SHELTER (INCLUDING NOW)?: NO

## 2023-06-06 SDOH — ECONOMIC STABILITY: FOOD INSECURITY: WITHIN THE PAST 12 MONTHS, THE FOOD YOU BOUGHT JUST DIDN'T LAST AND YOU DIDN'T HAVE MONEY TO GET MORE.: SOMETIMES TRUE

## 2023-06-06 SDOH — ECONOMIC STABILITY: TRANSPORTATION INSECURITY
IN THE PAST 12 MONTHS, HAS LACK OF TRANSPORTATION KEPT YOU FROM MEETINGS, WORK, OR FROM GETTING THINGS NEEDED FOR DAILY LIVING?: NO

## 2023-06-07 ENCOUNTER — OFFICE VISIT (OUTPATIENT)
Facility: CLINIC | Age: 68
End: 2023-06-07
Payer: MEDICARE

## 2023-06-07 VITALS
WEIGHT: 256.5 LBS | RESPIRATION RATE: 20 BRPM | TEMPERATURE: 98 F | BODY MASS INDEX: 34 KG/M2 | HEIGHT: 73 IN | HEART RATE: 60 BPM | SYSTOLIC BLOOD PRESSURE: 136 MMHG | DIASTOLIC BLOOD PRESSURE: 77 MMHG | OXYGEN SATURATION: 99 %

## 2023-06-07 DIAGNOSIS — I10 PRIMARY HYPERTENSION: ICD-10-CM

## 2023-06-07 DIAGNOSIS — E11.9 TYPE 2 DIABETES MELLITUS WITHOUT COMPLICATION, WITH LONG-TERM CURRENT USE OF INSULIN (HCC): Primary | ICD-10-CM

## 2023-06-07 DIAGNOSIS — G47.33 OBSTRUCTIVE SLEEP APNEA: ICD-10-CM

## 2023-06-07 DIAGNOSIS — E78.5 DYSLIPIDEMIA: ICD-10-CM

## 2023-06-07 DIAGNOSIS — E78.1 HYPERTRIGLYCERIDEMIA: ICD-10-CM

## 2023-06-07 DIAGNOSIS — N40.0 PROSTATISM: ICD-10-CM

## 2023-06-07 DIAGNOSIS — Z79.4 TYPE 2 DIABETES MELLITUS WITHOUT COMPLICATION, WITH LONG-TERM CURRENT USE OF INSULIN (HCC): Primary | ICD-10-CM

## 2023-06-07 PROBLEM — E66.01 SEVERE OBESITY WITH BODY MASS INDEX (BMI) OF 35.0 TO 39.9 WITH SERIOUS COMORBIDITY (HCC): Status: RESOLVED | Noted: 2018-08-01 | Resolved: 2023-06-07

## 2023-06-07 LAB
CHOLEST SERPL-MCNC: 146 MG/DL
EST. AVERAGE GLUCOSE BLD GHB EST-MCNC: 126 MG/DL
HBA1C MFR BLD: 6 % (ref 4–5.6)
HDLC SERPL-MCNC: 51 MG/DL
HDLC SERPL: 2.9 (ref 0–5)
LDLC SERPL CALC-MCNC: 56.6 MG/DL (ref 0–100)
PSA SERPL-MCNC: 0.2 NG/ML (ref 0.01–4)
TRIGL SERPL-MCNC: 192 MG/DL
VLDLC SERPL CALC-MCNC: 38.4 MG/DL

## 2023-06-07 PROCEDURE — 2022F DILAT RTA XM EVC RTNOPTHY: CPT | Performed by: INTERNAL MEDICINE

## 2023-06-07 PROCEDURE — 36415 COLL VENOUS BLD VENIPUNCTURE: CPT | Performed by: INTERNAL MEDICINE

## 2023-06-07 PROCEDURE — 3078F DIAST BP <80 MM HG: CPT | Performed by: INTERNAL MEDICINE

## 2023-06-07 PROCEDURE — 3075F SYST BP GE 130 - 139MM HG: CPT | Performed by: INTERNAL MEDICINE

## 2023-06-07 PROCEDURE — G8427 DOCREV CUR MEDS BY ELIG CLIN: HCPCS | Performed by: INTERNAL MEDICINE

## 2023-06-07 PROCEDURE — 1036F TOBACCO NON-USER: CPT | Performed by: INTERNAL MEDICINE

## 2023-06-07 PROCEDURE — 3017F COLORECTAL CA SCREEN DOC REV: CPT | Performed by: INTERNAL MEDICINE

## 2023-06-07 PROCEDURE — 99214 OFFICE O/P EST MOD 30 MIN: CPT | Performed by: INTERNAL MEDICINE

## 2023-06-07 PROCEDURE — 1123F ACP DISCUSS/DSCN MKR DOCD: CPT | Performed by: INTERNAL MEDICINE

## 2023-06-07 PROCEDURE — 3044F HG A1C LEVEL LT 7.0%: CPT | Performed by: INTERNAL MEDICINE

## 2023-06-07 PROCEDURE — G8419 CALC BMI OUT NRM PARAM NOF/U: HCPCS | Performed by: INTERNAL MEDICINE

## 2023-06-07 RX ORDER — METFORMIN HYDROCHLORIDE 500 MG/1
TABLET, EXTENDED RELEASE ORAL
COMMUNITY
Start: 2023-03-03

## 2023-06-07 ASSESSMENT — PATIENT HEALTH QUESTIONNAIRE - PHQ9
1. LITTLE INTEREST OR PLEASURE IN DOING THINGS: 0
SUM OF ALL RESPONSES TO PHQ QUESTIONS 1-9: 0
2. FEELING DOWN, DEPRESSED OR HOPELESS: 0
SUM OF ALL RESPONSES TO PHQ QUESTIONS 1-9: 0
SUM OF ALL RESPONSES TO PHQ QUESTIONS 1-9: 0
SUM OF ALL RESPONSES TO PHQ9 QUESTIONS 1 & 2: 0
SUM OF ALL RESPONSES TO PHQ QUESTIONS 1-9: 0

## 2023-06-07 ASSESSMENT — ANXIETY QUESTIONNAIRES
7. FEELING AFRAID AS IF SOMETHING AWFUL MIGHT HAPPEN: 0
GAD7 TOTAL SCORE: 0
1. FEELING NERVOUS, ANXIOUS, OR ON EDGE: 0
3. WORRYING TOO MUCH ABOUT DIFFERENT THINGS: 0
5. BEING SO RESTLESS THAT IT IS HARD TO SIT STILL: 0
4. TROUBLE RELAXING: 0
2. NOT BEING ABLE TO STOP OR CONTROL WORRYING: 0
IF YOU CHECKED OFF ANY PROBLEMS ON THIS QUESTIONNAIRE, HOW DIFFICULT HAVE THESE PROBLEMS MADE IT FOR YOU TO DO YOUR WORK, TAKE CARE OF THINGS AT HOME, OR GET ALONG WITH OTHER PEOPLE: NOT DIFFICULT AT ALL
6. BECOMING EASILY ANNOYED OR IRRITABLE: 0

## 2023-06-07 NOTE — PROGRESS NOTES
Gini Del Real is a 69 y.o. male    Chief Complaint   Patient presents with    3 Month Follow-Up     1. Have you been to the ER, urgent care clinic since your last visit? Hospitalized since your last visit? No    2. Have you seen or consulted any other health care providers outside of the [address] [place] [address] since your last visit? Include any pap smears or colon screening.  No
(around 10/7/2023). ATTENTION:   This medical record was transcribed using an electronic medical records system. Although proofread, it may and can contain electronic and spelling errors. Other human spelling and other errors may be present. Corrections may be executed at a later time. Please feel free to contact us for any clarifications as needed.

## 2023-08-07 RX ORDER — ATENOLOL 50 MG/1
50 TABLET ORAL NIGHTLY
Qty: 90 TABLET | Refills: 3 | Status: SHIPPED | OUTPATIENT
Start: 2023-08-07

## 2023-08-07 RX ORDER — FINASTERIDE 5 MG/1
5 TABLET, FILM COATED ORAL DAILY
Qty: 90 TABLET | Refills: 3 | Status: SHIPPED | OUTPATIENT
Start: 2023-08-07

## 2023-08-22 ENCOUNTER — PATIENT MESSAGE (OUTPATIENT)
Facility: CLINIC | Age: 68
End: 2023-08-22

## 2023-08-22 NOTE — TELEPHONE ENCOUNTER
From: Alexa Sharma Sr. To: Dr. Natasha Higgins: 8/22/2023 10:05 AM EDT  Subject: Amlodipine 5 mg Oral Tab    Good morning,  I have sparkly 9 tabs left on this medication and request a refill. There's no refills left at [facility]. [employer]. I would like to start picking this medicine at [place] [facility] and [place] as soon as possible. Thanks and have a good day.     Alvarado

## 2023-08-23 RX ORDER — AMLODIPINE BESYLATE 5 MG/1
5 TABLET ORAL DAILY
Qty: 30 TABLET | Refills: 11 | Status: SHIPPED | OUTPATIENT
Start: 2023-08-23

## 2023-09-14 RX ORDER — ATENOLOL 50 MG/1
50 TABLET ORAL NIGHTLY
Qty: 90 TABLET | Refills: 3 | Status: SHIPPED | OUTPATIENT
Start: 2023-09-14

## 2023-10-11 ENCOUNTER — OFFICE VISIT (OUTPATIENT)
Facility: CLINIC | Age: 68
End: 2023-10-11
Payer: MEDICARE

## 2023-10-11 VITALS
HEIGHT: 73 IN | WEIGHT: 253.8 LBS | OXYGEN SATURATION: 98 % | TEMPERATURE: 97.5 F | BODY MASS INDEX: 33.64 KG/M2 | SYSTOLIC BLOOD PRESSURE: 133 MMHG | HEART RATE: 64 BPM | DIASTOLIC BLOOD PRESSURE: 76 MMHG | RESPIRATION RATE: 18 BRPM

## 2023-10-11 DIAGNOSIS — I10 PRIMARY HYPERTENSION: ICD-10-CM

## 2023-10-11 DIAGNOSIS — M48.062 SPINAL STENOSIS OF LUMBAR REGION WITH NEUROGENIC CLAUDICATION: ICD-10-CM

## 2023-10-11 DIAGNOSIS — N40.0 PROSTATISM: ICD-10-CM

## 2023-10-11 DIAGNOSIS — G47.33 OBSTRUCTIVE SLEEP APNEA: ICD-10-CM

## 2023-10-11 DIAGNOSIS — M54.16 LUMBAR RADICULOPATHY: ICD-10-CM

## 2023-10-11 DIAGNOSIS — Z11.59 ENCOUNTER FOR SCREENING FOR OTHER VIRAL DISEASES: ICD-10-CM

## 2023-10-11 DIAGNOSIS — R29.818 NEUROGENIC CLAUDICATION: ICD-10-CM

## 2023-10-11 DIAGNOSIS — Z98.890 S/P COLONOSCOPY WITH POLYPECTOMY: ICD-10-CM

## 2023-10-11 DIAGNOSIS — Z79.4 TYPE 2 DIABETES MELLITUS WITHOUT COMPLICATION, WITH LONG-TERM CURRENT USE OF INSULIN (HCC): Primary | ICD-10-CM

## 2023-10-11 DIAGNOSIS — E11.9 TYPE 2 DIABETES MELLITUS WITHOUT COMPLICATION, WITH LONG-TERM CURRENT USE OF INSULIN (HCC): Primary | ICD-10-CM

## 2023-10-11 DIAGNOSIS — E78.1 HYPERTRIGLYCERIDEMIA: ICD-10-CM

## 2023-10-11 DIAGNOSIS — R04.2 HEMOPTYSIS: ICD-10-CM

## 2023-10-11 LAB
ALBUMIN SERPL-MCNC: 3.9 G/DL (ref 3.5–5)
ALBUMIN/GLOB SERPL: 1 (ref 1.1–2.2)
ALP SERPL-CCNC: 76 U/L (ref 45–117)
ALT SERPL-CCNC: 31 U/L (ref 12–78)
ANION GAP SERPL CALC-SCNC: 5 MMOL/L (ref 5–15)
APPEARANCE UR: CLEAR
AST SERPL-CCNC: 19 U/L (ref 15–37)
BACTERIA URNS QL MICRO: NEGATIVE /HPF
BASOPHILS # BLD: 0.1 K/UL (ref 0–0.1)
BASOPHILS NFR BLD: 1 % (ref 0–1)
BILIRUB SERPL-MCNC: 1.2 MG/DL (ref 0.2–1)
BILIRUB UR QL: NEGATIVE
BUN SERPL-MCNC: 9 MG/DL (ref 6–20)
BUN/CREAT SERPL: 8 (ref 12–20)
CALCIUM SERPL-MCNC: 9.3 MG/DL (ref 8.5–10.1)
CHLORIDE SERPL-SCNC: 106 MMOL/L (ref 97–108)
CO2 SERPL-SCNC: 29 MMOL/L (ref 21–32)
COLOR UR: NORMAL
CREAT SERPL-MCNC: 1.2 MG/DL (ref 0.7–1.3)
CREAT UR-MCNC: 105 MG/DL
DIFFERENTIAL METHOD BLD: ABNORMAL
EOSINOPHIL # BLD: 0.2 K/UL (ref 0–0.4)
EOSINOPHIL NFR BLD: 4 % (ref 0–7)
EPITH CASTS URNS QL MICRO: NORMAL /LPF
ERYTHROCYTE [DISTWIDTH] IN BLOOD BY AUTOMATED COUNT: 14.6 % (ref 11.5–14.5)
EST. AVERAGE GLUCOSE BLD GHB EST-MCNC: 123 MG/DL
GLOBULIN SER CALC-MCNC: 3.8 G/DL (ref 2–4)
GLUCOSE SERPL-MCNC: 117 MG/DL (ref 65–100)
GLUCOSE UR STRIP.AUTO-MCNC: NEGATIVE MG/DL
HBA1C MFR BLD: 5.9 % (ref 4–5.6)
HBV SURFACE AB SER QL: NONREACTIVE
HBV SURFACE AB SER-ACNC: 6.7 MIU/ML
HBV SURFACE AG SER QL: <0.1 INDEX
HBV SURFACE AG SER QL: NEGATIVE
HCT VFR BLD AUTO: 45.2 % (ref 36.6–50.3)
HGB BLD-MCNC: 14.6 G/DL (ref 12.1–17)
HGB UR QL STRIP: NEGATIVE
HYALINE CASTS URNS QL MICRO: NORMAL /LPF (ref 0–5)
IMM GRANULOCYTES # BLD AUTO: 0 K/UL (ref 0–0.04)
IMM GRANULOCYTES NFR BLD AUTO: 0 % (ref 0–0.5)
KETONES UR QL STRIP.AUTO: NEGATIVE MG/DL
LEUKOCYTE ESTERASE UR QL STRIP.AUTO: NEGATIVE
LYMPHOCYTES # BLD: 1.8 K/UL (ref 0.8–3.5)
LYMPHOCYTES NFR BLD: 37 % (ref 12–49)
MCH RBC QN AUTO: 28.3 PG (ref 26–34)
MCHC RBC AUTO-ENTMCNC: 32.3 G/DL (ref 30–36.5)
MCV RBC AUTO: 87.6 FL (ref 80–99)
MICROALBUMIN UR-MCNC: 1.02 MG/DL
MICROALBUMIN/CREAT UR-RTO: 10 MG/G (ref 0–30)
MONOCYTES # BLD: 0.6 K/UL (ref 0–1)
MONOCYTES NFR BLD: 12 % (ref 5–13)
NEUTS SEG # BLD: 2.3 K/UL (ref 1.8–8)
NEUTS SEG NFR BLD: 46 % (ref 32–75)
NITRITE UR QL STRIP.AUTO: NEGATIVE
NRBC # BLD: 0 K/UL (ref 0–0.01)
NRBC BLD-RTO: 0 PER 100 WBC
PH UR STRIP: 7 (ref 5–8)
PLATELET # BLD AUTO: 198 K/UL (ref 150–400)
PMV BLD AUTO: 10.6 FL (ref 8.9–12.9)
POTASSIUM SERPL-SCNC: 4 MMOL/L (ref 3.5–5.1)
PROT SERPL-MCNC: 7.7 G/DL (ref 6.4–8.2)
PROT UR STRIP-MCNC: NEGATIVE MG/DL
RBC # BLD AUTO: 5.16 M/UL (ref 4.1–5.7)
RBC #/AREA URNS HPF: NORMAL /HPF (ref 0–5)
SODIUM SERPL-SCNC: 140 MMOL/L (ref 136–145)
SP GR UR REFRACTOMETRY: 1.01 (ref 1–1.03)
UROBILINOGEN UR QL STRIP.AUTO: 0.2 EU/DL (ref 0.2–1)
WBC # BLD AUTO: 4.9 K/UL (ref 4.1–11.1)
WBC URNS QL MICRO: NORMAL /HPF (ref 0–4)

## 2023-10-11 PROCEDURE — 90694 VACC AIIV4 NO PRSRV 0.5ML IM: CPT | Performed by: INTERNAL MEDICINE

## 2023-10-11 PROCEDURE — 1123F ACP DISCUSS/DSCN MKR DOCD: CPT | Performed by: INTERNAL MEDICINE

## 2023-10-11 PROCEDURE — G8484 FLU IMMUNIZE NO ADMIN: HCPCS | Performed by: INTERNAL MEDICINE

## 2023-10-11 PROCEDURE — 3044F HG A1C LEVEL LT 7.0%: CPT | Performed by: INTERNAL MEDICINE

## 2023-10-11 PROCEDURE — 99214 OFFICE O/P EST MOD 30 MIN: CPT | Performed by: INTERNAL MEDICINE

## 2023-10-11 PROCEDURE — 3017F COLORECTAL CA SCREEN DOC REV: CPT | Performed by: INTERNAL MEDICINE

## 2023-10-11 PROCEDURE — 36415 COLL VENOUS BLD VENIPUNCTURE: CPT | Performed by: INTERNAL MEDICINE

## 2023-10-11 PROCEDURE — G8427 DOCREV CUR MEDS BY ELIG CLIN: HCPCS | Performed by: INTERNAL MEDICINE

## 2023-10-11 PROCEDURE — G0008 ADMIN INFLUENZA VIRUS VAC: HCPCS | Performed by: INTERNAL MEDICINE

## 2023-10-11 PROCEDURE — 3075F SYST BP GE 130 - 139MM HG: CPT | Performed by: INTERNAL MEDICINE

## 2023-10-11 PROCEDURE — 3078F DIAST BP <80 MM HG: CPT | Performed by: INTERNAL MEDICINE

## 2023-10-11 PROCEDURE — 1036F TOBACCO NON-USER: CPT | Performed by: INTERNAL MEDICINE

## 2023-10-11 PROCEDURE — G8417 CALC BMI ABV UP PARAM F/U: HCPCS | Performed by: INTERNAL MEDICINE

## 2023-10-11 PROCEDURE — 2022F DILAT RTA XM EVC RTNOPTHY: CPT | Performed by: INTERNAL MEDICINE

## 2023-10-11 RX ORDER — AZITHROMYCIN 250 MG/1
250 TABLET, FILM COATED ORAL SEE ADMIN INSTRUCTIONS
Qty: 6 TABLET | Refills: 1 | Status: SHIPPED | OUTPATIENT
Start: 2023-10-11 | End: 2023-10-16

## 2023-10-11 ASSESSMENT — PATIENT HEALTH QUESTIONNAIRE - PHQ9
SUM OF ALL RESPONSES TO PHQ QUESTIONS 1-9: 0
1. LITTLE INTEREST OR PLEASURE IN DOING THINGS: 0
SUM OF ALL RESPONSES TO PHQ9 QUESTIONS 1 & 2: 0
2. FEELING DOWN, DEPRESSED OR HOPELESS: 0
SUM OF ALL RESPONSES TO PHQ QUESTIONS 1-9: 0
2. FEELING DOWN, DEPRESSED OR HOPELESS: 0
SUM OF ALL RESPONSES TO PHQ QUESTIONS 1-9: 0
SUM OF ALL RESPONSES TO PHQ QUESTIONS 1-9: 0
1. LITTLE INTEREST OR PLEASURE IN DOING THINGS: 0
SUM OF ALL RESPONSES TO PHQ9 QUESTIONS 1 & 2: 0
SUM OF ALL RESPONSES TO PHQ QUESTIONS 1-9: 0

## 2023-10-11 ASSESSMENT — ANXIETY QUESTIONNAIRES
7. FEELING AFRAID AS IF SOMETHING AWFUL MIGHT HAPPEN: 0
GAD7 TOTAL SCORE: 0
2. NOT BEING ABLE TO STOP OR CONTROL WORRYING: 0
IF YOU CHECKED OFF ANY PROBLEMS ON THIS QUESTIONNAIRE, HOW DIFFICULT HAVE THESE PROBLEMS MADE IT FOR YOU TO DO YOUR WORK, TAKE CARE OF THINGS AT HOME, OR GET ALONG WITH OTHER PEOPLE: NOT DIFFICULT AT ALL
4. TROUBLE RELAXING: 0
3. WORRYING TOO MUCH ABOUT DIFFERENT THINGS: 0
6. BECOMING EASILY ANNOYED OR IRRITABLE: 0
5. BEING SO RESTLESS THAT IT IS HARD TO SIT STILL: 0
1. FEELING NERVOUS, ANXIOUS, OR ON EDGE: 0

## 2023-10-11 ASSESSMENT — LIFESTYLE VARIABLES
HOW MANY STANDARD DRINKS CONTAINING ALCOHOL DO YOU HAVE ON A TYPICAL DAY: PATIENT DOES NOT DRINK
HOW OFTEN DO YOU HAVE A DRINK CONTAINING ALCOHOL: NEVER

## 2023-10-11 NOTE — PROGRESS NOTES
Aidee Holland  is a 77 y.o. male    Chief Complaint   Patient presents with    Follow-up       1. Have you been to the ER, urgent care clinic since your last visit? Hospitalized since your last visit? No    2. Have you seen or consulted any other health care providers outside of the [address] [place] [address] Avenue since your last visit? Include any pap smears or colon screening.  No
Occurrences:   1     Standing Expiration Date:   10/11/2024    Microalbumin / Creatinine Urine Ratio     Standing Status:   Future     Number of Occurrences:   1     Standing Expiration Date:   10/11/2024    Hemoglobin A1C     Standing Status:   Future     Number of Occurrences:   1     Standing Expiration Date:   10/11/2024    CBC with Auto Differential     Standing Status:   Future     Number of Occurrences:   1     Standing Expiration Date:   10/11/2024    Comprehensive Metabolic Panel    Urinalysis with Microscopic     Standing Status:   Future     Number of Occurrences:   1     Standing Expiration Date:   10/11/2024     Order Specific Question:   SPECIFY(EX-CATH,MIDSTREAM,CYSTO,ETC)? Answer:   ms    ERLINDA Perez MD, Gastroenterology, [facility]     Referral Priority:   Routine     Referral Type:   Eval and Treat     Referral Reason:   Specialty Services Required     Referred to Provider:   Jenise Christensen MD     Requested Specialty:   Gastroenterology     Number of Visits Requested:   1    ERLINDA Frankel MD, Neurosurgery, [place] (Yue Natarajan)     Referral Priority:   Routine     Referral Type:   Eval and Treat     Referral Reason:   Specialty Services Required     Referred to Provider:   Malaika Springer MD     Requested Specialty:   Neurosurgery     Number of Visits Requested:   1    [place] COLLECTION VENOUS BLOOD VENIPUNCTURE     DIABETES FOOT EXAM        Follow-up and Dispositions    Return in about 4 months (around 2/11/2024). ATTENTION:   This medical record was transcribed using an electronic medical records system. Although proofread, it may and can contain electronic and spelling errors. Other human spelling and other errors may be present. Corrections may be executed at a later time. Please feel free to contact us for any clarifications as needed.

## 2023-10-13 LAB — HBV CORE AB SERPL QL IA: NEGATIVE

## 2023-10-25 ENCOUNTER — HOSPITAL ENCOUNTER (OUTPATIENT)
Facility: HOSPITAL | Age: 68
Discharge: HOME | End: 2023-10-28
Attending: INTERNAL MEDICINE
Payer: MEDICARE

## 2023-10-25 DIAGNOSIS — R91.1 PULMONARY NODULE, RIGHT: Primary | ICD-10-CM

## 2023-10-25 DIAGNOSIS — R04.2 HEMOPTYSIS: ICD-10-CM

## 2023-10-25 PROCEDURE — 71250 CT THORAX DX C-: CPT

## 2023-12-09 ENCOUNTER — HOSPITAL ENCOUNTER (EMERGENCY)
Facility: HOSPITAL | Age: 68
Discharge: HOME | End: 2023-12-09
Payer: MEDICARE

## 2023-12-09 VITALS
HEIGHT: 73 IN | TEMPERATURE: 99.1 F | OXYGEN SATURATION: 100 % | WEIGHT: 255.51 LBS | SYSTOLIC BLOOD PRESSURE: 157 MMHG | DIASTOLIC BLOOD PRESSURE: 86 MMHG | RESPIRATION RATE: 18 BRPM | HEART RATE: 85 BPM | BODY MASS INDEX: 33.86 KG/M2

## 2023-12-09 DIAGNOSIS — K59.09 OTHER CONSTIPATION: ICD-10-CM

## 2023-12-09 DIAGNOSIS — K64.4 EXTERNAL HEMORRHOID: Primary | ICD-10-CM

## 2023-12-09 DIAGNOSIS — R05.3 CHRONIC COUGH: ICD-10-CM

## 2023-12-09 PROCEDURE — 99283 EMERGENCY DEPT VISIT LOW MDM: CPT

## 2023-12-09 RX ORDER — POLYETHYLENE GLYCOL 3350 17 G/17G
17 POWDER, FOR SOLUTION ORAL DAILY PRN
Qty: 510 G | Refills: 0 | Status: SHIPPED | OUTPATIENT
Start: 2023-12-09 | End: 2024-06-06

## 2023-12-09 RX ORDER — FAMOTIDINE 20 MG/1
20 TABLET, FILM COATED ORAL DAILY
Qty: 30 TABLET | Refills: 0 | Status: SHIPPED | OUTPATIENT
Start: 2023-12-09

## 2023-12-09 ASSESSMENT — PAIN SCALES - GENERAL: PAINLEVEL_OUTOF10: 5

## 2023-12-09 ASSESSMENT — PAIN - FUNCTIONAL ASSESSMENT: PAIN_FUNCTIONAL_ASSESSMENT: 0-10

## 2023-12-09 ASSESSMENT — PAIN DESCRIPTION - LOCATION: LOCATION: RECTUM

## 2023-12-10 NOTE — ED NOTES
Patient discharged from the ED by KODY Ackerman. Diagnosis, medications, precautions and follow-ups were reviewed with the patient/family. Questions were asked and answered prior to departure. Patient departed the ED via walk-out and was accompanied by wife.        Paddy Suggs RN  12/09/23 1846

## 2023-12-10 NOTE — ED PROVIDER NOTES
Clinician of Record. GENNY López NP (electronically signed)    I have seen and evaluated the patient autonomously. My supervision physician was on site and available for consultation if needed. (Please note that parts of this dictation were completed with voice recognition software. Quite often unanticipated grammatical, syntax, homophones, and other interpretive errors are inadvertently transcribed by the computer software. Please disregards these errors.  Please excuse any errors that have escaped final proofreading.)         GENNY López NP  12/11/23 0109

## 2023-12-11 RX ORDER — TAMSULOSIN HYDROCHLORIDE 0.4 MG/1
0.8 CAPSULE ORAL DAILY
Qty: 120 CAPSULE | Refills: 5 | Status: SHIPPED | OUTPATIENT
Start: 2023-12-11

## 2023-12-14 NOTE — PROGRESS NOTES
PT DAILY TREATMENT NOTE    Patient Name: Jose Irving.  Date:2023  : 1955  [x]  Patient  Verified  Payor: Erica Listen / Plan: [place] MEDICARE PART A & B / Product Type: Medicare /    Total Treatment Time (min): 55  1:1 Treatment Time ( [address] only): 40  Referring Provider: Hansa Rust PA-C    1. Lumbar back pain  2. Lumbar radiculopathy  3. Lumbar spondylosis      SUBJECTIVE  Subjective functional status/changes:   [] No changes reported    Patient reports he generally feels the same so far- has been doing HEP but would like to review hip flexor stretching. OBJECTIVE/TREATMENT    Manual Therapy x 15 mins:   Manual assistance with bilateral hamstring and iliopsoas passive flexibility exercises in supine in modified Aaron test position. Patient was positioned in sidelying for neuromuscular down regulation and manual STM/MFR to lumbar paraspinals/QL to decrease hypertonicity and soft tissue restrictions. Lumbosacral mobilizations and sacral float were performed. Neuromuscular Re-education (NMR) x   minutes:  []  Kinesiotaping for   []  Neuromuscular reeducation to the VMO with use of [place] electrical stimulation in conjunction with active contraction and exercises. []  balance/proprioceptive exercises and activities in clinic as listed below as NMR. Therapeutic Exercise x 25 mins:   Strengthening/Endurance/ADL function/Neuromuscular reeducation activities/exercises supervised and completed as listed below. EXERCISE X= completed on  2023   Janet Man.  10'   LTR x   Piriformis push x   bridging x   HS stretch on stool x                                                               Added/Changed Exercises:  []  Advanced to address: [] functional strength/ROM deficits [] balance/proprioceptive tasks  []  Modified: [] per subjective reports [] for patient time constraints [] for clinic time constraints    Modality:  []  E-Stim: type _ x _ min     []att   []unatt   []w/ice Writer faxed pain management referral, patient demographics, and Dr. Medellin's last office note to Dr. Rock, fax #: [phone]. Fax confirmation received.     []w/heat  []  Ultrasound: []cont   []pulse    _ W/cm2 x _  min   []1MHz   []3MHz  []  Ice pack: post      []  Hot pack: pre    []  Other:     Patient Education: [] Review HEP    [x] Progressed/Changed HEP to include: standing HF stretching  [] positioning   [] body mechanics   [] transfers   [] heat/ice application      ASSESSMENT    He is tight with lumbar mobilizations and hamstring stretching. Subjectively feels good after today's treatment.      Progress towards goals / Updated goals:    PLAN  [x]  Upgrade activities as tolerated      [x]  Continue plan of care  []  Discharge due to:  []  Other:      Co-treatment by Tierra Mccormack PTA 1/18/2023

## 2024-02-14 ENCOUNTER — OFFICE VISIT (OUTPATIENT)
Facility: CLINIC | Age: 69
End: 2024-02-14
Payer: MEDICARE

## 2024-02-14 VITALS
HEART RATE: 56 BPM | DIASTOLIC BLOOD PRESSURE: 90 MMHG | BODY MASS INDEX: 34.27 KG/M2 | OXYGEN SATURATION: 98 % | SYSTOLIC BLOOD PRESSURE: 145 MMHG | HEIGHT: 73 IN | WEIGHT: 258.6 LBS | TEMPERATURE: 97.6 F | RESPIRATION RATE: 16 BRPM

## 2024-02-14 DIAGNOSIS — Z00.00 MEDICARE ANNUAL WELLNESS VISIT, SUBSEQUENT: Primary | ICD-10-CM

## 2024-02-14 DIAGNOSIS — L98.9 SKIN LESION: ICD-10-CM

## 2024-02-14 DIAGNOSIS — I10 PRIMARY HYPERTENSION: ICD-10-CM

## 2024-02-14 DIAGNOSIS — R91.1 PULMONARY NODULE, RIGHT: ICD-10-CM

## 2024-02-14 DIAGNOSIS — E11.9 TYPE 2 DIABETES MELLITUS WITHOUT COMPLICATION, WITHOUT LONG-TERM CURRENT USE OF INSULIN (HCC): ICD-10-CM

## 2024-02-14 DIAGNOSIS — G47.33 OBSTRUCTIVE SLEEP APNEA: ICD-10-CM

## 2024-02-14 DIAGNOSIS — E78.1 HYPERTRIGLYCERIDEMIA: ICD-10-CM

## 2024-02-14 DIAGNOSIS — R07.9 CHEST PAIN, UNSPECIFIED TYPE: ICD-10-CM

## 2024-02-14 PROBLEM — Z98.890 S/P COLONOSCOPY WITH POLYPECTOMY: Status: RESOLVED | Noted: 2020-10-01 | Resolved: 2024-02-14

## 2024-02-14 PROBLEM — M65.311 TRIGGER THUMB OF RIGHT HAND: Status: RESOLVED | Noted: 2019-07-17 | Resolved: 2024-02-14

## 2024-02-14 PROBLEM — S76.319A HAMSTRING MUSCLE STRAIN: Status: RESOLVED | Noted: 2019-03-06 | Resolved: 2024-02-14

## 2024-02-14 PROBLEM — Z98.890 S/P COLONOSCOPIC POLYPECTOMY: Status: RESOLVED | Noted: 2017-05-31 | Resolved: 2024-02-14

## 2024-02-14 LAB
EST. AVERAGE GLUCOSE BLD GHB EST-MCNC: 128 MG/DL
HBA1C MFR BLD: 6.1 % (ref 4–5.6)

## 2024-02-14 PROCEDURE — 1123F ACP DISCUSS/DSCN MKR DOCD: CPT | Performed by: INTERNAL MEDICINE

## 2024-02-14 PROCEDURE — G0439 PPPS, SUBSEQ VISIT: HCPCS | Performed by: INTERNAL MEDICINE

## 2024-02-14 PROCEDURE — 1036F TOBACCO NON-USER: CPT | Performed by: INTERNAL MEDICINE

## 2024-02-14 PROCEDURE — 3080F DIAST BP >= 90 MM HG: CPT | Performed by: INTERNAL MEDICINE

## 2024-02-14 PROCEDURE — 3077F SYST BP >= 140 MM HG: CPT | Performed by: INTERNAL MEDICINE

## 2024-02-14 PROCEDURE — G8428 CUR MEDS NOT DOCUMENT: HCPCS | Performed by: INTERNAL MEDICINE

## 2024-02-14 PROCEDURE — 3046F HEMOGLOBIN A1C LEVEL >9.0%: CPT | Performed by: INTERNAL MEDICINE

## 2024-02-14 PROCEDURE — 2022F DILAT RTA XM EVC RTNOPTHY: CPT | Performed by: INTERNAL MEDICINE

## 2024-02-14 PROCEDURE — G8484 FLU IMMUNIZE NO ADMIN: HCPCS | Performed by: INTERNAL MEDICINE

## 2024-02-14 PROCEDURE — G8417 CALC BMI ABV UP PARAM F/U: HCPCS | Performed by: INTERNAL MEDICINE

## 2024-02-14 PROCEDURE — 99214 OFFICE O/P EST MOD 30 MIN: CPT | Performed by: INTERNAL MEDICINE

## 2024-02-14 PROCEDURE — 3017F COLORECTAL CA SCREEN DOC REV: CPT | Performed by: INTERNAL MEDICINE

## 2024-02-14 RX ORDER — AMLODIPINE BESYLATE 10 MG/1
10 TABLET ORAL DAILY
Qty: 90 TABLET | Refills: 3 | Status: SHIPPED | OUTPATIENT
Start: 2024-02-14

## 2024-02-14 RX ORDER — GINSENG 100 MG
CAPSULE ORAL 2 TIMES DAILY
COMMUNITY

## 2024-02-14 NOTE — PATIENT INSTRUCTIONS
glasses or contact lenses need to be.  Visual field tests   Your doctor may have you look through special machines.  Or your doctor may simply have you stare straight ahead while they move a finger into and out of your field of vision.  Color vision test   You look at pieces of printed test patterns in various colors. You say what number or symbol you see.  Your doctor may have you trace the number or symbol using a pointer.  How do these tests feel?  There is very little chance of having a problem from this test. If dilating drops are used for a vision test, they may make the eyes sting and cause a medicine taste in the mouth.  Follow-up care is a key part of your treatment and safety. Be sure to make and go to all appointments, and call your doctor if you are having problems. It's also a good idea to know your test results and keep a list of the medicines you take.  Where can you learn more?  Go to https://www.[employer].net/patientEd and enter G551 to learn more about \"Learning About Vision Tests.\"  Current as of: June 6, 2023               Content Version: 13.9  © 1360-9940 [employer].   Care instructions adapted under license by [employer]. If you have questions about a medical condition or this instruction, always ask your healthcare professional. [employer] disclaims any warranty or liability for your use of this information.           Advance Directives: Care Instructions  Overview  An advance directive is a legal way to state your wishes at the end of your life. It tells your family and your doctor what to do if you can't say what you want.  There are two main types of advance directives. You can change them any time your wishes change.  Living will.  This form tells your family and your doctor your wishes about life support and other treatment. The form is also called a declaration.  Medical power of .  This form lets you name a person to make treatment decisions for

## 2024-02-14 NOTE — PROGRESS NOTES
Chief Complaint   Patient presents with    Medicare AWV     Pt states he has a cyst on right side of buttocks, has been there around 3 weeks, not painful.  Pt states he had chest pain an week and half ago, lasted a couple of hours and it went away, pt thinks it may have been indigestion.  Pt states when he is standing for a long time, leg feels like it wants to give away, sits down for a few minutes and is able to get up and continue activity.    1. Have you been to the ER, urgent care clinic since your last visit?  Hospitalized since your last visit?No    2. Have you seen or consulted any other health care providers outside of the [facility] since your last visit?  Include any pap smears or colon screening. Yes When: [facility] Dr. Nancy Lawson     BP (!) 145/90 (Site: Left Upper Arm, Position: Sitting)   Pulse 56   Temp 97.6 °F (36.4 °C) (Oral)   Resp 16   Ht 1.854 m (6' 1\")   Wt 117.3 kg (258 lb 9.6 oz)   SpO2 98%   BMI 34.12 kg/m²     
SPORTS MEDICINE AND PRIMARY CARE  Guido Chavez MD, FACP, CMD  [address] [facility] [address]  Phone:  [phone]  Fax: [phone]       Chief Complaint   Patient presents with    Medicare AW   .      SUBJECTIVE:    Gianfranco Nation Sr. is a 68 y.o. male  Dictation on: 02/14/2024 10:01 AM by: GUIDO CHAVEZ [[id number]]          Current Outpatient Medications   Medication Sig Dispense Refill    Multiple Vitamins-Minerals (CENTRUM SILVER 50+MEN) TABS Take by mouth      bacitracin 500 UNIT/GM ointment Apply topically 2 times daily Apply topically 2 times daily.      amLODIPine (NORVASC) 10 MG tablet Take 1 tablet by mouth daily 90 tablet 3    tamsulosin (FLOMAX) 0.4 MG capsule TAKE 2 CAPSULES BY MOUTH DAILY 120 capsule 5    polyethylene glycol (GLYCOLAX) 17 GM/SCOOP powder Take 17 g by mouth daily as needed (constipation) 510 g 0    famotidine (PEPCID) 20 MG tablet Take 1 tablet by mouth daily 30 tablet 0    atenolol (TENORMIN) 50 MG tablet Take 1 tablet by mouth nightly 90 tablet 3    finasteride (PROSCAR) 5 MG tablet Take 1 tablet by mouth daily 90 tablet 3    ascorbic acid (VITAMIN C) 1000 MG tablet ceived the following from [facility]: Outside name: ascorbic acid, vitamin C, (VITAMIN C) 1,000 mg tablet      calcipotriene (DOVONEX) 0.005 % cream ceived the following from [facility]: Outside name: calcipotriene (DOVONEX) 0.005 % topical cream      Calcium Polycarbophil (FIBER) 625 MG TABS ceived the following from [facility]: Outside name: calcium polycarbophiL (Fiber) 625 mg tablet      colchicine (COLCRYS) 0.6 MG tablet Take 2 tablets at the onset of your gout and may repeat in 4 hours with 1 tablet then 1 tablet daily as needed       No current facility-administered medications for this visit.     Past Medical History:   Diagnosis Date    BPH (benign prostatic hyperplasia)     Chest pain 09/17/2022    er    Depression     DM2 (diabetes mellitus, 
mass index is 34.12 kg/m².               Allergies   Allergen Reactions    Metformin And Related Other (See Comments)     Not feeling good, constipation    Shellfish Allergy Rash     Prior to Visit Medications    Medication Sig Taking? Authorizing Provider   Multiple Vitamins-Minerals (CENTRUM SILVER 50+MEN) TABS Take by mouth Yes Navdeep oRa MD   bacitracin 500 UNIT/GM ointment Apply topically 2 times daily Apply topically 2 times daily. Yes Navdeep Roa MD   tamsulosin (FLOMAX) 0.4 MG capsule TAKE 2 CAPSULES BY MOUTH DAILY Yes Ethan Chavez MD   polyethylene glycol (GLYCOLAX) 17 GM/SCOOP powder Take 17 g by mouth daily as needed (constipation) Yes Carissa Ackerman APRN - NP   famotidine (PEPCID) 20 MG tablet Take 1 tablet by mouth daily Yes Carissa Ackerman APRN - NP   atenolol (TENORMIN) 50 MG tablet Take 1 tablet by mouth nightly Yes Ethan Chavez MD   amLODIPine (NORVASC) 5 MG tablet Take 1 tablet by mouth daily Yes Kurtis Duke MD   finasteride (PROSCAR) 5 MG tablet Take 1 tablet by mouth daily Yes Ethan Chavez MD   ascorbic acid (VITAMIN C) 1000 MG tablet ceived the following from [facility]: Outside name: ascorbic acid, vitamin C, (VITAMIN C) 1,000 mg tablet Yes Automatic Reconciliation, Ar   calcipotriene (DOVONEX) 0.005 % cream ceived the following from [facility]: Outside name: calcipotriene (DOVONEX) 0.005 % topical cream Yes Automatic Reconciliation, Ar   Calcium Polycarbophil (FIBER) 625 MG TABS ceived the following from [facility]: Outside name: calcium polycarbophiL (Fiber) 625 mg tablet Yes Automatic Reconciliation, Ar   colchicine (COLCRYS) 0.6 MG tablet Take 2 tablets at the onset of your gout and may repeat in 4 hours with 1 tablet then 1 tablet daily as needed Yes Automatic Reconciliation, Ar   Inulin (FIBER CHOICE PO) Take by mouth  Patient not taking: Reported on 2/14/2024

## 2024-03-19 ENCOUNTER — HOSPITAL ENCOUNTER (OUTPATIENT)
Facility: HOSPITAL | Age: 69
Discharge: HOME | End: 2024-03-22
Attending: INTERNAL MEDICINE
Payer: MEDICARE

## 2024-03-19 ENCOUNTER — HOSPITAL ENCOUNTER (OUTPATIENT)
Facility: HOSPITAL | Age: 69
Discharge: HOME | End: 2024-03-21
Attending: INTERNAL MEDICINE
Payer: MEDICARE

## 2024-03-19 VITALS — HEIGHT: 73 IN | WEIGHT: 256 LBS | BODY MASS INDEX: 33.93 KG/M2

## 2024-03-19 DIAGNOSIS — R07.9 CHEST PAIN, UNSPECIFIED TYPE: ICD-10-CM

## 2024-03-19 PROCEDURE — 78452 HT MUSCLE IMAGE SPECT MULT: CPT

## 2024-03-19 PROCEDURE — A9500 TC99M SESTAMIBI: HCPCS | Performed by: INTERNAL MEDICINE

## 2024-03-19 PROCEDURE — 93017 CV STRESS TEST TRACING ONLY: CPT

## 2024-03-19 PROCEDURE — 3430000000 HC RX DIAGNOSTIC RADIOPHARMACEUTICAL: Performed by: INTERNAL MEDICINE

## 2024-03-19 RX ORDER — TETRAKIS(2-METHOXYISOBUTYLISOCYANIDE)COPPER(I) TETRAFLUOROBORATE 1 MG/ML
31.5 INJECTION, POWDER, LYOPHILIZED, FOR SOLUTION INTRAVENOUS
Status: COMPLETED | OUTPATIENT
Start: 2024-03-19 | End: 2024-03-19

## 2024-03-19 RX ORDER — TETRAKIS(2-METHOXYISOBUTYLISOCYANIDE)COPPER(I) TETRAFLUOROBORATE 1 MG/ML
11 INJECTION, POWDER, LYOPHILIZED, FOR SOLUTION INTRAVENOUS
Status: COMPLETED | OUTPATIENT
Start: 2024-03-19 | End: 2024-03-19

## 2024-03-19 RX ADMIN — TETRAKIS(2-METHOXYISOBUTYLISOCYANIDE)COPPER(I) TETRAFLUOROBORATE 11 MILLICURIE: 1 INJECTION, POWDER, LYOPHILIZED, FOR SOLUTION INTRAVENOUS at 07:50

## 2024-03-19 RX ADMIN — TETRAKIS(2-METHOXYISOBUTYLISOCYANIDE)COPPER(I) TETRAFLUOROBORATE 31.5 MILLICURIE: 1 INJECTION, POWDER, LYOPHILIZED, FOR SOLUTION INTRAVENOUS at 09:30

## 2024-03-20 PROBLEM — R94.39 ABNORMAL CARDIOVASCULAR STRESS TEST: Status: ACTIVE | Noted: 2024-03-20

## 2024-03-20 LAB
ECHO BSA: 2.45 M2
EKG DIAGNOSIS: NORMAL
STRESS BASELINE DIAS BP: 97 MMHG
STRESS BASELINE HR: 59 BPM
STRESS BASELINE SYS BP: 148 MMHG
STRESS ESTIMATED WORKLOAD: 7 METS
STRESS EXERCISE DUR MIN: 5 MIN
STRESS EXERCISE DUR SEC: 9 SEC
STRESS O2 SAT PEAK: 97 %
STRESS O2 SAT REST: 94 %
STRESS PEAK DIAS BP: 92 MMHG
STRESS PEAK SYS BP: 197 MMHG
STRESS PERCENT HR ACHIEVED: 97 %
STRESS POST PEAK HR: 146 BPM
STRESS RATE PRESSURE PRODUCT: NORMAL BPM*MMHG
STRESS STAGE 1 BP: NORMAL MMHG
STRESS STAGE 1 DURATION: 3 MIN:SEC
STRESS STAGE 1 HR: 125 BPM
STRESS STAGE 2 BP: NORMAL MMHG
STRESS STAGE 2 DURATION: NORMAL MIN:SEC
STRESS STAGE 2 HR: 131 BPM
STRESS TARGET HR: 150 BPM

## 2024-04-23 LAB — DIABETIC RETINOPATHY: NEGATIVE

## 2024-06-19 ENCOUNTER — OFFICE VISIT (OUTPATIENT)
Facility: CLINIC | Age: 69
End: 2024-06-19
Payer: MEDICARE

## 2024-06-19 VITALS
BODY MASS INDEX: 36.11 KG/M2 | SYSTOLIC BLOOD PRESSURE: 130 MMHG | RESPIRATION RATE: 16 BRPM | OXYGEN SATURATION: 96 % | HEIGHT: 73 IN | HEART RATE: 61 BPM | DIASTOLIC BLOOD PRESSURE: 77 MMHG | WEIGHT: 272.5 LBS | TEMPERATURE: 97.7 F

## 2024-06-19 DIAGNOSIS — R35.1 NOCTURIA: ICD-10-CM

## 2024-06-19 DIAGNOSIS — E66.01 SEVERE OBESITY WITH BODY MASS INDEX (BMI) OF 35.0 TO 39.9 WITH SERIOUS COMORBIDITY (HCC): ICD-10-CM

## 2024-06-19 DIAGNOSIS — E11.9 TYPE 2 DIABETES MELLITUS WITHOUT COMPLICATION, WITHOUT LONG-TERM CURRENT USE OF INSULIN (HCC): Primary | ICD-10-CM

## 2024-06-19 DIAGNOSIS — G44.52 NEW DAILY PERSISTENT HEADACHE: ICD-10-CM

## 2024-06-19 DIAGNOSIS — R91.1 PULMONARY NODULE, RIGHT: ICD-10-CM

## 2024-06-19 DIAGNOSIS — R60.0 LOCALIZED EDEMA: ICD-10-CM

## 2024-06-19 DIAGNOSIS — H93.13 TINNITUS AURIUM, BILATERAL: ICD-10-CM

## 2024-06-19 DIAGNOSIS — R06.02 SOB (SHORTNESS OF BREATH): ICD-10-CM

## 2024-06-19 DIAGNOSIS — G47.33 OBSTRUCTIVE SLEEP APNEA: ICD-10-CM

## 2024-06-19 DIAGNOSIS — E78.5 DYSLIPIDEMIA: ICD-10-CM

## 2024-06-19 DIAGNOSIS — I10 PRIMARY HYPERTENSION: ICD-10-CM

## 2024-06-19 PROCEDURE — 2022F DILAT RTA XM EVC RTNOPTHY: CPT | Performed by: INTERNAL MEDICINE

## 2024-06-19 PROCEDURE — 36415 COLL VENOUS BLD VENIPUNCTURE: CPT | Performed by: INTERNAL MEDICINE

## 2024-06-19 PROCEDURE — G8427 DOCREV CUR MEDS BY ELIG CLIN: HCPCS | Performed by: INTERNAL MEDICINE

## 2024-06-19 PROCEDURE — 3075F SYST BP GE 130 - 139MM HG: CPT | Performed by: INTERNAL MEDICINE

## 2024-06-19 PROCEDURE — 1123F ACP DISCUSS/DSCN MKR DOCD: CPT | Performed by: INTERNAL MEDICINE

## 2024-06-19 PROCEDURE — 3017F COLORECTAL CA SCREEN DOC REV: CPT | Performed by: INTERNAL MEDICINE

## 2024-06-19 PROCEDURE — 3044F HG A1C LEVEL LT 7.0%: CPT | Performed by: INTERNAL MEDICINE

## 2024-06-19 PROCEDURE — 3078F DIAST BP <80 MM HG: CPT | Performed by: INTERNAL MEDICINE

## 2024-06-19 PROCEDURE — 99214 OFFICE O/P EST MOD 30 MIN: CPT | Performed by: INTERNAL MEDICINE

## 2024-06-19 PROCEDURE — G8417 CALC BMI ABV UP PARAM F/U: HCPCS | Performed by: INTERNAL MEDICINE

## 2024-06-19 PROCEDURE — 1036F TOBACCO NON-USER: CPT | Performed by: INTERNAL MEDICINE

## 2024-06-19 SDOH — ECONOMIC STABILITY: FOOD INSECURITY: WITHIN THE PAST 12 MONTHS, THE FOOD YOU BOUGHT JUST DIDN'T LAST AND YOU DIDN'T HAVE MONEY TO GET MORE.: NEVER TRUE

## 2024-06-19 SDOH — ECONOMIC STABILITY: FOOD INSECURITY: WITHIN THE PAST 12 MONTHS, YOU WORRIED THAT YOUR FOOD WOULD RUN OUT BEFORE YOU GOT MONEY TO BUY MORE.: NEVER TRUE

## 2024-06-19 SDOH — ECONOMIC STABILITY: INCOME INSECURITY: HOW HARD IS IT FOR YOU TO PAY FOR THE VERY BASICS LIKE FOOD, HOUSING, MEDICAL CARE, AND HEATING?: NOT HARD AT ALL

## 2024-06-19 ASSESSMENT — PATIENT HEALTH QUESTIONNAIRE - PHQ9
SUM OF ALL RESPONSES TO PHQ QUESTIONS 1-9: 0
2. FEELING DOWN, DEPRESSED OR HOPELESS: NOT AT ALL
SUM OF ALL RESPONSES TO PHQ9 QUESTIONS 1 & 2: 0
SUM OF ALL RESPONSES TO PHQ QUESTIONS 1-9: 0
1. LITTLE INTEREST OR PLEASURE IN DOING THINGS: NOT AT ALL
SUM OF ALL RESPONSES TO PHQ QUESTIONS 1-9: 0
SUM OF ALL RESPONSES TO PHQ QUESTIONS 1-9: 0

## 2024-06-19 ASSESSMENT — ANXIETY QUESTIONNAIRES
GAD7 TOTAL SCORE: 0
7. FEELING AFRAID AS IF SOMETHING AWFUL MIGHT HAPPEN: NOT AT ALL
4. TROUBLE RELAXING: NOT AT ALL
2. NOT BEING ABLE TO STOP OR CONTROL WORRYING: NOT AT ALL
IF YOU CHECKED OFF ANY PROBLEMS ON THIS QUESTIONNAIRE, HOW DIFFICULT HAVE THESE PROBLEMS MADE IT FOR YOU TO DO YOUR WORK, TAKE CARE OF THINGS AT HOME, OR GET ALONG WITH OTHER PEOPLE: NOT DIFFICULT AT ALL
1. FEELING NERVOUS, ANXIOUS, OR ON EDGE: NOT AT ALL
5. BEING SO RESTLESS THAT IT IS HARD TO SIT STILL: NOT AT ALL
3. WORRYING TOO MUCH ABOUT DIFFERENT THINGS: NOT AT ALL
6. BECOMING EASILY ANNOYED OR IRRITABLE: NOT AT ALL

## 2024-06-19 NOTE — PROGRESS NOTES
Chief Complaint   Patient presents with    Diabetes    Headache    Tinnitus     Patient states \" he has been having some headaches and ringing in his ears.\"  \"Have you been to the ER, urgent care clinic since your last visit?  Hospitalized since your last visit?\"    NO    “Have you seen or consulted any other health care providers outside of [facility] since your last visit?”    NO        “Have you had a colorectal cancer screening such as a colonoscopy/FIT/Cologuard?    NO    Date of last Colonoscopy: 9/30/2020  No cologuard on file  No FIT/FOBT on file   No flexible sigmoidoscopy on file         Click Here for Release of Records Request

## 2024-06-19 NOTE — PROGRESS NOTES
SPORTS MEDICINE AND PRIMARY CARE  Ethan Chavez MD, FACP, CMD  [address] [facility] [address]  Phone:  [phone]  Fax: [phone]       Chief Complaint   Patient presents with    Diabetes    Headache    Tinnitus   .      SUBJECTIVE:    Gianfranco Nation Sr. is a 68 y.o. male Patient returns today with a known history of severe obesity, type 2 diabetes, sleep apnea, right pulmonary nodule, prostatism, lumbar radiculopathy, hypertension, dyslipidemia, and is seen for evaluation.  Patient returns today saying he was on a cruise and it \"spoiled\" him.  This accounts for his weight change.  Patient is seen for evaluation.             Current Outpatient Medications   Medication Sig Dispense Refill    Multiple Vitamins-Minerals (CENTRUM SILVER 50+MEN) TABS Take by mouth      bacitracin 500 UNIT/GM ointment Apply topically 2 times daily Apply topically 2 times daily.      amLODIPine (NORVASC) 10 MG tablet Take 1 tablet by mouth daily 90 tablet 3    tamsulosin (FLOMAX) 0.4 MG capsule TAKE 2 CAPSULES BY MOUTH DAILY 120 capsule 5    atenolol (TENORMIN) 50 MG tablet Take 1 tablet by mouth nightly 90 tablet 3    finasteride (PROSCAR) 5 MG tablet Take 1 tablet by mouth daily 90 tablet 3    ascorbic acid (VITAMIN C) 1000 MG tablet ceived the following from [facility]: Outside name: ascorbic acid, vitamin C, (VITAMIN C) 1,000 mg tablet      calcipotriene (DOVONEX) 0.005 % cream ceived the following from [facility]: Outside name: calcipotriene (DOVONEX) 0.005 % topical cream      Calcium Polycarbophil (FIBER) 625 MG TABS ceived the following from [facility]: Outside name: calcium polycarbophiL (Fiber) 625 mg tablet      colchicine (COLCRYS) 0.6 MG tablet Take 2 tablets at the onset of your gout and may repeat in 4 hours with 1 tablet then 1 tablet daily as needed       No current facility-administered medications for this visit.     Past Medical History:   Diagnosis

## 2024-06-20 LAB
ANION GAP SERPL CALC-SCNC: 3 MMOL/L (ref 5–15)
BUN SERPL-MCNC: 7 MG/DL (ref 6–20)
BUN/CREAT SERPL: 6 (ref 12–20)
CALCIUM SERPL-MCNC: 9.1 MG/DL (ref 8.5–10.1)
CHLORIDE SERPL-SCNC: 108 MMOL/L (ref 97–108)
CHOLEST SERPL-MCNC: 119 MG/DL
CO2 SERPL-SCNC: 28 MMOL/L (ref 21–32)
CREAT SERPL-MCNC: 1.25 MG/DL (ref 0.7–1.3)
EST. AVERAGE GLUCOSE BLD GHB EST-MCNC: 137 MG/DL
GLUCOSE SERPL-MCNC: 130 MG/DL (ref 65–100)
HBA1C MFR BLD: 6.4 % (ref 4–5.6)
HDLC SERPL-MCNC: 42 MG/DL
HDLC SERPL: 2.8 (ref 0–5)
LDLC SERPL CALC-MCNC: 27.2 MG/DL (ref 0–100)
NT PRO BNP: 26 PG/ML
POTASSIUM SERPL-SCNC: 4.2 MMOL/L (ref 3.5–5.1)
PSA SERPL-MCNC: 0.2 NG/ML (ref 0.01–4)
SODIUM SERPL-SCNC: 139 MMOL/L (ref 136–145)
TRIGL SERPL-MCNC: 249 MG/DL
VLDLC SERPL CALC-MCNC: 49.8 MG/DL

## 2024-06-21 ENCOUNTER — APPOINTMENT (OUTPATIENT)
Facility: HOSPITAL | Age: 69
End: 2024-06-21
Payer: MEDICARE

## 2024-06-21 ENCOUNTER — HOSPITAL ENCOUNTER (EMERGENCY)
Facility: HOSPITAL | Age: 69
Discharge: HOME | End: 2024-06-21
Attending: EMERGENCY MEDICINE
Payer: MEDICARE

## 2024-06-21 VITALS
SYSTOLIC BLOOD PRESSURE: 134 MMHG | WEIGHT: 272.27 LBS | OXYGEN SATURATION: 96 % | RESPIRATION RATE: 18 BRPM | DIASTOLIC BLOOD PRESSURE: 86 MMHG | TEMPERATURE: 97.7 F | HEIGHT: 73 IN | HEART RATE: 56 BPM | BODY MASS INDEX: 36.08 KG/M2

## 2024-06-21 DIAGNOSIS — R60.0 BILATERAL LOWER EXTREMITY EDEMA: Primary | ICD-10-CM

## 2024-06-21 LAB
ALBUMIN SERPL-MCNC: 3.7 G/DL (ref 3.5–5)
ALBUMIN/GLOB SERPL: 0.9 (ref 1.1–2.2)
ALP SERPL-CCNC: 79 U/L (ref 45–117)
ALT SERPL-CCNC: 36 U/L (ref 12–78)
ANION GAP SERPL CALC-SCNC: 7 MMOL/L (ref 5–15)
AST SERPL-CCNC: 29 U/L (ref 15–37)
BASOPHILS # BLD: 0.1 K/UL (ref 0–0.1)
BASOPHILS NFR BLD: 1 % (ref 0–1)
BILIRUB SERPL-MCNC: 0.9 MG/DL (ref 0.2–1)
BUN SERPL-MCNC: 10 MG/DL (ref 6–20)
BUN/CREAT SERPL: 7 (ref 12–20)
CALCIUM SERPL-MCNC: 9.1 MG/DL (ref 8.5–10.1)
CHLORIDE SERPL-SCNC: 109 MMOL/L (ref 97–108)
CO2 SERPL-SCNC: 24 MMOL/L (ref 21–32)
CREAT SERPL-MCNC: 1.43 MG/DL (ref 0.7–1.3)
DIFFERENTIAL METHOD BLD: NORMAL
ECHO BSA: 2.52 M2
EOSINOPHIL # BLD: 0.3 K/UL (ref 0–0.4)
EOSINOPHIL NFR BLD: 4 % (ref 0–7)
ERYTHROCYTE [DISTWIDTH] IN BLOOD BY AUTOMATED COUNT: 14.5 % (ref 11.5–14.5)
GLOBULIN SER CALC-MCNC: 3.9 G/DL (ref 2–4)
GLUCOSE SERPL-MCNC: 145 MG/DL (ref 65–100)
HCT VFR BLD AUTO: 42.4 % (ref 36.6–50.3)
HGB BLD-MCNC: 14.1 G/DL (ref 12.1–17)
IMM GRANULOCYTES # BLD AUTO: 0 K/UL (ref 0–0.04)
IMM GRANULOCYTES NFR BLD AUTO: 0 % (ref 0–0.5)
LYMPHOCYTES # BLD: 2.3 K/UL (ref 0.8–3.5)
LYMPHOCYTES NFR BLD: 35 % (ref 12–49)
MCH RBC QN AUTO: 29 PG (ref 26–34)
MCHC RBC AUTO-ENTMCNC: 33.3 G/DL (ref 30–36.5)
MCV RBC AUTO: 87.1 FL (ref 80–99)
MONOCYTES # BLD: 0.7 K/UL (ref 0–1)
MONOCYTES NFR BLD: 11 % (ref 5–13)
NEUTS SEG # BLD: 3.2 K/UL (ref 1.8–8)
NEUTS SEG NFR BLD: 49 % (ref 32–75)
NRBC # BLD: 0 K/UL (ref 0–0.01)
NRBC BLD-RTO: 0 PER 100 WBC
NT PRO BNP: 38 PG/ML
PLATELET # BLD AUTO: 176 K/UL (ref 150–400)
PMV BLD AUTO: 9.7 FL (ref 8.9–12.9)
POTASSIUM SERPL-SCNC: 3.7 MMOL/L (ref 3.5–5.1)
PROT SERPL-MCNC: 7.6 G/DL (ref 6.4–8.2)
RBC # BLD AUTO: 4.87 M/UL (ref 4.1–5.7)
SODIUM SERPL-SCNC: 140 MMOL/L (ref 136–145)
TROPONIN I SERPL HS-MCNC: 9 NG/L (ref 0–76)
WBC # BLD AUTO: 6.5 K/UL (ref 4.1–11.1)

## 2024-06-21 PROCEDURE — 83880 ASSAY OF NATRIURETIC PEPTIDE: CPT

## 2024-06-21 PROCEDURE — 71045 X-RAY EXAM CHEST 1 VIEW: CPT

## 2024-06-21 PROCEDURE — 93970 EXTREMITY STUDY: CPT

## 2024-06-21 PROCEDURE — 36415 COLL VENOUS BLD VENIPUNCTURE: CPT

## 2024-06-21 PROCEDURE — 84484 ASSAY OF TROPONIN QUANT: CPT

## 2024-06-21 PROCEDURE — 85025 COMPLETE CBC W/AUTO DIFF WBC: CPT

## 2024-06-21 PROCEDURE — 99285 EMERGENCY DEPT VISIT HI MDM: CPT

## 2024-06-21 PROCEDURE — 80053 COMPREHEN METABOLIC PANEL: CPT

## 2024-06-21 RX ORDER — FUROSEMIDE 20 MG/1
20 TABLET ORAL EVERY MORNING
Qty: 7 TABLET | Refills: 0 | Status: SHIPPED | OUTPATIENT
Start: 2024-06-21

## 2024-06-21 ASSESSMENT — PAIN - FUNCTIONAL ASSESSMENT: PAIN_FUNCTIONAL_ASSESSMENT: NONE - DENIES PAIN

## 2024-06-21 NOTE — ED NOTES
Bedside shift change report given to BA Huang (oncoming nurse) by BA Fontenot (offgoing nurse). Report included the following information Nurse Handoff Report and Recent Results.

## 2024-06-21 NOTE — DISCHARGE INSTRUCTIONS
You were seen in the emergency department for  your symptoms.  Your ultrasound here did not show any evidence of blood clot.  Please call your primary care doctor as you can cancel your scheduled ultrasound.    Please take the fluid pills.  Please try compression stockings and keep legs elevated.  Follow-up with cardiologist.

## 2024-06-21 NOTE — ED NOTES
Assumed care of patient ambulatory from triage w c/o bilateral lower extremity swelling acute onset. Patient denies any other issues at this time. Placed on the monitor x 4.

## 2024-06-21 NOTE — ED PROVIDER NOTES
[facility] EMERGENCY DEPT  EMERGENCY DEPARTMENT ENCOUNTER       Pt Name: Gianfranco Nation Sr.  MRN: [id number]  Birthdate 1955  Date of evaluation: 6/21/2024  Provider: Cy Hollingsworth MD   PCP: Ethan Chavez MD  Note Started: 2:04 AM EDT 6/21/24     CHIEF COMPLAINT       Chief Complaint   Patient presents with    Leg Swelling     Patient arrives with complaint of bilateral leg swelling increasing over the past month. Patient reports no pain and right leg larger than left. Patient denies history of CHF and has not noted any oozing from his legs.  Patient denies any cardiac history and no shortness of breath.         HISTORY OF PRESENT ILLNESS: 1 or more elements      History From: patient, spouse, History limited by: none     Gianfranco Nation Sr. is a 69 y.o. male with a history of hypertension, diabetes and prostamegaly presents to the emergency department with a chief complaint of bilateral leg swelling.    Patient reports symptoms began 1 month ago.  Reports bilateral leg swelling, right greater than left.  Patient denies any history of this.  Patient reports he lives in a [facility] community and one of his friends who was a physician advised him to have these evaluated.  Patient denies any pain.  Denies any history of CHF or kidney disease.       Please See [facility] for Additional Details of the HPI/PMH  Nursing Notes were all reviewed and agreed with or any disagreements were addressed in the HPI.     REVIEW OF SYSTEMS        Positives and Pertinent negatives as per HPI.    PAST HISTORY     Past Medical History:  Past Medical History:   Diagnosis Date    Abnormal cardiovascular stress test 03/20/2024    referred to Dr. Cruz IMPRESSION: Small inferior reversibility as discussed. No infarct demonstrated. LVEF 47%    BPH (benign prostatic hyperplasia)     Chest pain 09/17/2022    er    Depression     DM2 (diabetes mellitus, type 2) (HCC)     Emphysema of lung (HCC)     Gout     Hamstring muscle strain 03/06/2019

## 2024-06-23 LAB
EKG ATRIAL RATE: 76 BPM
EKG DIAGNOSIS: NORMAL
EKG P AXIS: 58 DEGREES
EKG P-R INTERVAL: 188 MS
EKG Q-T INTERVAL: 468 MS
EKG QRS DURATION: 178 MS
EKG QTC CALCULATION (BAZETT): 526 MS
EKG R AXIS: -51 DEGREES
EKG T AXIS: -3 DEGREES
EKG VENTRICULAR RATE: 76 BPM

## 2024-07-17 ENCOUNTER — HOSPITAL ENCOUNTER (OUTPATIENT)
Facility: HOSPITAL | Age: 69
Discharge: HOME | End: 2024-07-20
Attending: INTERNAL MEDICINE
Payer: MEDICARE

## 2024-07-17 ENCOUNTER — APPOINTMENT (OUTPATIENT)
Facility: HOSPITAL | Age: 69
End: 2024-07-17
Attending: INTERNAL MEDICINE
Payer: MEDICARE

## 2024-07-17 DIAGNOSIS — G44.52 NEW DAILY PERSISTENT HEADACHE: ICD-10-CM

## 2024-07-17 DIAGNOSIS — E11.9 TYPE 2 DIABETES MELLITUS WITHOUT COMPLICATION, WITHOUT LONG-TERM CURRENT USE OF INSULIN (HCC): ICD-10-CM

## 2024-07-17 DIAGNOSIS — R91.1 PULMONARY NODULE, RIGHT: ICD-10-CM

## 2024-07-17 PROCEDURE — 70553 MRI BRAIN STEM W/O & W/DYE: CPT

## 2024-07-17 PROCEDURE — 6360000004 HC RX CONTRAST MEDICATION: Performed by: INTERNAL MEDICINE

## 2024-07-17 PROCEDURE — 71250 CT THORAX DX C-: CPT

## 2024-07-17 PROCEDURE — A9579 GAD-BASE MR CONTRAST NOS,1ML: HCPCS | Performed by: INTERNAL MEDICINE

## 2024-07-17 RX ADMIN — GADOTERIDOL 20 ML: 279.3 INJECTION, SOLUTION INTRAVENOUS at 15:25

## 2024-07-18 PROBLEM — M48.02 CERVICAL STENOSIS OF SPINE: Status: ACTIVE | Noted: 2024-07-18

## 2024-08-08 RX ORDER — FINASTERIDE 5 MG/1
5 TABLET, FILM COATED ORAL DAILY
Qty: 90 TABLET | Refills: 3 | Status: SHIPPED | OUTPATIENT
Start: 2024-08-08

## 2024-08-08 RX ORDER — AMLODIPINE BESYLATE 5 MG/1
5 TABLET ORAL DAILY
Qty: 30 TABLET | Refills: 11 | OUTPATIENT
Start: 2024-08-08

## 2024-08-08 RX ORDER — FINASTERIDE 5 MG/1
5 TABLET, FILM COATED ORAL DAILY
Qty: 7 TABLET | Refills: 0 | OUTPATIENT
Start: 2024-08-08

## 2024-08-29 RX ORDER — ATENOLOL 50 MG/1
50 TABLET ORAL NIGHTLY
Qty: 90 TABLET | Refills: 3 | Status: SHIPPED | OUTPATIENT
Start: 2024-08-29

## 2024-10-23 ENCOUNTER — OFFICE VISIT (OUTPATIENT)
Facility: CLINIC | Age: 69
End: 2024-10-23

## 2024-10-23 VITALS
HEIGHT: 73 IN | DIASTOLIC BLOOD PRESSURE: 72 MMHG | HEART RATE: 62 BPM | RESPIRATION RATE: 20 BRPM | SYSTOLIC BLOOD PRESSURE: 117 MMHG | BODY MASS INDEX: 35.25 KG/M2 | TEMPERATURE: 97.7 F | WEIGHT: 266 LBS | OXYGEN SATURATION: 97 %

## 2024-10-23 DIAGNOSIS — Z23 NEED FOR VACCINATION: Primary | ICD-10-CM

## 2024-10-23 DIAGNOSIS — G47.33 OBSTRUCTIVE SLEEP APNEA: ICD-10-CM

## 2024-10-23 DIAGNOSIS — E11.9 TYPE 2 DIABETES MELLITUS WITHOUT COMPLICATION, WITHOUT LONG-TERM CURRENT USE OF INSULIN (HCC): ICD-10-CM

## 2024-10-23 DIAGNOSIS — I10 PRIMARY HYPERTENSION: ICD-10-CM

## 2024-10-23 DIAGNOSIS — R21 RASH: ICD-10-CM

## 2024-10-23 DIAGNOSIS — M25.469 SWELLING OF JOINT OF LOWER LEG: ICD-10-CM

## 2024-10-23 NOTE — PROGRESS NOTES
Gianfranco Nation . is a 69 y.o. male presenting for Diabetes, Follow-up, and Skin Problem (Discoloration all over for past 2 months )      /72   Pulse 62   Temp 97.7 °F (36.5 °C) (Oral)   Resp 20   Ht 1.854 m (6' 1\")   Wt 120.7 kg (266 lb)   SpO2 97%   BMI 35.09 kg/m²       Current Outpatient Medications   Medication Sig Dispense Refill    atenolol (TENORMIN) 50 MG tablet TAKE 1 TABLET BY MOUTH EVERY NIGHT 90 tablet 3    finasteride (PROSCAR) 5 MG tablet TAKE 1 TABLET BY MOUTH DAILY 90 tablet 3    furosemide (LASIX) 20 MG tablet Take 1 tablet by mouth every morning 7 tablet 0    Multiple Vitamins-Minerals (CENTRUM SILVER 50+MEN) TABS Take by mouth      bacitracin 500 UNIT/GM ointment Apply topically 2 times daily Apply topically 2 times daily.      amLODIPine (NORVASC) 10 MG tablet Take 1 tablet by mouth daily 90 tablet 3    tamsulosin (FLOMAX) 0.4 MG capsule TAKE 2 CAPSULES BY MOUTH DAILY 120 capsule 5    ascorbic acid (VITAMIN C) 1000 MG tablet ceived the following from [facility]: Outside name: ascorbic acid, vitamin C, (VITAMIN C) 1,000 mg tablet      calcipotriene (DOVONEX) 0.005 % cream ceived the following from [facility]: Outside name: calcipotriene (DOVONEX) 0.005 % topical cream      Calcium Polycarbophil (FIBER) 625 MG TABS ceived the following from [facility]: Outside name: calcium polycarbophiL (Fiber) 625 mg tablet      colchicine (COLCRYS) 0.6 MG tablet Take 2 tablets at the onset of your gout and may repeat in 4 hours with 1 tablet then 1 tablet daily as needed       No current facility-administered medications for this visit.        1. \"Have you been to the ER, urgent care clinic since your last visit?  Hospitalized since your last visit?\" No    2. \"Have you seen or consulted any other health care providers outside of the [facility] since your last visit?\" Yes [facility]      3. For patients aged

## 2024-10-23 NOTE — PROGRESS NOTES
SPORTS MEDICINE AND PRIMARY CARE  Guido Chavez MD, FACP, CMD  [address] [facility] [address]  Phone:  [phone]  Fax: [phone]       Chief Complaint   Patient presents with    Diabetes    Follow-up    Skin Problem     Discoloration all over for past 2 months    .      SUBJECTIVE:       Gianfranco Nation Sr. is a 69 y.o. male  Dictation on: 10/23/2024 10:41 AM by: GUIDO CHAVEZ [[id number]]          Current Outpatient Medications   Medication Sig Dispense Refill    atenolol (TENORMIN) 50 MG tablet TAKE 1 TABLET BY MOUTH EVERY NIGHT 90 tablet 3    finasteride (PROSCAR) 5 MG tablet TAKE 1 TABLET BY MOUTH DAILY 90 tablet 3    furosemide (LASIX) 20 MG tablet Take 1 tablet by mouth every morning 7 tablet 0    Multiple Vitamins-Minerals (CENTRUM SILVER 50+MEN) TABS Take by mouth      bacitracin 500 UNIT/GM ointment Apply topically 2 times daily Apply topically 2 times daily.      amLODIPine (NORVASC) 10 MG tablet Take 1 tablet by mouth daily 90 tablet 3    tamsulosin (FLOMAX) 0.4 MG capsule TAKE 2 CAPSULES BY MOUTH DAILY 120 capsule 5    ascorbic acid (VITAMIN C) 1000 MG tablet ceived the following from [facility]: Outside name: ascorbic acid, vitamin C, (VITAMIN C) 1,000 mg tablet      calcipotriene (DOVONEX) 0.005 % cream ceived the following from [facility]: Outside name: calcipotriene (DOVONEX) 0.005 % topical cream      Calcium Polycarbophil (FIBER) 625 MG TABS ceived the following from [facility]: Outside name: calcium polycarbophiL (Fiber) 625 mg tablet      colchicine (COLCRYS) 0.6 MG tablet Take 2 tablets at the onset of your gout and may repeat in 4 hours with 1 tablet then 1 tablet daily as needed       No current facility-administered medications for this visit.     Past Medical History:   Diagnosis Date    Abnormal cardiovascular stress test 03/20/2024    referred to Dr. Cruz IMPRESSION: Small inferior reversibility as discussed. No

## 2024-10-24 LAB
ANION GAP SERPL CALC-SCNC: 9 MMOL/L (ref 2–12)
BUN SERPL-MCNC: 10 MG/DL (ref 6–20)
BUN/CREAT SERPL: 8 (ref 12–20)
CALCIUM SERPL-MCNC: 8.8 MG/DL (ref 8.5–10.1)
CHLORIDE SERPL-SCNC: 104 MMOL/L (ref 97–108)
CO2 SERPL-SCNC: 25 MMOL/L (ref 21–32)
CREAT SERPL-MCNC: 1.26 MG/DL (ref 0.7–1.3)
CREAT UR-MCNC: 224 MG/DL
EST. AVERAGE GLUCOSE BLD GHB EST-MCNC: 146 MG/DL
GLUCOSE SERPL-MCNC: 136 MG/DL (ref 65–100)
HBA1C MFR BLD: 6.7 % (ref 4–5.6)
MICROALBUMIN UR-MCNC: 1.4 MG/DL
MICROALBUMIN/CREAT UR-RTO: 6 MG/G (ref 0–30)
POTASSIUM SERPL-SCNC: 3.9 MMOL/L (ref 3.5–5.1)
SODIUM SERPL-SCNC: 138 MMOL/L (ref 136–145)

## 2024-11-21 RX ORDER — FINASTERIDE 5 MG/1
5 TABLET, FILM COATED ORAL DAILY
Qty: 90 TABLET | Refills: 3 | Status: SHIPPED | OUTPATIENT
Start: 2024-11-21

## 2024-12-22 RX ORDER — TAMSULOSIN HYDROCHLORIDE 0.4 MG/1
0.8 CAPSULE ORAL DAILY
Qty: 120 CAPSULE | Refills: 5 | Status: SHIPPED | OUTPATIENT
Start: 2024-12-22

## 2025-01-20 RX ORDER — COLCHICINE 0.6 MG/1
0.6 TABLET ORAL DAILY
Qty: 30 TABLET | Refills: 11 | Status: SHIPPED | OUTPATIENT
Start: 2025-01-20

## 2025-02-25 SDOH — ECONOMIC STABILITY: FOOD INSECURITY: WITHIN THE PAST 12 MONTHS, YOU WORRIED THAT YOUR FOOD WOULD RUN OUT BEFORE YOU GOT MONEY TO BUY MORE.: NEVER TRUE

## 2025-02-25 SDOH — ECONOMIC STABILITY: TRANSPORTATION INSECURITY
IN THE PAST 12 MONTHS, HAS THE LACK OF TRANSPORTATION KEPT YOU FROM MEDICAL APPOINTMENTS OR FROM GETTING MEDICATIONS?: NO

## 2025-02-25 SDOH — ECONOMIC STABILITY: INCOME INSECURITY: IN THE LAST 12 MONTHS, WAS THERE A TIME WHEN YOU WERE NOT ABLE TO PAY THE MORTGAGE OR RENT ON TIME?: NO

## 2025-02-25 SDOH — ECONOMIC STABILITY: FOOD INSECURITY: WITHIN THE PAST 12 MONTHS, THE FOOD YOU BOUGHT JUST DIDN'T LAST AND YOU DIDN'T HAVE MONEY TO GET MORE.: NEVER TRUE

## 2025-02-26 ENCOUNTER — OFFICE VISIT (OUTPATIENT)
Facility: CLINIC | Age: 70
End: 2025-02-26

## 2025-02-26 VITALS
DIASTOLIC BLOOD PRESSURE: 86 MMHG | SYSTOLIC BLOOD PRESSURE: 128 MMHG | TEMPERATURE: 98.2 F | HEIGHT: 73 IN | OXYGEN SATURATION: 97 % | BODY MASS INDEX: 35.68 KG/M2 | WEIGHT: 269.2 LBS | RESPIRATION RATE: 18 BRPM | HEART RATE: 67 BPM

## 2025-02-26 DIAGNOSIS — E78.00 HYPERCHOLESTEROLEMIA: ICD-10-CM

## 2025-02-26 DIAGNOSIS — I10 PRIMARY HYPERTENSION: ICD-10-CM

## 2025-02-26 DIAGNOSIS — Z00.00 MEDICARE ANNUAL WELLNESS VISIT, SUBSEQUENT: Primary | ICD-10-CM

## 2025-02-26 DIAGNOSIS — E11.9 TYPE 2 DIABETES MELLITUS WITHOUT COMPLICATION, WITHOUT LONG-TERM CURRENT USE OF INSULIN (HCC): ICD-10-CM

## 2025-02-26 DIAGNOSIS — R91.1 PULMONARY NODULE, RIGHT: ICD-10-CM

## 2025-02-26 ASSESSMENT — PATIENT HEALTH QUESTIONNAIRE - PHQ9
1. LITTLE INTEREST OR PLEASURE IN DOING THINGS: NOT AT ALL
SUM OF ALL RESPONSES TO PHQ QUESTIONS 1-9: 0
SUM OF ALL RESPONSES TO PHQ QUESTIONS 1-9: 0
SUM OF ALL RESPONSES TO PHQ9 QUESTIONS 1 & 2: 0
SUM OF ALL RESPONSES TO PHQ QUESTIONS 1-9: 0
SUM OF ALL RESPONSES TO PHQ QUESTIONS 1-9: 0
2. FEELING DOWN, DEPRESSED OR HOPELESS: NOT AT ALL

## 2025-02-26 ASSESSMENT — ANXIETY QUESTIONNAIRES
6. BECOMING EASILY ANNOYED OR IRRITABLE: NOT AT ALL
1. FEELING NERVOUS, ANXIOUS, OR ON EDGE: NOT AT ALL
GAD7 TOTAL SCORE: 0
4. TROUBLE RELAXING: NOT AT ALL
IF YOU CHECKED OFF ANY PROBLEMS ON THIS QUESTIONNAIRE, HOW DIFFICULT HAVE THESE PROBLEMS MADE IT FOR YOU TO DO YOUR WORK, TAKE CARE OF THINGS AT HOME, OR GET ALONG WITH OTHER PEOPLE: NOT DIFFICULT AT ALL
5. BEING SO RESTLESS THAT IT IS HARD TO SIT STILL: NOT AT ALL
2. NOT BEING ABLE TO STOP OR CONTROL WORRYING: NOT AT ALL
7. FEELING AFRAID AS IF SOMETHING AWFUL MIGHT HAPPEN: NOT AT ALL
3. WORRYING TOO MUCH ABOUT DIFFERENT THINGS: NOT AT ALL

## 2025-02-26 NOTE — PROGRESS NOTES
Medicare Annual Wellness Visit    Gianfranco Nation Sr. is here for Medicare AWV    Assessment & Plan   Medicare annual wellness visit, subsequent     No follow-ups on file.     Subjective       Patient's complete Health Risk Assessment and screening values have been reviewed and are found in Flowsheets. The following problems were reviewed today and where indicated follow up appointments were made and/or referrals ordered.    Positive Risk Factor Screenings with Interventions:              Inactivity:  On average, how many days per week do you engage in moderate to strenuous exercise (like a brisk walk)?: 2 days (!) Abnormal  On average, how many minutes do you engage in exercise at this level?: 20 min  Interventions:  See A/P for plan and any pertinent orders     Abnormal BMI (obese):  Body mass index is 35.52 kg/m². (!) Abnormal  Interventions:  See A/P for plan and any pertinent orders          Vision Screen:  Do you have difficulty driving, watching TV, or doing any of your daily activities because of your eyesight?: No  Have you had an eye exam within the past year?: (!) No  Interventions:   See A/P for any pertinent orders      Advanced Directives:  Do you have a Living Will?: (!) No    Intervention:  has NO advanced directive - information provided                     Objective   Vitals:    02/26/25 0944 02/26/25 0953   BP: (!) 142/87 (!) 144/84   Site: Right Upper Arm Right Upper Arm   Position: Sitting Sitting   Cuff Size: Large Adult Large Adult   Pulse: 67    Resp: 18    Temp: 98.2 °F (36.8 °C)    TempSrc: Oral    SpO2: 97%    Weight: 122.1 kg (269 lb 3.2 oz)    Height: 1.854 m (6' 1\")       Body mass index is 35.52 kg/m².                    Allergies   Allergen Reactions    Metformin And Related Other (See Comments)     Not feeling good, constipation    Shellfish Allergy Rash     Prior to Visit Medications    Medication Sig Taking? Authorizing Provider   colchicine (COLCRYS) 0.6 MG tablet Take 1 tablet

## 2025-02-26 NOTE — PATIENT INSTRUCTIONS
Learning About Being Active as an Older Adult  Why is being active important as you get older?     Being active is one of the best things you can do for your health. And it's never too late to start. Being active--or getting active, if you aren't already--has definite benefits. It can:  Give you more energy,  Keep your mind sharp.  Improve balance to reduce your risk of falls.  Help you manage chronic illness with fewer medicines.  No matter how old you are, how fit you are, or what health problems you have, there is a form of activity that will work for you. And the more physical activity you can do, the better your overall health will be.  What kinds of activity can help you stay healthy?  Being more active will make your daily activities easier. Physical activity includes planned exercise and things you do in daily life. There are four types of activity:  Aerobic.  Doing aerobic activity makes your heart and lungs strong.  Includes walking, dancing, and gardening.  Aim for at least 2½ hours spread throughout the week.  It improves your energy and can help you sleep better.  Muscle-strengthening.  This type of activity can help maintain muscle and strengthen bones.  Includes climbing stairs, using resistance bands, and lifting or carrying heavy loads.  Aim for at least twice a week.  It can help protect the knees and other joints.  Stretching.  Stretching gives you better range of motion in joints and muscles.  Includes upper arm stretches, calf stretches, and gentle yoga.  Aim for at least twice a week, preferably after your muscles are warmed up from other activities.  It can help you function better in daily life.  Balancing.  This helps you stay coordinated and have good posture.  Includes heel-to-toe walking, arianna chi, and certain types of yoga.  Aim for at least 3 days a week.  It can reduce your risk of falling.  Even if you have a hard time meeting the recommendations, it's better to be more active

## 2025-02-26 NOTE — PROGRESS NOTES
Chief Complaint   Patient presents with    Medicare AWV     \"Have you been to the ER, urgent care clinic since your last visit?  Hospitalized since your last visit?\"    NO    “Have you seen or consulted any other health care providers outside our system since your last visit?”    NO      “Have you had a colorectal cancer screening such as a colonoscopy/FIT/Cologuard?    NO    Date of last Colonoscopy: 9/30/2020  No cologuard on file  No FIT/FOBT on file   No flexible sigmoidoscopy on file

## 2025-02-26 NOTE — PROGRESS NOTES
SPORTS MEDICINE AND PRIMARY CARE  Ethan Chavez MD, FACP, CMD  [address] [facility] [facility] [address]  Phone:  [phone]  Fax: [phone]       Chief Complaint   Patient presents with    Medicare AWV   .      SUBJECTIVE:  History of Present Illness         Gianfranco Nation Sr. is a 69 y.o. male patient presents for evaluation of eczema, hypertension, diabetes mellitus, pulmonary nodule, dyslipidemia, and health maintenance.    He was recommended to undergo light therapy by his dermatologist at [facility] due to the presence of irregular cells identified during a biopsy, which is basically for eczema. However, he has expressed reservations about this treatment. [facility] but he canceled it because he wanted to schedule a colonoscopy. He also consulted with another dermatologist, Dr. Dayana Duarte, who informed him that she could not provide the therapy due to cost constraints and advised [facility] to [facility]. He has decided to postpone the light therapy until further research is conducted. He has been discussing his condition with various individuals to gain a better understanding before proceeding with the treatment.    He has not been monitoring his blood pressure at home recently. His cardiologist prescribed an additional medication to manage his elevated blood pressure, which he takes in conjunction with his regular atenolol. He does not experience shortness of breath during physical activities. His job as a supervisor involves minimal walking, but he maintains a constant pace. He is considering starting a walking program. He also takes a low-dose aspirin daily, as recommended by his cardiologist.    [facility].    SOCIAL HISTORY  He works as a supervisor.    MEDICATIONS  atenolol, aspirin       Current Outpatient Medications   Medication Sig Dispense Refill    colchicine (COLCRYS) 0.6 MG tablet Take 1 tablet by mouth

## 2025-02-27 LAB
APPEARANCE UR: CLEAR
BACTERIA URNS QL MICRO: NEGATIVE /HPF
BILIRUB UR QL: NEGATIVE
COLOR UR: NORMAL
EPITH CASTS URNS QL MICRO: NORMAL /LPF
EST. AVERAGE GLUCOSE BLD GHB EST-MCNC: 169 MG/DL
GLUCOSE UR STRIP.AUTO-MCNC: NEGATIVE MG/DL
HBA1C MFR BLD: 7.5 % (ref 4–5.6)
HGB UR QL STRIP: NEGATIVE
HYALINE CASTS URNS QL MICRO: NORMAL /LPF (ref 0–5)
KETONES UR QL STRIP.AUTO: NEGATIVE MG/DL
LEUKOCYTE ESTERASE UR QL STRIP.AUTO: NEGATIVE
NITRITE UR QL STRIP.AUTO: NEGATIVE
PH UR STRIP: 7.5 (ref 5–8)
PROT UR STRIP-MCNC: NEGATIVE MG/DL
RBC #/AREA URNS HPF: NORMAL /HPF (ref 0–5)
SP GR UR REFRACTOMETRY: 1.01 (ref 1–1.03)
UROBILINOGEN UR QL STRIP.AUTO: 0.2 EU/DL (ref 0.2–1)
WBC URNS QL MICRO: NORMAL /HPF (ref 0–4)

## 2025-02-28 ENCOUNTER — TELEPHONE (OUTPATIENT)
Facility: CLINIC | Age: 70
End: 2025-02-28

## 2025-02-28 DIAGNOSIS — Z12.11 SCREEN FOR COLON CANCER: Primary | ICD-10-CM

## 2025-04-02 ENCOUNTER — HOSPITAL ENCOUNTER (OUTPATIENT)
Facility: HOSPITAL | Age: 70
Discharge: HOME | End: 2025-04-05
Attending: INTERNAL MEDICINE
Payer: MEDICARE

## 2025-04-02 DIAGNOSIS — R91.1 PULMONARY NODULE, RIGHT: ICD-10-CM

## 2025-04-02 PROCEDURE — 71250 CT THORAX DX C-: CPT

## 2025-04-06 ENCOUNTER — RESULTS FOLLOW-UP (OUTPATIENT)
Facility: CLINIC | Age: 70
End: 2025-04-06

## 2025-04-06 PROBLEM — K76.0 HEPATIC STEATOSIS: Status: ACTIVE | Noted: 2025-04-02

## 2025-04-06 PROBLEM — R91.1 PULMONARY NODULE, LEFT: Status: ACTIVE | Noted: 2025-04-02

## 2025-04-17 RX ORDER — AMLODIPINE BESYLATE 10 MG/1
10 TABLET ORAL DAILY
Qty: 90 TABLET | Refills: 3 | Status: SHIPPED | OUTPATIENT
Start: 2025-04-17

## 2025-04-29 LAB — DIABETIC RETINOPATHY: NEGATIVE

## 2025-06-03 RX ORDER — ATENOLOL 50 MG/1
50 TABLET ORAL NIGHTLY
Qty: 90 TABLET | Refills: 3 | Status: SHIPPED | OUTPATIENT
Start: 2025-06-03

## 2025-08-11 RX ORDER — FINASTERIDE 5 MG/1
5 TABLET, FILM COATED ORAL DAILY
Qty: 90 TABLET | Refills: 3 | Status: SHIPPED | OUTPATIENT
Start: 2025-08-11

## 2025-08-12 ENCOUNTER — OFFICE VISIT (OUTPATIENT)
Facility: CLINIC | Age: 70
End: 2025-08-12

## 2025-08-12 VITALS
DIASTOLIC BLOOD PRESSURE: 65 MMHG | BODY MASS INDEX: 34.64 KG/M2 | WEIGHT: 261.4 LBS | HEIGHT: 73 IN | SYSTOLIC BLOOD PRESSURE: 110 MMHG | HEART RATE: 61 BPM | TEMPERATURE: 97.7 F | OXYGEN SATURATION: 95 % | RESPIRATION RATE: 16 BRPM

## 2025-08-12 DIAGNOSIS — E78.00 HYPERCHOLESTEROLEMIA: ICD-10-CM

## 2025-08-12 DIAGNOSIS — G47.33 OBSTRUCTIVE SLEEP APNEA: ICD-10-CM

## 2025-08-12 DIAGNOSIS — R35.1 NOCTURIA: ICD-10-CM

## 2025-08-12 DIAGNOSIS — M48.02 CERVICAL STENOSIS OF SPINE: ICD-10-CM

## 2025-08-12 DIAGNOSIS — I10 PRIMARY HYPERTENSION: ICD-10-CM

## 2025-08-12 DIAGNOSIS — E11.9 TYPE 2 DIABETES MELLITUS WITHOUT COMPLICATION, WITHOUT LONG-TERM CURRENT USE OF INSULIN (HCC): Primary | ICD-10-CM

## 2025-08-12 DIAGNOSIS — K76.0 HEPATIC STEATOSIS: ICD-10-CM

## 2025-08-12 DIAGNOSIS — E11.9 TYPE 2 DIABETES MELLITUS WITHOUT COMPLICATION, WITHOUT LONG-TERM CURRENT USE OF INSULIN (HCC): ICD-10-CM

## 2025-08-12 DIAGNOSIS — C84.00 MYCOSIS FUNGOIDES, UNSPECIFIED BODY REGION (HCC): ICD-10-CM

## 2025-08-12 RX ORDER — LOSARTAN POTASSIUM 50 MG/1
50 TABLET ORAL DAILY
COMMUNITY

## 2025-08-13 LAB
ALBUMIN SERPL-MCNC: 4 G/DL (ref 3.5–5.2)
ALBUMIN/GLOB SERPL: 1.3 (ref 1.1–2.2)
ALP SERPL-CCNC: 77 U/L (ref 40–129)
ALT SERPL-CCNC: 42 U/L (ref 10–50)
ANION GAP SERPL CALC-SCNC: 12 MMOL/L (ref 2–14)
APPEARANCE UR: CLEAR
AST SERPL-CCNC: 31 U/L (ref 10–50)
BACTERIA URNS QL MICRO: NEGATIVE /HPF
BASOPHILS # BLD: 0.07 K/UL (ref 0–0.1)
BASOPHILS NFR BLD: 1.6 % (ref 0–1)
BILIRUB SERPL-MCNC: 0.8 MG/DL (ref 0–1.2)
BILIRUB UR QL: NEGATIVE
BUN SERPL-MCNC: 11 MG/DL (ref 8–23)
BUN/CREAT SERPL: 9 (ref 12–20)
CALCIUM SERPL-MCNC: 9.7 MG/DL (ref 8.8–10.2)
CHLORIDE SERPL-SCNC: 104 MMOL/L (ref 98–107)
CHOLEST SERPL-MCNC: 172 MG/DL (ref 0–200)
CO2 SERPL-SCNC: 24 MMOL/L (ref 20–29)
COLOR UR: ABNORMAL
CREAT SERPL-MCNC: 1.25 MG/DL (ref 0.7–1.2)
CREAT UR-MCNC: 93.4 MG/DL (ref 39–259)
DIFFERENTIAL METHOD BLD: ABNORMAL
EOSINOPHIL # BLD: 0.24 K/UL (ref 0–0.4)
EOSINOPHIL NFR BLD: 5.5 % (ref 0–7)
EPITH CASTS URNS QL MICRO: ABNORMAL /LPF
ERYTHROCYTE [DISTWIDTH] IN BLOOD BY AUTOMATED COUNT: 14.5 % (ref 11.5–14.5)
EST. AVERAGE GLUCOSE BLD GHB EST-MCNC: 154 MG/DL
GLOBULIN SER CALC-MCNC: 3.2 G/DL (ref 2–4)
GLUCOSE SERPL-MCNC: 110 MG/DL (ref 65–100)
GLUCOSE UR STRIP.AUTO-MCNC: >1000 MG/DL
HBA1C MFR BLD: 7 % (ref 4–5.6)
HCT VFR BLD AUTO: 47 % (ref 36.6–50.3)
HDLC SERPL-MCNC: 34 MG/DL (ref 40–60)
HDLC SERPL: 5 (ref 0–5)
HGB BLD-MCNC: 15.1 G/DL (ref 12.1–17)
HGB UR QL STRIP: NEGATIVE
IMM GRANULOCYTES # BLD AUTO: 0.01 K/UL (ref 0–0.04)
IMM GRANULOCYTES NFR BLD AUTO: 0.2 % (ref 0–0.5)
KETONES UR QL STRIP.AUTO: NEGATIVE MG/DL
LDLC SERPL CALC-MCNC: ABNORMAL MG/DL
LDLC SERPL DIRECT ASSAY-MCNC: 19 MG/DL (ref 0–100)
LEUKOCYTE ESTERASE UR QL STRIP.AUTO: NEGATIVE
LYMPHOCYTES # BLD: 1.91 K/UL (ref 0.8–3.5)
LYMPHOCYTES NFR BLD: 43.6 % (ref 12–49)
MCH RBC QN AUTO: 29 PG (ref 26–34)
MCHC RBC AUTO-ENTMCNC: 32.1 G/DL (ref 30–36.5)
MCV RBC AUTO: 90.2 FL (ref 80–99)
MICROALBUMIN UR-MCNC: <1.2 MG/DL
MICROALBUMIN/CREAT UR-RTO: NORMAL MG/G
MONOCYTES # BLD: 0.47 K/UL (ref 0–1)
MONOCYTES NFR BLD: 10.7 % (ref 5–13)
NEUTS SEG # BLD: 1.68 K/UL (ref 1.8–8)
NEUTS SEG NFR BLD: 38.4 % (ref 32–75)
NITRITE UR QL STRIP.AUTO: NEGATIVE
NRBC # BLD: 0 K/UL (ref 0–0.01)
NRBC BLD-RTO: 0 PER 100 WBC
PH UR STRIP: 7 (ref 5–8)
PLATELET # BLD AUTO: 166 K/UL (ref 150–400)
PMV BLD AUTO: 11.5 FL (ref 8.9–12.9)
POTASSIUM SERPL-SCNC: 4.2 MMOL/L (ref 3.5–5.1)
PROT SERPL-MCNC: 7.2 G/DL (ref 6.4–8.3)
PROT UR STRIP-MCNC: NEGATIVE MG/DL
PSA SERPL-MCNC: 0.2 NG/ML (ref 0–4.4)
RBC # BLD AUTO: 5.21 M/UL (ref 4.1–5.7)
RBC #/AREA URNS HPF: ABNORMAL /HPF (ref 0–5)
SODIUM SERPL-SCNC: 140 MMOL/L (ref 136–145)
SP GR UR REFRACTOMETRY: 1.03 (ref 1–1.03)
TRIGL SERPL-MCNC: 685 MG/DL (ref 0–150)
UROBILINOGEN UR QL STRIP.AUTO: 0.2 EU/DL (ref 0.2–1)
VLDLC SERPL CALC-MCNC: 137 MG/DL
WBC # BLD AUTO: 4.4 K/UL (ref 4.1–11.1)
WBC URNS QL MICRO: ABNORMAL /HPF (ref 0–4)

## 2025-08-14 ENCOUNTER — RESULTS FOLLOW-UP (OUTPATIENT)
Facility: CLINIC | Age: 70
End: 2025-08-14

## 2025-08-14 RX ORDER — ATORVASTATIN CALCIUM 10 MG/1
10 TABLET, FILM COATED ORAL DAILY
Qty: 90 TABLET | Refills: 3 | Status: SHIPPED | OUTPATIENT
Start: 2025-08-14

## (undated) DEVICE — SOLIDIFIER MEDC 1200ML -- CONVERT TO 356117

## (undated) DEVICE — Z DISCONTINUED PER MEDLINE LINE GAS SAMPLING O2/CO2 LNG AD 13 FT NSL W/ TBNG FILTERLINE

## (undated) DEVICE — CUFF ADULT 1 PC 1 VINYL DISP --

## (undated) DEVICE — Device: Brand: MEDEX

## (undated) DEVICE — SNARE ENDOSCP M L240CM W27MM SHTH DIA2.4MM CHN 2.8MM OVL

## (undated) DEVICE — Device

## (undated) DEVICE — NEONATAL-ADULT SPO2 SENSOR: Brand: NELLCOR

## (undated) DEVICE — KENDALL RADIOLUCENT FOAM MONITORING ELECTRODE RECTANGULAR SHAPE: Brand: KENDALL

## (undated) DEVICE — CONTAINER SPEC 20 ML LID NEUT BUFF FORMALIN 10 % POLYPR STS

## (undated) DEVICE — SET ADMIN 16ML TBNG L100IN 2 Y INJ SITE IV PIGGY BK DISP

## (undated) DEVICE — SOLIDIFIER FLD 2OZ 1500CC N DISINF IN BTL DISP SAFESORB

## (undated) DEVICE — STRAINER URIN CALC RNL MSH -- CONVERT TO ITEM 357634

## (undated) DEVICE — BASIN EMSIS 16OZ GRAPHITE PLAS KID SHP MOLD GRAD FOR ORAL

## (undated) DEVICE — TOWEL 4 PLY TISS 19X30 SUE WHT

## (undated) DEVICE — NON-REM POLYHESIVE PATIENT RETURN ELECTRODE: Brand: VALLEYLAB

## (undated) DEVICE — SYR 10ML LUER LOK 1/5ML GRAD --

## (undated) DEVICE — CATH IV AUTOGRD BC PNK 20GA 25 -- INSYTE

## (undated) DEVICE — TRAP,MUCUS SPECIMEN, 80CC: Brand: MEDLINE

## (undated) DEVICE — ELECTRODE,RADIOTRANSLUCENT,FOAM,5PK: Brand: MEDLINE

## (undated) DEVICE — TRAP SUC MUCOUS 70ML -- MEDICHOICE MEDLINE

## (undated) DEVICE — NEEDLE HYPO 18GA L1.5IN PNK S STL HUB POLYPR SHLD REG BVL

## (undated) DEVICE — SYR 3ML LL TIP 1/10ML GRAD --

## (undated) DEVICE — (D)SYR 10ML 1/5ML GRAD NSAF -- PKGING CHANGE USE ITEM 338027

## (undated) DEVICE — 1200 GUARD II KIT W/5MM TUBE W/O VAC TUBE: Brand: GUARDIAN

## (undated) DEVICE — BASIN EMESIS 500CC ROSE 250/CS 60/PLT: Brand: MEDEGEN MEDICAL PRODUCTS, LLC